# Patient Record
Sex: MALE | Race: WHITE | NOT HISPANIC OR LATINO | Employment: OTHER | URBAN - METROPOLITAN AREA
[De-identification: names, ages, dates, MRNs, and addresses within clinical notes are randomized per-mention and may not be internally consistent; named-entity substitution may affect disease eponyms.]

---

## 2020-06-23 ENCOUNTER — APPOINTMENT (OUTPATIENT)
Dept: RADIOLOGY | Facility: MEDICAL CENTER | Age: 60
End: 2020-06-23
Payer: COMMERCIAL

## 2020-06-23 VITALS — SYSTOLIC BLOOD PRESSURE: 146 MMHG | DIASTOLIC BLOOD PRESSURE: 99 MMHG | HEART RATE: 112 BPM

## 2020-06-23 DIAGNOSIS — S92.352A CLOSED DISPLACED FRACTURE OF FIFTH METATARSAL BONE OF LEFT FOOT, INITIAL ENCOUNTER: ICD-10-CM

## 2020-06-23 DIAGNOSIS — S92.352A CLOSED DISPLACED FRACTURE OF FIFTH METATARSAL BONE OF LEFT FOOT, INITIAL ENCOUNTER: Primary | ICD-10-CM

## 2020-06-23 PROCEDURE — 73630 X-RAY EXAM OF FOOT: CPT

## 2020-06-23 PROCEDURE — 99203 OFFICE O/P NEW LOW 30 MIN: CPT | Performed by: EMERGENCY MEDICINE

## 2020-07-07 ENCOUNTER — OFFICE VISIT (OUTPATIENT)
Dept: OBGYN CLINIC | Facility: MEDICAL CENTER | Age: 60
End: 2020-07-07
Payer: COMMERCIAL

## 2020-07-07 ENCOUNTER — APPOINTMENT (OUTPATIENT)
Dept: RADIOLOGY | Facility: MEDICAL CENTER | Age: 60
End: 2020-07-07
Payer: COMMERCIAL

## 2020-07-07 VITALS — WEIGHT: 181.6 LBS | BODY MASS INDEX: 26 KG/M2 | HEIGHT: 70 IN

## 2020-07-07 DIAGNOSIS — S92.352D CLOSED DISPLACED FRACTURE OF FIFTH METATARSAL BONE OF LEFT FOOT WITH ROUTINE HEALING, SUBSEQUENT ENCOUNTER: ICD-10-CM

## 2020-07-07 DIAGNOSIS — S92.352D CLOSED DISPLACED FRACTURE OF FIFTH METATARSAL BONE OF LEFT FOOT WITH ROUTINE HEALING, SUBSEQUENT ENCOUNTER: Primary | ICD-10-CM

## 2020-07-07 PROCEDURE — 73630 X-RAY EXAM OF FOOT: CPT

## 2020-07-07 PROCEDURE — 99213 OFFICE O/P EST LOW 20 MIN: CPT | Performed by: EMERGENCY MEDICINE

## 2020-07-07 NOTE — PROGRESS NOTES
Assessment/Plan:    Diagnoses and all orders for this visit:    Closed displaced fracture of fifth metatarsal bone of left foot with routine healing, subsequent encounter  -     XR foot 3+ vw left; Future    Patient improving stable Xrays obtained today, presents today in shoe with mild limp, hoping to return to work in 2 weeks possibly ( on ladders)  Discussed weaning from boot as tolerated  Return in about 4 weeks (around 8/4/2020) for Repeat Xray foot  Chief Complaint:     Chief Complaint   Patient presents with    Left Foot - Follow-up       Subjective:   Patient ID: Eyad Bassett is a 61 y o  male  Patient returns three weeks out from injury of the left foot fracture repeat x-rays obtained today he has been in a Cam boot  Review of Systems   Constitutional: Negative for chills and fever  HENT: Negative for sore throat and trouble swallowing  Eyes: Negative for pain and discharge  Respiratory: Negative for choking and shortness of breath  Cardiovascular: Negative for chest pain and palpitations  Gastrointestinal: Negative for abdominal pain and vomiting  Endocrine: Negative for cold intolerance and heat intolerance  Musculoskeletal: Positive for arthralgias, gait problem and joint swelling  Neurological: Negative for dizziness and weakness  Psychiatric/Behavioral: Negative for self-injury and suicidal ideas  The following portions of the patient's chart were reviewed and updated as appropriate: Allergy:  No Known Allergies      Past Medical History:   Diagnosis Date    [facility]        History reviewed  No pertinent surgical history      Social History     Socioeconomic History    Marital status: /Civil Union     Spouse name: Not on file    Number of children: Not on file    Years of education: Not on file    Highest education level: Not on file   Occupational History    Not on file   Social Needs    Financial resource strain: Not on file    Food insecurity:     Worry: Not on file     Inability: Not on file    Transportation needs:     Medical: Not on file     Non-medical: Not on file   Tobacco Use    Smoking status: Current Some Day Smoker     Types: Cigars    Smokeless tobacco: Never Used    Tobacco comment: Occasionally   Substance and Sexual Activity    Alcohol use: Yes    Drug use: Not Currently    Sexual activity: Not on file   Lifestyle    Physical activity:     Days per week: Not on file     Minutes per session: Not on file    Stress: Not on file   Relationships    Social connections:     Talks on phone: Not on file     Gets together: Not on file     Attends [employer] service: Not on file     Active member of club or organization: Not on file     Attends meetings of clubs or organizations: Not on file     Relationship status: Not on file    Intimate partner violence:     Fear of current or ex partner: Not on file     Emotionally abused: Not on file     Physically abused: Not on file     Forced sexual activity: Not on file   Other Topics Concern    Not on file   Social History Narrative    Not on file       Family History   Problem Relation Age of Onset    Diabetes Mother     Dementia Father     Prostate cancer Father     Hypertension Father     Hyperlipidemia Father        Medications:  No current outpatient medications on file  There is no problem list on file for this patient  Objective:  Ht 5' 9 5" (1 765 m)   Wt 82 4 kg (181 lb 9 6 oz)   BMI 26 43 kg/m²     Ortho Exam    Physical Exam   Constitutional: He is oriented to person, place, and time  He appears well-developed and well-nourished  HENT:   Head: Normocephalic and atraumatic  Eyes: Conjunctivae are normal    Neck: Neck supple  Pulmonary/Chest: Effort normal    Neurological: He is alert and oriented to person, place, and time  Skin: Skin is warm and dry  Psychiatric: He has a normal mood and affect   His behavior is normal  Vitals reviewed  Neurologic Exam     Mental Status   Oriented to person, place, and time  Procedures    I have personally reviewed pertinent films in PACS

## 2022-06-04 LAB
LEFT EYE DIABETIC RETINOPATHY: NORMAL
RIGHT EYE DIABETIC RETINOPATHY: NORMAL
SEVERITY (EYE EXAM): NORMAL

## 2022-10-05 ENCOUNTER — OFFICE VISIT (OUTPATIENT)
Dept: FAMILY MEDICINE CLINIC | Facility: CLINIC | Age: 62
End: 2022-10-05
Payer: COMMERCIAL

## 2022-10-05 VITALS
HEART RATE: 99 BPM | TEMPERATURE: 97.1 F | DIASTOLIC BLOOD PRESSURE: 82 MMHG | SYSTOLIC BLOOD PRESSURE: 132 MMHG | OXYGEN SATURATION: 98 % | BODY MASS INDEX: 25.28 KG/M2 | HEIGHT: 70 IN | WEIGHT: 176.6 LBS

## 2022-10-05 DIAGNOSIS — Z76.89 ESTABLISHING CARE WITH NEW DOCTOR, ENCOUNTER FOR: ICD-10-CM

## 2022-10-05 DIAGNOSIS — Z13.21 ENCOUNTER FOR VITAMIN DEFICIENCY SCREENING: ICD-10-CM

## 2022-10-05 DIAGNOSIS — Z12.5 SCREENING FOR PROSTATE CANCER: ICD-10-CM

## 2022-10-05 DIAGNOSIS — E11.9 TYPE 2 DIABETES MELLITUS WITHOUT COMPLICATION, WITHOUT LONG-TERM CURRENT USE OF INSULIN (HCC): ICD-10-CM

## 2022-10-05 DIAGNOSIS — Z12.11 SCREENING FOR COLORECTAL CANCER: ICD-10-CM

## 2022-10-05 DIAGNOSIS — Z12.12 SCREENING FOR COLORECTAL CANCER: ICD-10-CM

## 2022-10-05 DIAGNOSIS — M54.42 ACUTE LEFT-SIDED LOW BACK PAIN WITH LEFT-SIDED SCIATICA: Primary | ICD-10-CM

## 2022-10-05 PROCEDURE — 99203 OFFICE O/P NEW LOW 30 MIN: CPT | Performed by: NURSE PRACTITIONER

## 2022-10-05 RX ORDER — METHOCARBAMOL 500 MG/1
500 TABLET, FILM COATED ORAL 3 TIMES DAILY
Qty: 60 TABLET | Refills: 0 | Status: SHIPPED | OUTPATIENT
Start: 2022-10-05

## 2022-10-05 RX ORDER — IBUPROFEN 200 MG
800 TABLET ORAL EVERY 6 HOURS PRN
COMMUNITY

## 2022-10-05 RX ORDER — NAPROXEN 500 MG/1
500 TABLET ORAL 2 TIMES DAILY WITH MEALS
Qty: 30 TABLET | Refills: 0 | Status: SHIPPED | OUTPATIENT
Start: 2022-10-05 | End: 2022-10-19 | Stop reason: ALTCHOICE

## 2022-10-05 NOTE — ASSESSMENT & PLAN NOTE
Diagnosed in 2015, states he was initiated on Metformin, but stopped medication due to side effects  Began to diet and exercise and last check of HGBA1C back in 2016 was "under 7" as per patient  Has not had follow-up since then  Will obtain labs      No results found for: HGBA1C

## 2022-10-05 NOTE — PROGRESS NOTES
Name: Ann Marie Blanco      : 1960      MRN: [id number]  Encounter Provider: YESENIA Cartagena  Encounter Date: 10/5/2022   Encounter department: [place] [id number]  Acute left-sided low back pain with left-sided sciatica  Comments:  Advised alternating ice therapy, then heat, ROM exercises as discussed  To follow-up with chiropractor as scheduled, return for worsening/concerning symptoms  Orders:  -     naproxen (Naprosyn) 500 mg tablet; Take 1 tablet (500 mg total) by mouth 2 (two) times a day with meals  -     methocarbamol (ROBAXIN) 500 mg tablet; Take 1 tablet (500 mg total) by mouth 3 (three) times a day    2  Type 2 diabetes mellitus without complication, without long-term current use of insulin ([facility] )  Assessment & Plan:  Diagnosed in , states he was initiated on Metformin, but stopped medication due to side effects  Began to diet and exercise and last check of HGBA1C back in 2016 was "under 7" as per patient  Has not had follow-up since then  Will obtain labs  No results found for: HGBA1C    Orders:  -     Lipid panel; Future  -     CBC and Platelet; Future  -     Comprehensive metabolic panel; Future  -     Microalbumin / creatinine urine ratio; Future  -     TSH, 3rd generation with Free T4 reflex; Future  -     Hemoglobin A1C; Future    3  Encounter for vitamin deficiency screening    4  Screening for prostate cancer  -     PSA, Total Screen; Future    5  Screening for colorectal cancer  -     Ambulatory referral for Colonoscopy; Future    6  Establishing care with new doctor, encounter for    BMI Counseling: Body mass index is 25 34 kg/m²   The BMI is above normal  Nutrition recommendations include decreasing portion sizes, encouraging healthy choices of fruits and vegetables, consuming healthier snacks, limiting drinks that contain sugar, moderation in carbohydrate intake, increasing intake of lean protein, reducing intake of saturated and trans fat and reducing intake of cholesterol  Exercise recommendations include exercising 3-5 times per week and strength training exercises  No pharmacotherapy was ordered  Rationale for BMI follow-up plan is due to patient being overweight or obese  Depression Screening and Follow-up Plan: Patient was screened for depression during today's encounter  They screened negative with a PHQ-2 score of 0  Subjective      Patient presents to the office for establishment of care  States he has not seen primary provider in 5 years  As per patient, diagnosed with Type 2 diabetes back in 2015/2016  Patient states he was started on Metformin, but experienced side effects, so stopped taking medication  Patient states he began to diet and exercised and was able to achieve a HGB A1C of less than 7, but that was back in 2016  Notes he has not been followed since then  Is not in the gym like he used to be  Patient does not monitor blood sugars at home, denies symptoms related to elevated blood sugars  Patient arrives with c/o lower sided left-sided back pain that began a week ago  Patient notes pain started after working  Saw chiropractor 5 days ago, felt better, but then pain worsen yesterday after working  Was unable to sleep due to pain  Notes pain as a "constant pain" 8-9/10  Pain is worse with sitting or laying  Pain starts in his left lower back, radiates to hips, then goes down his left leg  Patient denies saddle paresthesia, denies incontinence of bladder or bowel  Denies inability to walk or inability to bear weight on legs  Patient has been taking ibuprofen at home without relief  Review of Systems   Constitutional: Negative for chills, fever and unexpected weight change  HENT: Negative for sore throat and trouble swallowing  Eyes: Negative for photophobia and visual disturbance  Respiratory: Negative for cough and shortness of breath      Cardiovascular: Negative for chest pain and palpitations  Gastrointestinal: Negative for abdominal pain, nausea and vomiting  Genitourinary: Negative for decreased urine volume, dysuria, flank pain, frequency, hematuria and urgency  Musculoskeletal: Positive for back pain (left lower back)  Negative for gait problem and myalgias  Skin: Negative for color change and rash  Neurological: Positive for numbness (experiences "tingling" to back of left leg)  Negative for dizziness, weakness, light-headedness and headaches  Psychiatric/Behavioral: Negative for confusion  Current Outpatient Medications on File Prior to Visit   Medication Sig    ibuprofen (MOTRIN) 200 mg tablet Take 800 mg by mouth every 6 (six) hours as needed for mild pain       Objective     /82 (BP Location: Left arm, Patient Position: Sitting)   Pulse 99   Temp (!) 97 1 °F (36 2 °C)   Ht 5' 10" (1 778 m)   Wt 80 1 kg (176 lb 9 6 oz)   SpO2 98%   BMI 25 34 kg/m²     Physical Exam  Vitals reviewed  Constitutional:       General: He is not in acute distress  Appearance: Normal appearance  He is not ill-appearing  HENT:      Head: Normocephalic and atraumatic  Right Ear: External ear normal       Left Ear: External ear normal       Nose: Nose normal       Mouth/Throat:      Mouth: Mucous membranes are moist       Pharynx: Oropharynx is clear  Eyes:      Conjunctiva/sclera: Conjunctivae normal       Pupils: Pupils are equal, round, and reactive to light  Cardiovascular:      Rate and Rhythm: Normal rate and regular rhythm  Pulses: Normal pulses  Heart sounds: Normal heart sounds  Pulmonary:      Effort: Pulmonary effort is normal       Breath sounds: Normal breath sounds  Abdominal:      General: Bowel sounds are normal       Palpations: Abdomen is soft  Tenderness: There is no abdominal tenderness  There is no right CVA tenderness or left CVA tenderness     Musculoskeletal:      Cervical back: Normal, normal range of motion and neck supple  No bony tenderness  Thoracic back: Normal  No bony tenderness  Lumbar back: No tenderness or bony tenderness  Normal range of motion  Negative right straight leg raise test and negative left straight leg raise test         Back:       Right lower leg: No edema  Left lower leg: No edema  Skin:     General: Skin is warm and dry  Capillary Refill: Capillary refill takes less than 2 seconds  Neurological:      General: No focal deficit present  Mental Status: He is alert and oriented to person, place, and time     Psychiatric:         Mood and Affect: Mood normal          Behavior: Behavior normal        YESENIA Chandler

## 2022-10-05 NOTE — PATIENT INSTRUCTIONS
Lower Back Exercises   AMBULATORY CARE:   Lower back exercises  help heal and strengthen your back muscles to prevent another injury  Ask your healthcare provider if you need to see a physical therapist for more advanced exercises  Seek care immediately if:   You have severe pain that prevents you from moving  Contact your healthcare provider if:   Your pain becomes worse  You have new pain  You have questions or concerns about your condition or care  Do lower back exercises safely:   Do the exercises on a mat or firm surface  (not on a bed) to support your spine and prevent low back pain  Move slowly and smoothly  Avoid fast or jerky motions  Breathe normally  Do not hold your breath  Stop if you feel pain  It is normal to feel some discomfort at first  Regular exercise will help decrease your discomfort over time  Lower back exercises: Your healthcare provider may recommend that you do back exercises 10 to 30 minutes each day  He may also recommend that you do exercises 1 to 3 times each day  Ask your healthcare provider which exercises are best for you and how often to do them  Ankle pumps:  Lie on your back  Move your foot up (with your toes pointing toward your head)  Then, move your foot down (with your toes pointing away from you)  Repeat this exercise 10 times on each side  Heel slides:  Lie on your back  Slowly bend one leg and then straighten it  Next, bend the other leg and then straighten it  Repeat 10 times on each side  Pelvic tilt:  Lie on your back with your knees bent and feet flat on the floor  Place your arms in a relaxed position beside your body  Tighten the muscles of your abdomen and flatten your back against the floor  Hold for 5 seconds  Repeat 5 times  Back stretch:  Lie on your back with your hands behind your head  Bend your knees and turn the lower half of your body to one side  Hold this position for 10 seconds   Repeat 3 times on each side  Straight leg raises:  Lie on your back with one leg straight  Bend the other knee  Tighten your abdomen and then slowly lift the straight leg up about 6 to 12 inches off the floor  Hold for 1 to 5 seconds  Lower your leg slowly  Repeat 10 times on each leg  Knee-to-chest:  Lie on your back with your knees bent and feet flat on the floor  Pull one of your knees toward your chest and hold it there for 5 seconds  Return your leg to the starting position  Lift the other knee toward your chest and hold for 5 seconds  Do this 5 times on each side  Cat and camel:  Place your hands and knees on the floor  Arch your back upward toward the ceiling and lower your head  Round out your spine as much as you can  Hold for 5 seconds  Lift your head upward and push your chest downward toward the floor  Hold for 5 seconds  Do 3 sets or as directed  Wall squats:  Stand with your back against a wall  Tighten the muscles of your abdomen  Slowly lower your body until your knees are bent at a 45 degree angle  Hold this position for 5 seconds  Slowly move back up to a standing position  Repeat 10 times  Curl up:  Lie on your back with your knees bent and feet flat on the floor  Place your hands, palms down, underneath the curve in your lower back  Next, with your elbows on the floor, lift your shoulders and chest 2 to 3 inches  Keep your head in line with your shoulders  Hold this position for 5 seconds  When you can do this exercise without pain for 10 to 15 seconds, you may add a rotation  While your shoulders and chest are lifted off the ground, turn slightly to the left and hold  Repeat on the other side  Bird dog:  Place your hands and knees on the floor  Keep your wrists directly below your shoulders and your knees directly below your hips  Pull your belly button in toward your spine  Do not flatten or arch your back  Tighten your abdominal muscles   Raise one arm straight out so that it is aligned with your head  Next, raise the leg opposite your arm  Hold this position for 15 seconds  Lower your arm and leg slowly and change sides  Do 5 sets  © Copyright [employer] 2022 Information is for End User's use only and may not be sold, redistributed or otherwise used for commercial purposes  All illustrations and images included in CareNotes® are the copyrighted property of A D [employer] M [employer]  or [facility] [employer]idra Boothe   The above information is an  only  It is not intended as medical advice for individual conditions or treatments  Talk to your doctor, nurse or pharmacist before following any medical regimen to see if it is safe and effective for you

## 2022-10-07 ENCOUNTER — TELEPHONE (OUTPATIENT)
Dept: FAMILY MEDICINE CLINIC | Facility: CLINIC | Age: 62
End: 2022-10-07

## 2022-10-07 DIAGNOSIS — M54.42 ACUTE LEFT-SIDED LOW BACK PAIN WITH LEFT-SIDED SCIATICA: Primary | ICD-10-CM

## 2022-10-07 RX ORDER — METHYLPREDNISOLONE 4 MG/1
TABLET ORAL
Qty: 21 EACH | Refills: 0 | Status: SHIPPED | OUTPATIENT
Start: 2022-10-07 | End: 2022-10-19 | Stop reason: ALTCHOICE

## 2022-10-07 NOTE — TELEPHONE ENCOUNTER
The pain is worse at night  The medication is hot working  He is up walking around all night, not sleeping  He took 2 pills this morning and they have not helped     Please advise

## 2022-10-07 NOTE — TELEPHONE ENCOUNTER
Stop Naproxen and to start Medrol Dose Pack, just sent it in to pharmacy  Please inform him Medrol Dose pack will not take effect immediately, so will still need to take Robaxin    Thank you

## 2022-10-10 ENCOUNTER — APPOINTMENT (OUTPATIENT)
Dept: LAB | Facility: HOSPITAL | Age: 62
End: 2022-10-10
Payer: COMMERCIAL

## 2022-10-10 DIAGNOSIS — E11.9 TYPE 2 DIABETES MELLITUS WITHOUT COMPLICATION, WITHOUT LONG-TERM CURRENT USE OF INSULIN (HCC): ICD-10-CM

## 2022-10-10 DIAGNOSIS — Z12.5 SCREENING FOR PROSTATE CANCER: ICD-10-CM

## 2022-10-10 LAB
ALBUMIN SERPL BCP-MCNC: 3.8 G/DL (ref 3.5–5)
ALP SERPL-CCNC: 128 U/L (ref 46–116)
ALT SERPL W P-5'-P-CCNC: 35 U/L (ref 12–78)
ANION GAP SERPL CALCULATED.3IONS-SCNC: 9 MMOL/L (ref 4–13)
AST SERPL W P-5'-P-CCNC: 21 U/L (ref 5–45)
BILIRUB SERPL-MCNC: 0.65 MG/DL (ref 0.2–1)
BUN SERPL-MCNC: 23 MG/DL (ref 5–25)
CALCIUM SERPL-MCNC: 9.6 MG/DL (ref 8.3–10.1)
CHLORIDE SERPL-SCNC: 99 MMOL/L (ref 96–108)
CHOLEST SERPL-MCNC: 235 MG/DL
CO2 SERPL-SCNC: 28 MMOL/L (ref 21–32)
CREAT SERPL-MCNC: 1.28 MG/DL (ref 0.6–1.3)
CREAT UR-MCNC: 97.4 MG/DL
ERYTHROCYTE [DISTWIDTH] IN BLOOD BY AUTOMATED COUNT: 12.7 % (ref 11.6–15.1)
EST. AVERAGE GLUCOSE BLD GHB EST-MCNC: 272 MG/DL
GFR SERPL CREATININE-BSD FRML MDRD: 59 ML/MIN/1.73SQ M
GLUCOSE P FAST SERPL-MCNC: 349 MG/DL (ref 65–99)
HBA1C MFR BLD: 11.1 %
HCT VFR BLD AUTO: 44.9 % (ref 36.5–49.3)
HDLC SERPL-MCNC: 91 MG/DL
HGB BLD-MCNC: 14.9 G/DL (ref 12–17)
LDLC SERPL CALC-MCNC: 131 MG/DL (ref 0–100)
MCH RBC QN AUTO: 30.3 PG (ref 26.8–34.3)
MCHC RBC AUTO-ENTMCNC: 33.2 G/DL (ref 31.4–37.4)
MCV RBC AUTO: 91 FL (ref 82–98)
MICROALBUMIN UR-MCNC: 65.8 MG/L (ref 0–20)
MICROALBUMIN/CREAT 24H UR: 68 MG/G CREATININE (ref 0–30)
NONHDLC SERPL-MCNC: 144 MG/DL
PLATELET # BLD AUTO: 226 THOUSANDS/UL (ref 149–390)
PMV BLD AUTO: 12.1 FL (ref 8.9–12.7)
POTASSIUM SERPL-SCNC: 4.4 MMOL/L (ref 3.5–5.3)
PROT SERPL-MCNC: 7.4 G/DL (ref 6.4–8.4)
PSA SERPL-MCNC: 2.1 NG/ML (ref 0–4)
RBC # BLD AUTO: 4.91 MILLION/UL (ref 3.88–5.62)
SODIUM SERPL-SCNC: 136 MMOL/L (ref 135–147)
TRIGL SERPL-MCNC: 65 MG/DL
TSH SERPL DL<=0.05 MIU/L-ACNC: 0.67 UIU/ML (ref 0.45–4.5)
WBC # BLD AUTO: 10.95 THOUSAND/UL (ref 4.31–10.16)

## 2022-10-10 PROCEDURE — 82570 ASSAY OF URINE CREATININE: CPT

## 2022-10-10 PROCEDURE — G0103 PSA SCREENING: HCPCS

## 2022-10-10 PROCEDURE — 80053 COMPREHEN METABOLIC PANEL: CPT

## 2022-10-10 PROCEDURE — 82043 UR ALBUMIN QUANTITATIVE: CPT

## 2022-10-10 PROCEDURE — 36415 COLL VENOUS BLD VENIPUNCTURE: CPT

## 2022-10-10 PROCEDURE — 85027 COMPLETE CBC AUTOMATED: CPT

## 2022-10-10 PROCEDURE — 80061 LIPID PANEL: CPT

## 2022-10-10 PROCEDURE — 83036 HEMOGLOBIN GLYCOSYLATED A1C: CPT

## 2022-10-10 PROCEDURE — 84443 ASSAY THYROID STIM HORMONE: CPT

## 2022-10-14 ENCOUNTER — TELEPHONE (OUTPATIENT)
Dept: FAMILY MEDICINE CLINIC | Facility: CLINIC | Age: 62
End: 2022-10-14

## 2022-10-14 NOTE — TELEPHONE ENCOUNTER
Jose Purdy called and said that the pain medication not working so he is just taking advil but a lot last night he took 3 advil at 10pm then at 4am took 2 did nothing then at 6am he took another 2 he doesn't sleep good its worse at night  He goes to chiro on fridays feels good til Tuesday then he back in pain again  Would like to know what he should do he can't live with pain and continue to take all that advil  Please advise  Thank you

## 2022-10-19 ENCOUNTER — OFFICE VISIT (OUTPATIENT)
Dept: FAMILY MEDICINE CLINIC | Facility: CLINIC | Age: 62
End: 2022-10-19
Payer: COMMERCIAL

## 2022-10-19 VITALS
DIASTOLIC BLOOD PRESSURE: 98 MMHG | BODY MASS INDEX: 25.17 KG/M2 | WEIGHT: 175.8 LBS | OXYGEN SATURATION: 98 % | SYSTOLIC BLOOD PRESSURE: 146 MMHG | TEMPERATURE: 97.1 F | HEIGHT: 70 IN | HEART RATE: 99 BPM

## 2022-10-19 DIAGNOSIS — Z00.00 ANNUAL PHYSICAL EXAM: ICD-10-CM

## 2022-10-19 DIAGNOSIS — E78.2 MIXED HYPERLIPIDEMIA: ICD-10-CM

## 2022-10-19 DIAGNOSIS — E11.9 TYPE 2 DIABETES MELLITUS WITHOUT COMPLICATION, WITHOUT LONG-TERM CURRENT USE OF INSULIN (HCC): Primary | ICD-10-CM

## 2022-10-19 PROCEDURE — 99396 PREV VISIT EST AGE 40-64: CPT | Performed by: NURSE PRACTITIONER

## 2022-10-19 RX ORDER — GLIMEPIRIDE 2 MG/1
2 TABLET ORAL
Qty: 90 TABLET | Refills: 0 | Status: SHIPPED | OUTPATIENT
Start: 2022-10-19 | End: 2023-04-17

## 2022-10-19 RX ORDER — BLOOD SUGAR DIAGNOSTIC
STRIP MISCELLANEOUS
Qty: 100 EACH | Refills: 3 | Status: SHIPPED | OUTPATIENT
Start: 2022-10-19

## 2022-10-19 RX ORDER — LANCETS 33 GAUGE
EACH MISCELLANEOUS
Qty: 100 EACH | Refills: 3 | Status: SHIPPED | OUTPATIENT
Start: 2022-10-19

## 2022-10-19 RX ORDER — BLOOD-GLUCOSE METER
KIT MISCELLANEOUS
Qty: 1 KIT | Refills: 0 | Status: SHIPPED | OUTPATIENT
Start: 2022-10-19

## 2022-10-19 RX ORDER — ATORVASTATIN CALCIUM 20 MG/1
20 TABLET, FILM COATED ORAL EVERY EVENING
Qty: 90 TABLET | Refills: 1 | Status: SHIPPED | OUTPATIENT
Start: 2022-10-19 | End: 2023-04-17

## 2022-10-19 NOTE — PROGRESS NOTES
Temo Munroe [id number] [facility]    NAME: Rich Arcos  AGE: 58 y o  SEX: male  : 1960     DATE: 10/19/2022     Assessment and Plan:     Problem List Items Addressed This Visit        Endocrine    Type 2 diabetes mellitus without complication, without long-term current use of insulin ([facility] [address] ) - Primary     Recent blood work reviewed with patient  Due to GI intolerance to metformin previously, will initiate glimepiride 2 mg  Discussed with patient adherence to lifestyle modifications including diabetic diet, increasing physical activity, maintaining healthy weight  At this time patient is refusing diabetic education  Foot exam completed, patient had eye exam back in ,  paperwork completed  Will re-evaluate in 3 months with repeat blood work  Lab Results   Component Value Date    HGBA1C 11 1 (H) 10/10/2022            Relevant Medications    glimepiride (AMARYL) 2 mg tablet    atorvastatin (LIPITOR) 20 mg tablet    Blood Glucose Monitoring Suppl (OneTouch Verio Reflect) w/Device KIT    glucose blood (OneTouch Verio) test strip    OneTouch Delica Lancets 86F MISC    Other Relevant Orders    Hemoglobin A1C    Comprehensive metabolic panel    CBC and Platelet       Other    Mixed hyperlipidemia     Recent blood work reviewed  Patient initiated on statin  Discussed lifestyle modifications including low-fat, high-fiber diet, weight management, and increasing physical activity  Relevant Medications    atorvastatin (LIPITOR) 20 mg tablet      Other Visit Diagnoses     Annual physical exam              Immunizations and preventive care screenings were discussed with patient today  Appropriate education was printed on patient's after visit summary  Discussed risks and benefits of prostate cancer screening   We discussed the controversial history of PSA screening for prostate cancer in the [place] as well as the risk of over detection and over treatment of prostate cancer by way of PSA screening  The patient understands that PSA blood testing is an imperfect way to screen for prostate cancer and that elevated PSA levels in the blood may also be caused by infection, inflammation, prostatic trauma or manipulation, urological procedures, or by benign prostatic enlargement  The role of the digital rectal examination in prostate cancer screening was also discussed and I discussed with him that there is large interobserver variability in the findings of digital rectal examination  Counseling:  Alcohol/drug use: discussed moderation in alcohol intake, the recommendations for healthy alcohol use, and avoidance of illicit drug use  Dental Health: discussed importance of regular tooth brushing, flossing, and dental visits  Injury prevention: discussed safety/seat belts, safety helmets, smoke detectors, carbon dioxide detectors, and smoking near bedding or upholstery  Sexual health: discussed sexually transmitted diseases, partner selection, use of condoms, avoidance of unintended pregnancy, and contraceptive alternatives  · Exercise: the importance of regular exercise/physical activity was discussed  Recommend exercise 3-5 times per week for at least 30 minutes  Tobacco Cessation Counseling: Tobacco cessation counseling was provided  The patient is sincerely urged to quit consumption of tobacco  He is not ready to quit tobacco  Medication options and side effects of medication discussed  Patient refused medication  Return in about 3 months (around 1/19/2023) for Needs 30 minute visit  Chief Complaint:     Chief Complaint   Patient presents with   • Physical Exam     Diabetic foot exam due  History of Present Illness:     Adult Annual Physical   Patient here for a comprehensive physical exam  The patient reports no problems  Diet and Physical Activity  · Diet/Nutrition: poor diet  · Exercise: no formal exercise  Depression Screening  PHQ-2/9 Depression Screening         General Health  · Sleep: gets 7-8 hours of sleep on average  · Hearing: normal - bilateral   · Vision: most recent eye exam <1 year ago and wears glasses  · Dental: regular dental visits and brushes teeth twice daily   Health  · Symptoms include: none     Review of Systems:     Review of Systems   Constitutional: Negative for activity change, appetite change, fatigue and unexpected weight change  HENT: Negative for ear pain, sore throat and trouble swallowing  Eyes: Negative for photophobia and visual disturbance  Respiratory: Negative for cough, chest tightness and shortness of breath  Cardiovascular: Negative for chest pain and palpitations  Gastrointestinal: Negative for abdominal pain, constipation, diarrhea, nausea and vomiting  Endocrine: Negative for polydipsia, polyphagia and polyuria  Genitourinary: Negative for decreased urine volume, dysuria, flank pain, frequency, hematuria and urgency  Musculoskeletal: Negative for arthralgias and myalgias  Skin: Negative for color change and rash  Neurological: Negative for dizziness, syncope, weakness, light-headedness, numbness and headaches  Psychiatric/Behavioral: Negative for confusion and dysphoric mood  The patient is not nervous/anxious  Past Medical History:     Past Medical History:   Diagnosis Date   • Diabetes mellitus ([facility] )    • Type 2 diabetes mellitus ([facility] )       Past Surgical History:     History reviewed  No pertinent surgical history     Family History:     Family History   Problem Relation Age of Onset   • Diabetes Mother    • Alzheimer's disease Mother    • Dementia Father    • Prostate cancer Father    • Hypertension Father    • Hyperlipidemia Father       Social History:     Social History     Socioeconomic History   • Marital status: /Civil Union     Spouse name: None   • Number of children: None   • Years of education: None   • Highest education level: None   Occupational History   • None   Tobacco Use   • Smoking status: Current Some Day Smoker     Years: 10 00     Types: Cigars   • Smokeless tobacco: Never Used   • Tobacco comment: Occasionally   Vaping Use   • Vaping Use: Never used   Substance and Sexual Activity   • Alcohol use: Yes   • Drug use: Not Currently   • Sexual activity: None   Other Topics Concern   • None   Social History Narrative   • None     Social Determinants of Health     Financial Resource Strain: Not on file   Food Insecurity: Not on file   Transportation Needs: Not on file   Physical Activity: Not on file   Stress: Not on file   Social Connections: Not on file   Intimate Partner Violence: Not on file   Housing Stability: Not on file      Current Medications:     Current Outpatient Medications   Medication Sig Dispense Refill   • atorvastatin (LIPITOR) 20 mg tablet Take 1 tablet (20 mg total) by mouth every evening 90 tablet 1   • Blood Glucose Monitoring Suppl (OneTouch Verio Reflect) w/Device KIT Check blood sugars once daily  Please substitute with appropriate alternative as covered by patient's insurance  Dx: E11 65 1 kit 0   • glimepiride (AMARYL) 2 mg tablet Take 1 tablet (2 mg total) by mouth daily with breakfast 90 tablet 0   • glucose blood (OneTouch Verio) test strip Check blood sugars once daily  Please substitute with appropriate alternative as covered by patient's insurance  Dx: E11 65 100 each 3   • ibuprofen (MOTRIN) 200 mg tablet Take 800 mg by mouth every 6 (six) hours as needed for mild pain     • methocarbamol (ROBAXIN) 500 mg tablet Take 1 tablet (500 mg total) by mouth 3 (three) times a day 60 tablet 0   • OneTouch Delica Lancets 09U MISC Check blood sugars once daily  Please substitute with appropriate alternative as covered by patient's insurance  Dx: E11 65 100 each 3     No current facility-administered medications for this visit        Allergies:     No Known Allergies   Physical Exam:     /98 (BP Location: Left arm, Patient Position: Sitting, Cuff Size: Standard)   Pulse 99   Temp (!) 97 1 °F (36 2 °C) (Tympanic)   Ht 5' 10" (1 778 m)   Wt 79 7 kg (175 lb 12 8 oz)   SpO2 98%   BMI 25 22 kg/m²     Physical Exam  Vitals reviewed  Constitutional:       General: He is not in acute distress  Appearance: Normal appearance  He is well-developed  He is not ill-appearing  HENT:      Head: Normocephalic and atraumatic  Right Ear: Tympanic membrane, ear canal and external ear normal       Left Ear: Tympanic membrane, ear canal and external ear normal       Nose: Nose normal       Mouth/Throat:      Mouth: Mucous membranes are moist       Pharynx: Oropharynx is clear  Eyes:      Conjunctiva/sclera: Conjunctivae normal       Pupils: Pupils are equal, round, and reactive to light  Neck:      Vascular: No carotid bruit  Cardiovascular:      Rate and Rhythm: Normal rate and regular rhythm  Pulses: Normal pulses  no weak pulses          Dorsalis pedis pulses are 2+ on the right side and 2+ on the left side  Heart sounds: Normal heart sounds  No murmur heard  Pulmonary:      Effort: Pulmonary effort is normal  No respiratory distress  Breath sounds: Normal breath sounds  Abdominal:      General: Bowel sounds are normal       Palpations: Abdomen is soft  Tenderness: There is no abdominal tenderness  There is no right CVA tenderness or left CVA tenderness  Musculoskeletal:         General: Normal range of motion  Cervical back: Normal range of motion and neck supple  Right lower leg: No edema  Left lower leg: No edema  Feet:      Right foot:      Skin integrity: No ulcer, skin breakdown, erythema, warmth, callus or dry skin  Left foot:      Skin integrity: No ulcer, skin breakdown, erythema, warmth, callus or dry skin  Skin:     General: Skin is warm and dry        Capillary Refill: Capillary refill takes less than 2 seconds  Neurological:      General: No focal deficit present  Mental Status: He is alert and oriented to person, place, and time  Psychiatric:         Mood and Affect: Mood normal          Behavior: Behavior normal         Patient's shoes and socks removed  Right Foot/Ankle   Right Foot Inspection  Skin Exam: skin normal and skin intact  No dry skin, no warmth, no callus, no erythema, no maceration, no abnormal color, no pre-ulcer, no ulcer and no callus  Toe Exam: ROM and strength within normal limits  Sensory   Monofilament testing: intact    Vascular  Capillary refills: < 3 seconds  The right DP pulse is 2+  Right Toe  - Comprehensive Exam  Ecchymosis: none  Arch: normal  Claw Toes: absent  Swelling: none   Tenderness: none         Left Foot/Ankle  Left Foot Inspection  Skin Exam: skin normal and skin intact  No dry skin, no warmth, no erythema, no maceration, normal color, no pre-ulcer, no ulcer and no callus  Toe Exam: ROM and strength within normal limits  Sensory   Monofilament testing: intact    Vascular  Capillary refills: < 3 seconds  The left DP pulse is 2+       Left Toe  - Comprehensive Exam  Ecchymosis: none  Arch: normal  Hammertoes: absent  Claw toes: absent  Swelling: none   Tenderness: none           Assign Risk Category  No deformity present  No loss of protective sensation  No weak pulses  Risk: 0      Elo L [place], [address]

## 2022-10-19 NOTE — PATIENT INSTRUCTIONS
Wellness Visit for Adults   AMBULATORY CARE:   A wellness visit  is when you see your healthcare provider to get screened for health problems  Your healthcare provider will also give you advice on how to stay healthy  Write down your questions so you remember to ask them  Ask your healthcare provider how often you should have a wellness visit  What happens at a wellness visit:  Your healthcare provider will ask about your health, and your family history of health problems  This includes high blood pressure, heart disease, and cancer  He or she will ask if you have symptoms that concern you, if you smoke, and about your mood  You may also be asked about your intake of medicines, supplements, food, and alcohol  Any of the following may be done: Your weight  will be checked  Your height may also be checked so your body mass index (BMI) can be calculated  Your BMI shows if you are at a healthy weight  Your blood pressure  and heart rate will be checked  Your temperature may also be checked  Blood and urine tests  may be done  Blood tests may be done to check your cholesterol levels  Abnormal cholesterol levels increase your risk for heart disease and stroke  You may also need a blood or urine test to check for diabetes if you are at increased risk  Urine tests may be done to look for signs of an infection or kidney disease  A physical exam  includes checking your heartbeat and lungs with a stethoscope  Your healthcare provider may also check your skin to look for sun damage  Screening tests  may be recommended  A screening test is done to check for diseases that may not cause symptoms  The screening tests you may need depend on your age, gender, family history, and lifestyle habits  For example, colorectal screening may be recommended if you are 48years old or older  Screening tests you need if you are a woman:   A Pap smear  is used to screen for cervical cancer   Pap smears are usually done every 3 to 5 years depending on your age  You may need them more often if you have had abnormal Pap smear test results in the past  Ask your healthcare provider how often you should have a Pap smear  A mammogram  is an x-ray of your breasts to screen for breast cancer  Experts recommend mammograms every 2 years starting at age 48 years  You may need a mammogram at age 52 years or younger if you have an increased risk for breast cancer  Talk to your healthcare provider about when you should start having mammograms and how often you need them  Vaccines you may need:   Get an influenza vaccine  every year  The influenza vaccine protects you from the flu  Several types of viruses cause the flu  The viruses change over time, so new vaccines are made each year  Get a tetanus-diphtheria (Td) booster vaccine  every 10 years  This vaccine protects you against tetanus and diphtheria  Tetanus is a severe infection that may cause painful muscle spasms and lockjaw  Diphtheria is a severe bacterial infection that causes a thick covering in the back of your mouth and throat  Get a human papillomavirus (HPV) vaccine  if you are female and aged 23 to 32 or male 23 to 24 and never received it  This vaccine protects you from HPV infection  HPV is the most common infection spread by sexual contact  HPV may also cause vaginal, penile, and anal cancers  Get a pneumococcal vaccine  if you are aged 72 years or older  The pneumococcal vaccine is an injection given to protect you from pneumococcal disease  Pneumococcal disease is an infection caused by pneumococcal bacteria  The infection may cause pneumonia, meningitis, or an ear infection  Get a shingles vaccine  if you are 60 or older, even if you have had shingles before  The shingles vaccine is an injection to protect you from the varicella-zoster virus  This is the same virus that causes chickenpox   Shingles is a painful rash that develops in people who had chickenpox or have been exposed to the virus  How to eat healthy:  My Plate is a model for planning healthy meals  It shows the types and amounts of foods that should go on your plate  Fruits and vegetables make up about half of your plate, and grains and protein make up the other half  A serving of dairy is included on the side of your plate  The amount of calories and serving sizes you need depends on your age, gender, weight, and height  Examples of healthy foods are listed below:  Eat a variety of vegetables  such as dark green, red, and orange vegetables  You can also include canned vegetables low in sodium (salt) and frozen vegetables without added butter or sauces  Eat a variety of fresh fruits , canned fruit in 100% juice, frozen fruit, and dried fruit  Include whole grains  At least half of the grains you eat should be whole grains  Examples include whole-wheat bread, wheat pasta, brown rice, and whole-grain cereals such as oatmeal     Eat a variety of protein foods such as seafood (fish and shellfish), lean meat, and poultry without skin (turkey and chicken)  Examples of lean meats include pork leg, shoulder, or tenderloin, and beef round, sirloin, tenderloin, and extra lean ground beef  Other protein foods include eggs and egg substitutes, beans, peas, soy products, nuts, and seeds  Choose low-fat dairy products such as skim or 1% milk or low-fat yogurt, cheese, and cottage cheese  Limit unhealthy fats  such as butter, hard margarine, and shortening  Exercise:  Exercise at least 30 minutes per day on most days of the week  Some examples of exercise include walking, biking, dancing, and swimming  You can also fit in more physical activity by taking the stairs instead of the elevator or parking farther away from stores  Include muscle strengthening activities 2 days each week  Regular exercise provides many health benefits   It helps you manage your weight, and decreases your risk for type 2 diabetes, heart disease, stroke, and high blood pressure  Exercise can also help improve your mood  Ask your healthcare provider about the best exercise plan for you  General health and safety guidelines:   Do not smoke  Nicotine and other chemicals in cigarettes and cigars can cause lung damage  Ask your healthcare provider for information if you currently smoke and need help to quit  E-cigarettes or smokeless tobacco still contain nicotine  Talk to your healthcare provider before you use these products  Limit alcohol  A drink of alcohol is 12 ounces of beer, 5 ounces of wine, or 1½ ounces of liquor  Lose weight, if needed  Being overweight increases your risk of certain health conditions  These include heart disease, high blood pressure, type 2 diabetes, and certain types of cancer  Protect your skin  Do not sunbathe or use tanning beds  Use sunscreen with a SPF 15 or higher  Apply sunscreen at least 15 minutes before you go outside  Reapply sunscreen every 2 hours  Wear protective clothing, hats, and sunglasses when you are outside  Drive safely  Always wear your seatbelt  Make sure everyone in your car wears a seatbelt  A seatbelt can save your life if you are in an accident  Do not use your cell phone when you are driving  This could distract you and cause an accident  Pull over if you need to make a call or send a text message  Practice safe sex  Use latex condoms if are sexually active and have more than one partner  Your healthcare provider may recommend screening tests for sexually transmitted infections (STIs)  Wear helmets, lifejackets, and protective gear  Always wear a helmet when you ride a bike or motorcycle, go skiing, or play sports that could cause a head injury  Wear protective equipment when you play sports  Wear a lifejacket when you are on a boat or doing water sports      © Copyright [employer] 2022 Information is for End User's use only and may not be sold, redistributed or otherwise used for commercial purposes  All illustrations and images included in CareNotes® are the copyrighted property of A D A M , Inc  or [employer]  The above information is an  only  It is not intended as medical advice for individual conditions or treatments  Talk to your doctor, nurse or pharmacist before following any medical regimen to see if it is safe and effective for you  Type 2 Diabetes Management for Adults   AMBULATORY CARE:   Type 2 diabetes  is a disease that affects how your body uses glucose (sugar)  Either your body cannot make enough insulin, or it cannot use the insulin correctly  It is important to keep diabetes controlled to prevent damage to your heart, blood vessels, and other organs  Have someone call your local emergency number (911 in the [address] [facility]) if:   You cannot be woken  You have signs of diabetic ketoacidosis:     confusion, fatigue    vomiting    rapid heartbeat    fruity smelling breath    extreme thirst    dry mouth and skin    You have any of the following signs of a heart attack:      Squeezing, pressure, or pain in your chest    You may  also have any of the following:     Discomfort or pain in your back, neck, jaw, stomach, or arm    Shortness of breath    Nausea or vomiting    Lightheadedness or a sudden cold sweat    You have any of the following signs of a stroke:      Numbness or drooping on one side of your face     Weakness in an arm or leg    Confusion or difficulty speaking    Dizziness, a severe headache, or vision loss    Call your doctor or diabetes care team if:   You have a sore or wound that will not heal     You have a change in the amount you urinate  Your blood sugar levels are higher than your target goals  You often have lower blood sugar levels than your target goals  Your skin is red, dry, warm, or swollen  You have trouble coping with diabetes, or you feel anxious or depressed      You have questions or concerns about your condition or care  What you need to know about high blood sugar levels:  High blood sugar levels may not cause any symptoms  You may feel more thirsty or urinate more often than usual  Over time, high blood sugar levels can damage your nerves, blood vessels, tissues, and organs  The following can increase your blood sugar levels:  Large meals or large amounts of carbohydrates at one time    Less physical activity    Stress    Illness    A lower dose of medicine or insulin, or a late dose    What you need to know about low blood sugar levels: You can prevent symptoms such as shakiness, dizziness, irritability, or confusion by preventing your blood sugar levels from going too low  Treat a low blood sugar level right away  Drink 4 ounces of juice or have 1 tube of glucose gel  Check your blood sugar level again 10 to 15 minutes later  When the level goes back to normal, eat a meal or snack to prevent another decrease  Keep glucose gel, raisins, or hard candy with you at all times to treat a low blood sugar level  Your blood sugar level can get too low if you take diabetes medicine or insulin and do not eat enough food  If you use insulin, check your blood sugar level before you exercise  If your blood sugar level is below 100 mg/dL, eat 4 crackers or 2 ounces of raisins, or drink 4 ounces of juice  Check your level every 30 minutes if you exercise more than 1 hour  You may need a snack during or after exercise  What you can do to manage your blood sugar levels:   Check your blood sugar levels as directed and as needed  Several items are available to use to check your levels  You may need to check by testing a drop of blood in a glucose monitor  You may instead be given a continuous glucose monitoring (CGM) device  The device is worn at all times  The CGM checks your blood sugar level every 5 minutes   It sends results to an electronic device such as a smart phone  A CGM can be used with or without an insulin pump  Talk with your provider to find out which method is best for you  The goal for blood sugar levels before meals  is between 80 and 130 mg/dL and 2 hours after eating  is lower than 180 mg/dL  Make healthy food choices  Work with a dietitian to develop a meal plan that works for you and your schedule  A dietitian can help you learn how to eat the right amount of carbohydrates during your meals and snacks  Carbohydrates can raise your blood sugar level if you eat too many at one time  Examples of foods that contain carbohydrates are breads, cereals, rice, pasta, and sweets  Get regular physical activity  Physical activity can help you get to your target blood sugar level goal and manage your weight  Get at least 150 minutes of moderate to vigorous aerobic physical activity each week  Do not miss more than 2 days in a row  Do not sit longer than 30 minutes at a time  Your healthcare provider can help you create an activity plan  The plan can include the best activities for you and can help you build your strength and endurance  Maintain a healthy weight  Ask your healthcare provider what a healthy weight is for you  Ask him or her to help you create a safe weight loss plan if you are overweight  Take your diabetes medicine or insulin as directed  You may need diabetes medicine, insulin, or both to help control your blood sugar levels  Your healthcare provider will teach you how and when to take your diabetes medicine or insulin  You will also be taught about side effects oral diabetes medicine can cause  Insulin may be injected, or given through a pump or pen  You and your care team will discuss which method is best for you  An insulin pump  is an implanted device that gives your insulin 24 hours a day  An insulin pump prevents the need for multiple insulin injections in a day           An insulin pen  is a device prefilled with the right amount of insulin  You and your family members will be taught how to draw up and give insulin  if this is the best method for you  Your education team will also teach you how to dispose of needles and syringes  You will learn how much insulin you need  and when to give it  You will be taught when not to give insulin  You will also be taught what to do if your blood sugar level drops too low  This may happen if you take insulin and do not eat the right amount of carbohydrates  Other things you can do to manage type 2 diabetes:   Wear medical alert identification  Wear medical alert jewelry or carry a card that says you have diabetes  Ask your provider where to get these items  Do not smoke  Nicotine and other chemicals in cigarettes and cigars can cause lung and blood vessel damage  It also makes it more difficult to manage your diabetes  Ask your provider for information if you currently smoke and need help to quit  Do not use e-cigarettes or smokeless tobacco in place of cigarettes or to help you quit  They still contain nicotine  Check your feet each day for cuts, scratches, calluses, or other wounds  Look for redness and swelling, and feel for warmth  Wear shoes that fit well  Check your shoes for rocks or other objects that can hurt your feet  Do not walk barefoot or wear shoes without socks  Wear cotton socks to help keep your feet dry  Ask about vaccines you may need  You have a higher risk for serious illness if you get the flu, pneumonia, COVID-19, or hepatitis  Ask your provider if you should get vaccines to prevent these or other diseases, and when to get the vaccines  Talk to your care team if you become stressed about diabetes care  Sometimes being able to fit diabetes care into your life can cause increased stress  The stress can cause you not to take care of yourself properly   Your care team can help by offering tips about self-care  Your care team may suggest you talk to a mental health provider  The provider can listen and offer help with self-care issues  Follow up with your doctor or diabetes care team as directed: You may need to have blood tests done before your follow-up visit  The test results will show if changes need to be made in your treatment or self-care  Write down your questions so you remember to ask them during your visits  Talk to your provider if you cannot afford your medicine  © Copyright [employer] 2022 Information is for End User's use only and may not be sold, redistributed or otherwise used for commercial purposes  All illustrations and images included in CareNotes® are the copyrighted property of A D A M , Inc  or [facility] Isidra Boothe   The above information is an  only  It is not intended as medical advice for individual conditions or treatments  Talk to your doctor, nurse or pharmacist before following any medical regimen to see if it is safe and effective for you

## 2022-10-19 NOTE — ASSESSMENT & PLAN NOTE
Recent blood work reviewed with patient  Due to GI intolerance to metformin previously, will initiate glimepiride 2 mg  Discussed with patient adherence to lifestyle modifications including diabetic diet, increasing physical activity, maintaining healthy weight  At this time patient is refusing diabetic education  Foot exam completed, patient had eye exam back in June, will paperwork completed  Will re-evaluate in 3 months with repeat blood work      Lab Results   Component Value Date    HGBA1C 11 1 (H) 10/10/2022

## 2022-10-20 ENCOUNTER — TELEPHONE (OUTPATIENT)
Dept: ADMINISTRATIVE | Facility: OTHER | Age: 62
End: 2022-10-20

## 2022-10-20 NOTE — TELEPHONE ENCOUNTER
Upon review of the In Basket request and the patient's chart, initial outreach has been made via fax, please see Contacts section for details       Thank you  Linda Conroy

## 2022-10-20 NOTE — TELEPHONE ENCOUNTER
----- Message from Fazal Trejo sent at 10/19/2022  4:01 PM EDT -----  Regarding: DM Eye Exam  10/19/22 4:01 PM    Mouna, our patient Cheryl Frost has had Diabetic Eye Exam completed/performed  Please assist in updating the patient chart by making an External outreach to [facility] facility located in [facility]  The date of service is 2-3 months ago  Patient couldn't remember the date of service  Their phone number is: [phone]      Thank you,  Fazal DUMONT [address] [facility]

## 2022-10-20 NOTE — LETTER
Diabetic Eye Exam Form    Date Requested: 10/20/22  Patient: Brittney Metzger  Patient : 1960   Referring Provider: YESENIA Lorenzo    DIABETIC Eye Exam Date _______________________________    Type of Exam MUST be documented for Diabetic Eye Exams  Please CHECK ONE  Retinal Exam       Dilated Retinal Exam       OCT       Optomap-Iris Exam      Fundus Photography     Left Eye - Please check Retinopathy AND Type or No Retinopathy      Exam did show retinopathy    Exam did not show retinopathy         Mild     Proliferative           Moderate    Severe            None         Right Eye - Please check Retinopathy AND Type or No Retinopathy     Exam did show retinopathy    Exam did not show retinopathy         Mild     Proliferative        Moderate    Severe        None       Comments __________________________________________________________    Practice Providing Exam ______________________________________________    Exam Performed By (print name) _______________________________________      Provider Signature ___________________________________________________    These reports are needed for  compliance  Please fax this completed form and a copy of the Diabetic Eye Exam report to our office located at [place] [address] as soon as possible via [phone] attention Lindsay: Phone [phone]  We thank you for your assistance in treating our mutual patient

## 2022-10-25 NOTE — TELEPHONE ENCOUNTER
Upon review of the In Basket request we were able to locate, review, and update the patient chart as requested for Diabetic Eye Exam     Any additional questions or concerns should be emailed to the Practice Liaisons via the appropriate education email address, please do not reply via In Basket      Thank you  Sai Grewal

## 2022-11-09 ENCOUNTER — OFFICE VISIT (OUTPATIENT)
Dept: FAMILY MEDICINE CLINIC | Facility: CLINIC | Age: 62
End: 2022-11-09

## 2022-11-09 ENCOUNTER — HOSPITAL ENCOUNTER (OUTPATIENT)
Dept: RADIOLOGY | Facility: HOSPITAL | Age: 62
Discharge: HOME | End: 2022-11-09

## 2022-11-09 ENCOUNTER — TELEPHONE (OUTPATIENT)
Dept: FAMILY MEDICINE CLINIC | Facility: CLINIC | Age: 62
End: 2022-11-09

## 2022-11-09 VITALS
HEART RATE: 108 BPM | DIASTOLIC BLOOD PRESSURE: 92 MMHG | HEIGHT: 70 IN | SYSTOLIC BLOOD PRESSURE: 168 MMHG | WEIGHT: 179.4 LBS | OXYGEN SATURATION: 98 % | BODY MASS INDEX: 25.68 KG/M2 | TEMPERATURE: 98.3 F

## 2022-11-09 DIAGNOSIS — M54.41 ACUTE BILATERAL LOW BACK PAIN WITH BILATERAL SCIATICA: Primary | ICD-10-CM

## 2022-11-09 DIAGNOSIS — M54.41 ACUTE BILATERAL LOW BACK PAIN WITH BILATERAL SCIATICA: ICD-10-CM

## 2022-11-09 DIAGNOSIS — M54.42 ACUTE BILATERAL LOW BACK PAIN WITH BILATERAL SCIATICA: Primary | ICD-10-CM

## 2022-11-09 DIAGNOSIS — M54.42 ACUTE BILATERAL LOW BACK PAIN WITH BILATERAL SCIATICA: ICD-10-CM

## 2022-11-09 RX ORDER — DICLOFENAC SODIUM 75 MG/1
75 TABLET, DELAYED RELEASE ORAL 2 TIMES DAILY
Qty: 60 TABLET | Refills: 0 | Status: SHIPPED | OUTPATIENT
Start: 2022-11-09

## 2022-11-09 RX ORDER — TIZANIDINE HYDROCHLORIDE 4 MG/1
4 CAPSULE, GELATIN COATED ORAL 3 TIMES DAILY PRN
Qty: 60 CAPSULE | Refills: 0 | Status: SHIPPED | OUTPATIENT
Start: 2022-11-09

## 2022-11-09 RX ORDER — LIDOCAINE 50 MG/G
1 PATCH TOPICAL DAILY
Qty: 30 PATCH | Refills: 0 | Status: SHIPPED | OUTPATIENT
Start: 2022-11-09

## 2022-11-09 NOTE — PROGRESS NOTES
Name: Charlene Sanchez      : 1960      MRN: [id number]  Encounter Provider: YESENIA Lindsay  Encounter Date: 2022   Encounter department: [place] [address]  Acute bilateral low back pain with bilateral sciatica  Comments:  Advised heat therapy, lower back stretches, medications as prescribed  Referred to comprehensive spine for PT  Orders:  -     XR spine lumbar minimum 4 views non injury; Future; Expected date: 2022  -     XR sacrum and coccyx; Future; Expected date: 2022  -     Ambulatory Referral to [facility] Program; Future  -     diclofenac (VOLTAREN) 75 mg EC tablet; Take 1 tablet (75 mg total) by mouth 2 (two) times a day  -     lidocaine (LIDODERM) 5 %; Apply 1 patch topically daily Remove & Discard patch within 12 hours or as directed by MD  -     TiZANidine (ZANAFLEX) 4 MG capsule; Take 1 capsule (4 mg total) by mouth 3 (three) times a day as needed for muscle spasms           Subjective      Patient presents the office for evaluation lower back pain  Patient seen in office last month (10/5) for same  Was treated with naproxen, Robaxin, Medrol Dosepak  Patient noted improvement, but over the last week back pain has returned  Stating last night was the worse, had difficulty sleeping  Patient is a contractor and continues to work  Patient states back pain started in the left lower back and was radiating to left lateral leg  Has now moved over to right lower back and is radiating down right lower leg  Patient notes pain as a constant ache  Pain is worse at night, rates pain at this time 6/10  Denies incontinence of bowel or bladder, saddle paresthesia, numbness or tingling in legs, gait difficulty, fever, chills  Patient continues to see chiropractor and obtaining an MRI  Review of Systems   Constitutional: Negative for chills and fever     HENT: Negative for congestion, sore throat and trouble swallowing  Eyes: Negative for photophobia and visual disturbance  Respiratory: Negative for cough and shortness of breath  Cardiovascular: Negative for chest pain, palpitations and leg swelling  Gastrointestinal: Negative for abdominal pain, constipation, nausea and vomiting  Genitourinary: Negative for decreased urine volume, dysuria, flank pain, frequency, hematuria, testicular pain and urgency  Musculoskeletal: Positive for back pain  Negative for arthralgias, gait problem, joint swelling and myalgias  Skin: Negative for color change and rash  Neurological: Negative for dizziness, weakness, light-headedness, numbness and headaches  Psychiatric/Behavioral: Negative for confusion  Current Outpatient Medications on File Prior to Visit   Medication Sig   • atorvastatin (LIPITOR) 20 mg tablet Take 1 tablet (20 mg total) by mouth every evening   • Blood Glucose Monitoring Suppl (OneTouch Verio Reflect) w/Device KIT Check blood sugars once daily  Please substitute with appropriate alternative as covered by patient's insurance  Dx: E11 65   • glimepiride (AMARYL) 2 mg tablet Take 1 tablet (2 mg total) by mouth daily with breakfast   • glucose blood (OneTouch Verio) test strip Check blood sugars once daily  Please substitute with appropriate alternative as covered by patient's insurance  Dx: E11 65   • ibuprofen (MOTRIN) 200 mg tablet Take 800 mg by mouth every 6 (six) hours as needed for mild pain   • OneTouch Delica Lancets 26L MISC Check blood sugars once daily  Please substitute with appropriate alternative as covered by patient's insurance   Dx: E11 65   • [DISCONTINUED] methocarbamol (ROBAXIN) 500 mg tablet Take 1 tablet (500 mg total) by mouth 3 (three) times a day (Patient not taking: Reported on 11/9/2022)       Objective     /92   Pulse (!) 108   Temp 98 3 °F (36 8 °C)   Ht 5' 10" (1 778 m)   Wt 81 4 kg (179 lb 6 4 oz)   SpO2 98%   BMI 25 74 kg/m²     Physical Exam  Vitals reviewed  Constitutional:       General: He is not in acute distress  Appearance: Normal appearance  He is not ill-appearing  HENT:      Head: Normocephalic and atraumatic  Right Ear: External ear normal       Left Ear: External ear normal       Nose: Nose normal       Mouth/Throat:      Mouth: Mucous membranes are moist       Pharynx: Oropharynx is clear  Eyes:      Conjunctiva/sclera: Conjunctivae normal       Pupils: Pupils are equal, round, and reactive to light  Cardiovascular:      Rate and Rhythm: Regular rhythm  Tachycardia present  Pulses: Normal pulses  Heart sounds: Normal heart sounds  No murmur heard  Pulmonary:      Effort: Pulmonary effort is normal       Breath sounds: Normal breath sounds  Abdominal:      General: Bowel sounds are normal       Palpations: Abdomen is soft  Tenderness: There is no abdominal tenderness  There is no right CVA tenderness or left CVA tenderness  Musculoskeletal:         General: Normal range of motion  Cervical back: Normal, full passive range of motion without pain, normal range of motion and neck supple  No tenderness or bony tenderness  Thoracic back: Normal  No bony tenderness  Lumbar back: Tenderness (to b/l lower paraspinal muscles) present  No bony tenderness  Negative right straight leg raise test and negative left straight leg raise test       Right lower leg: No edema  Left lower leg: No edema  Skin:     General: Skin is warm and dry  Capillary Refill: Capillary refill takes less than 2 seconds  Neurological:      General: No focal deficit present  Mental Status: He is alert and oriented to person, place, and time     Psychiatric:         Mood and Affect: Mood normal          Behavior: Behavior normal        YESENIA Lindsay

## 2022-11-09 NOTE — TELEPHONE ENCOUNTER
Anisa Dhillon from [facility] pharmacy called need prior Auth for lidocaine patch call [phone]

## 2022-11-09 NOTE — PATIENT INSTRUCTIONS
Lower Back Exercises   AMBULATORY CARE:   Lower back exercises  help heal and strengthen your back muscles to prevent another injury  Ask your healthcare provider if you need to see a physical therapist for more advanced exercises  Seek care immediately if:   You have severe pain that prevents you from moving  Contact your healthcare provider if:   Your pain becomes worse  You have new pain  You have questions or concerns about your condition or care  Do lower back exercises safely:   Do the exercises on a mat or firm surface  (not on a bed) to support your spine and prevent low back pain  Move slowly and smoothly  Avoid fast or jerky motions  Breathe normally  Do not hold your breath  Stop if you feel pain  It is normal to feel some discomfort at first  Regular exercise will help decrease your discomfort over time  Lower back exercises: Your healthcare provider may recommend that you do back exercises 10 to 30 minutes each day  He may also recommend that you do exercises 1 to 3 times each day  Ask your healthcare provider which exercises are best for you and how often to do them  Ankle pumps:  Lie on your back  Move your foot up (with your toes pointing toward your head)  Then, move your foot down (with your toes pointing away from you)  Repeat this exercise 10 times on each side  Heel slides:  Lie on your back  Slowly bend one leg and then straighten it  Next, bend the other leg and then straighten it  Repeat 10 times on each side  Pelvic tilt:  Lie on your back with your knees bent and feet flat on the floor  Place your arms in a relaxed position beside your body  Tighten the muscles of your abdomen and flatten your back against the floor  Hold for 5 seconds  Repeat 5 times  Back stretch:  Lie on your back with your hands behind your head  Bend your knees and turn the lower half of your body to one side  Hold this position for 10 seconds   Repeat 3 times on each side  Straight leg raises:  Lie on your back with one leg straight  Bend the other knee  Tighten your abdomen and then slowly lift the straight leg up about 6 to 12 inches off the floor  Hold for 1 to 5 seconds  Lower your leg slowly  Repeat 10 times on each leg  Knee-to-chest:  Lie on your back with your knees bent and feet flat on the floor  Pull one of your knees toward your chest and hold it there for 5 seconds  Return your leg to the starting position  Lift the other knee toward your chest and hold for 5 seconds  Do this 5 times on each side  Cat and camel:  Place your hands and knees on the floor  Arch your back upward toward the ceiling and lower your head  Round out your spine as much as you can  Hold for 5 seconds  Lift your head upward and push your chest downward toward the floor  Hold for 5 seconds  Do 3 sets or as directed  Wall squats:  Stand with your back against a wall  Tighten the muscles of your abdomen  Slowly lower your body until your knees are bent at a 45 degree angle  Hold this position for 5 seconds  Slowly move back up to a standing position  Repeat 10 times  Curl up:  Lie on your back with your knees bent and feet flat on the floor  Place your hands, palms down, underneath the curve in your lower back  Next, with your elbows on the floor, lift your shoulders and chest 2 to 3 inches  Keep your head in line with your shoulders  Hold this position for 5 seconds  When you can do this exercise without pain for 10 to 15 seconds, you may add a rotation  While your shoulders and chest are lifted off the ground, turn slightly to the left and hold  Repeat on the other side  Bird dog:  Place your hands and knees on the floor  Keep your wrists directly below your shoulders and your knees directly below your hips  Pull your belly button in toward your spine  Do not flatten or arch your back  Tighten your abdominal muscles   Raise one arm straight out so that it is aligned with your head  Next, raise the leg opposite your arm  Hold this position for 15 seconds  Lower your arm and leg slowly and change sides  Do 5 sets  © Copyright [employer] 2022 Information is for End User's use only and may not be sold, redistributed or otherwise used for commercial purposes  All illustrations and images included in CareNotes® are the copyrighted property of A D A M , Inc  or [facility] Isidra Boothe   The above information is an  only  It is not intended as medical advice for individual conditions or treatments  Talk to your doctor, nurse or pharmacist before following any medical regimen to see if it is safe and effective for you

## 2022-11-14 ENCOUNTER — APPOINTMENT (EMERGENCY)
Dept: CT IMAGING | Facility: HOSPITAL | Age: 62
End: 2022-11-14

## 2022-11-14 ENCOUNTER — HOSPITAL ENCOUNTER (EMERGENCY)
Facility: HOSPITAL | Age: 62
Discharge: HOME | End: 2022-11-14
Attending: EMERGENCY MEDICINE

## 2022-11-14 VITALS
TEMPERATURE: 97.1 F | HEART RATE: 104 BPM | OXYGEN SATURATION: 98 % | DIASTOLIC BLOOD PRESSURE: 89 MMHG | SYSTOLIC BLOOD PRESSURE: 181 MMHG | RESPIRATION RATE: 16 BRPM

## 2022-11-14 DIAGNOSIS — M54.50 ACUTE LOW BACK PAIN: Primary | ICD-10-CM

## 2022-11-14 RX ORDER — HYDROCODONE BITARTRATE AND ACETAMINOPHEN 5; 325 MG/1; MG/1
1 TABLET ORAL EVERY 6 HOURS PRN
Qty: 12 TABLET | Refills: 0 | Status: SHIPPED | OUTPATIENT
Start: 2022-11-14

## 2022-11-14 RX ORDER — HYDROMORPHONE HCL/PF 1 MG/ML
1 SYRINGE (ML) INJECTION ONCE
Status: COMPLETED | OUTPATIENT
Start: 2022-11-14 | End: 2022-11-14

## 2022-11-14 RX ADMIN — HYDROMORPHONE HYDROCHLORIDE 1 MG: 1 INJECTION, SOLUTION INTRAMUSCULAR; INTRAVENOUS; SUBCUTANEOUS at 12:29

## 2022-11-14 NOTE — ED PROVIDER NOTES
History  Chief Complaint   Patient presents with   • Back Pain     Patient c/o lower back pain that radiates down both legs  Patient states"he has a history of sciatica"  Patient had and xray on Tuesday  No fall or injury     HPI  57 yo M presents with low back pain radiating down both legs  He has had symptoms for the past month, improved and returned last week  Saw PCP and started on muscle relaxant, lidocaine patches and voltaren which patient reports are not helping him  He feels that his work exacerbates the pain  No bowel/bladder incontinence, saddle anesthesia, weakness  Does report tingling down both legs  Prior to Admission Medications   Prescriptions Last Dose Informant Patient Reported? Taking? Blood Glucose Monitoring Suppl (OneTouch Verio Reflect) w/Device KIT   No No   Sig: Check blood sugars once daily  Please substitute with appropriate alternative as covered by patient's insurance  Dx: I51 20   OneTouch Delica Lancets 77Q MISC   No No   Sig: Check blood sugars once daily  Please substitute with appropriate alternative as covered by patient's insurance  Dx: E11 65   TiZANidine (ZANAFLEX) 4 MG capsule   No No   Sig: Take 1 capsule (4 mg total) by mouth 3 (three) times a day as needed for muscle spasms   atorvastatin (LIPITOR) 20 mg tablet   No No   Sig: Take 1 tablet (20 mg total) by mouth every evening   diclofenac (VOLTAREN) 75 mg EC tablet   No No   Sig: Take 1 tablet (75 mg total) by mouth 2 (two) times a day   glimepiride (AMARYL) 2 mg tablet   No No   Sig: Take 1 tablet (2 mg total) by mouth daily with breakfast   glucose blood (OneTouch Verio) test strip   No No   Sig: Check blood sugars once daily  Please substitute with appropriate alternative as covered by patient's insurance   Dx: E11 65   ibuprofen (MOTRIN) 200 mg tablet   Yes No   Sig: Take 800 mg by mouth every 6 (six) hours as needed for mild pain   lidocaine (LIDODERM) 5 %   No No   Sig: Apply 1 patch topically daily Remove & Discard patch within 12 hours or as directed by MD      Facility-Administered Medications: None       Past Medical History:   Diagnosis Date   • Diabetes mellitus ([facility] )    • Hyperlipidemia    • Sciatica    • Type 2 diabetes mellitus ([facility] )        History reviewed  No pertinent surgical history  Family History   Problem Relation Age of Onset   • Diabetes Mother    • Alzheimer's disease Mother    • Dementia Father    • Prostate cancer Father    • Hypertension Father    • Hyperlipidemia Father      I have reviewed and agree with the history as documented  E-Cigarette/Vaping   • E-Cigarette Use Never User      E-Cigarette/Vaping Substances   • Nicotine No    • THC No    • CBD No    • Flavoring No    • Other No    • Unknown No      Social History     Tobacco Use   • Smoking status: Current Some Day Smoker     Years: 10 00     Types: Cigars   • Smokeless tobacco: Never Used   • Tobacco comment: Occasionally   Vaping Use   • Vaping Use: Never used   Substance Use Topics   • Alcohol use: Yes   • Drug use: Not Currently       Review of Systems   Constitutional: Negative for chills and fever  HENT: Negative for dental problem and ear pain  Eyes: Negative for pain and redness  Respiratory: Negative for cough and shortness of breath  Cardiovascular: Negative for chest pain and palpitations  Gastrointestinal: Negative for abdominal pain and nausea  Endocrine: Negative for polydipsia and polyphagia  Genitourinary: Negative for dysuria and frequency  Musculoskeletal: Positive for back pain  Negative for arthralgias and joint swelling  Skin: Negative for color change and rash  Neurological: Negative for dizziness and headaches  Psychiatric/Behavioral: Negative for behavioral problems and confusion  All other systems reviewed and are negative  Physical Exam  Physical Exam  Vitals and nursing note reviewed  Constitutional:       General: He is not in acute distress  Appearance: He is well-developed  He is not diaphoretic  HENT:      Head: Atraumatic  Right Ear: External ear normal       Left Ear: External ear normal       Nose: Nose normal    Eyes:      Conjunctiva/sclera: Conjunctivae normal       Pupils: Pupils are equal, round, and reactive to light  Neck:      Vascular: No JVD  Cardiovascular:      Rate and Rhythm: Normal rate and regular rhythm  Heart sounds: Normal heart sounds  No murmur heard  Pulmonary:      Effort: Pulmonary effort is normal  No respiratory distress  Breath sounds: Normal breath sounds  No wheezing  Abdominal:      General: Bowel sounds are normal  There is no distension  Palpations: Abdomen is soft  Tenderness: There is abdominal tenderness  Musculoskeletal:         General: Normal range of motion  Cervical back: Normal range of motion and neck supple  Skin:     General: Skin is warm and dry  Capillary Refill: Capillary refill takes less than 2 seconds  Neurological:      Mental Status: He is alert and oriented to person, place, and time  Cranial Nerves: No cranial nerve deficit     Psychiatric:         Behavior: Behavior normal          Vital Signs  ED Triage Vitals [11/14/22 1118]   Temperature Pulse Respirations Blood Pressure SpO2   (!) 97 1 °F (36 2 °C) 104 16 (!) 181/89 98 %      Temp Source Heart Rate Source Patient Position - Orthostatic VS BP Location FiO2 (%)   Tympanic Monitor Sitting Left arm --      Pain Score       --           Vitals:    11/14/22 1118   BP: (!) 181/89   Pulse: 104   Patient Position - Orthostatic VS: Sitting         Visual Acuity      ED Medications  Medications   HYDROmorphone (DILAUDID) injection 1 mg (1 mg Intramuscular Given 11/14/22 1229)       Diagnostic Studies  Results Reviewed     None                 CT spine lumbar without contrast   Final Result by Diana Baldwin MD (11/14 1323)      Left foraminal protrusion at L4-5 with moderate to severe left foraminal narrowing and abutment / mild impingement of the exiting left L4 nerve root  Facet joint disease at L4-5 and L5-S1   No acute compression collapse of the vertebra      Workstation performed: [id number]                    Procedures  Procedures         ED Course                               SBIRT 22yo+    Flowsheet Row Most Recent Value   SBIRT (23 yo +)    In order to provide better care to our patients, we are screening all of our patients for alcohol and drug use  Would it be okay to ask you these screening questions? Yes Filed at: 11/14/2022 1216   Initial Alcohol Screen: US AUDIT-C     1  How often do you have a drink containing alcohol? 0 Filed at: 11/14/2022 1216   2  How many drinks containing alcohol do you have on a typical day you are drinking? 0 Filed at: 11/14/2022 1216   3a  Male UNDER 65: How often do you have five or more drinks on one occasion? 0 Filed at: 11/14/2022 1216   3b  FEMALE Any Age, or MALE 65+: How often do you have 4 or more drinks on one occassion? 0 Filed at: 11/14/2022 1216   Audit-C Score 0 Filed at: 11/14/2022 1216   DARIAN: How many times in the past year have you    Used an illegal drug or used a prescription medication for non-medical reasons? Never Filed at: 11/14/2022 1216                    MDM  57 yo M presents with low back pain  No red flags in history or exam doubt cauda equina  CT shows foraminal protrusion  Discussed referral to comprehensive spine  Disposition  Final diagnoses:   Acute low back pain     Time reflects when diagnosis was documented in both MDM as applicable and the Disposition within this note     Time User Action Codes Description Comment    11/14/2022 12:51 PM Chuyita Ambrose Add [M54 50] Acute low back pain       ED Disposition     ED Disposition   Discharge    Condition   Stable    Date/Time   Mon Nov 14, 2022  1:29 PM    Comment   Aris Dempsey discharge to home/self care                 Follow-up Information     Follow up With Specialties Details Why Contact Info Additional Information    [facility] Physical Therapy Call   [phone]          Discharge Medication List as of 11/14/2022  1:31 PM      START taking these medications    Details   HYDROcodone-acetaminophen (NORCO) 5-325 mg per tablet Take 1 tablet by mouth every 6 (six) hours as needed for pain Max Daily Amount: 4 tablets, Starting Mon 11/14/2022, Normal         CONTINUE these medications which have NOT CHANGED    Details   atorvastatin (LIPITOR) 20 mg tablet Take 1 tablet (20 mg total) by mouth every evening, Starting Wed 10/19/2022, Until Mon 4/17/2023, Normal      Blood Glucose Monitoring Suppl (OneTouch Verio Reflect) w/Device KIT Check blood sugars once daily  Please substitute with appropriate alternative as covered by patient's insurance  Dx: E11 65, Normal      diclofenac (VOLTAREN) 75 mg EC tablet Take 1 tablet (75 mg total) by mouth 2 (two) times a day, Starting Wed 11/9/2022, Normal      glimepiride (AMARYL) 2 mg tablet Take 1 tablet (2 mg total) by mouth daily with breakfast, Starting Wed 10/19/2022, Until Mon 4/17/2023, Normal      glucose blood (OneTouch Verio) test strip Check blood sugars once daily  Please substitute with appropriate alternative as covered by patient's insurance  Dx: E11 65, Normal      ibuprofen (MOTRIN) 200 mg tablet Take 800 mg by mouth every 6 (six) hours as needed for mild pain, Historical Med      lidocaine (LIDODERM) 5 % Apply 1 patch topically daily Remove & Discard patch within 12 hours or as directed by MD, Starting Wed 11/9/2022, Normal      OneTouch Delica Lancets 47A MISC Check blood sugars once daily  Please substitute with appropriate alternative as covered by patient's insurance   Dx: E11 65, Normal      TiZANidine (ZANAFLEX) 4 MG capsule Take 1 capsule (4 mg total) by mouth 3 (three) times a day as needed for muscle spasms, Starting Wed 11/9/2022, Normal                 PDMP Review     None          ED Provider  Electronically Signed by           Nam Webster MD  11/14/22 7810

## 2022-11-15 ENCOUNTER — TELEPHONE (OUTPATIENT)
Dept: PHYSICAL THERAPY | Facility: OTHER | Age: 62
End: 2022-11-15

## 2022-11-15 NOTE — TELEPHONE ENCOUNTER
Nurse reached out to discuss recent referral entered for  Comprehensive Sp[facility] program     Patient referred to the program on 11/9/22 by his PCP  Referral entered into EMR for PT Spine and scheduled evaluation  Patient seen in ED yesterday where a second referral to  CSP was entered  This nurse called pt per protocol and discussed the above and moving forward with his plan of care  Patient confirmed he is scheduled to see SL PM 11/21/22 and PT on 12/2/22  Patient declined to participate in the program at this time and does not require  CSP services  Nurse wished him well and referral closed   Patient very appreciative of f/u call to address his care and new referral

## 2022-11-16 NOTE — H&P (VIEW-ONLY)
Assessment:  1  Lumbar radiculopathy    2  Lumbar facet arthropathy    3  Weakness of left foot        Plan:  Orders Placed This Encounter   Procedures   • FL spine and pain procedure     Standing Status:   Future     Standing Expiration Date:   11/21/2026     Order Specific Question:   Reason for Exam:     Answer:   left L4 and L5 TFESI depo     Order Specific Question:   Anticoagulant hold needed? Answer:   no       No orders of the defined types were placed in this encounter  My impressions and treatment recommendations were discussed in detail with the patient, who verbalized understanding and had no further questions  60-year-old male who presents to our office chief complaint of bilateral anterior numbness and burning pain as well as predominantly left lower extremity radicular symptoms  Pain travels past the knee into the calf into the dorsum of the ankle  Has notable left foot dorsiflexion left toe dorsiflexion weakness  CT of the spine showing left L4 nerve compression  Clinically symptomatic from lumbar radiculopathy  Discussed left L4 and L5 transforaminal epidural steroid injection under fluoroscopic guidance  He will also start physical therapy  Will re-evaluate patient  If strength is not improved, would need to get an MRI and possibly send him to see Orthopedic surgery  Regards pharmacological treatment options, recommended starting gabapentin which he declines as he does not want to be on pills  He did request refill of hydrocodone however which I declined  If he does wish to continue with medications, would recommend gabapentin instead  [address] [facility] Prescription Drug Monitoring Program report was reviewed and was appropriate     Complete risks and benefits including bleeding, infection, tissue reaction, nerve injury and allergic reaction were discussed  The approach was demonstrated using models and literature was provided   Verbal and written consent was obtained  Discharge instructions were provided  I personally saw and examined the patient and I agree with the above discussed plan of care  History of Present Illness:    Luisa Suh is a 58 y o  male who presents to [facility] for initial evaluation of the above stated pain complaints  The patient has a past medical and chronic pain history as outlined in the assessment section  He was referred by Referral Self  No address on file   Chief complaint of back and bilateral leg pain  This is a rather acute issue over last month and a half  Recently seen in the ED on 11/14/2022 for worsening of this issue  Initially saw his PCP  Was given Robaxin, Medrol Dosepak, naproxen with limited improvement  In ED was given hydrocodone which helped  Undetermined cause  Moderate intensity  8/10  Nearly constant  Worse in the evening and night  Pain is burning, numbness, pins and needles, dull/aching  Reports weakness in lower extremities  No assistive device  Reports numbness in anterior thighs , but on the left side he has numbness And tightness down the whole leg with hypersensitivity and numbness in the top of the foot  Left calf feels numb  Increased with lying down  No change with bending, sitting, exercising, relaxation, coughing, sneezing  Decreased with standing and walking  CT scan done in the ED shows multilevel disease  Left foraminal disc protrusion at L4-5 with notable left foraminal narrowing  Has history of type 2 diabetes, uncontrolled with A1c at [age]    Reports moderate relief with heat/ice therapy or chiropractic manipulation  No tobacco or marijuana use  Drinks alcohol  Not on blood thinners  Not allergic to latex or contrast dye  In the past ibuprofen has provided relief  Review of Systems:    Review of Systems   Constitutional: Negative for fever and unexpected weight change  HENT: Negative for trouble swallowing      Eyes: Negative for visual disturbance  Respiratory: Negative for shortness of breath and wheezing  Cardiovascular: Negative for chest pain and palpitations  Gastrointestinal: Negative for constipation, diarrhea, nausea and vomiting  Endocrine: Negative for cold intolerance, heat intolerance and polydipsia  Genitourinary: Negative for difficulty urinating and frequency  Musculoskeletal: Negative for arthralgias, gait problem, joint swelling and myalgias  Pain in lower back radiating into both legs with numbness in the left calf with pins and needle with tender to the touch numbness in toes    Skin: Negative for rash  Neurological: Negative for dizziness, seizures, syncope, weakness and headaches  Hematological: Does not bruise/bleed easily  Psychiatric/Behavioral: Negative for dysphoric mood  All other systems reviewed and are negative  Past Medical History:   Diagnosis Date   • Diabetes mellitus ([facility] )    • Hyperlipidemia    • Sciatica    • Type 2 diabetes mellitus ([facility] )        History reviewed  No pertinent surgical history  Family History   Problem Relation Age of Onset   • Diabetes Mother    • Alzheimer's disease Mother    • Dementia Father    • Prostate cancer Father    • Hypertension Father    • Hyperlipidemia Father        Social History     Occupational History   • Not on file   Tobacco Use   • Smoking status: Some Days     Types: Cigars   • Smokeless tobacco: Never   • Tobacco comments:     Occasionally   Vaping Use   • Vaping Use: Never used   Substance and Sexual Activity   • Alcohol use: Yes   • Drug use: Not Currently   • Sexual activity: Not on file         Current Outpatient Medications:   •  atorvastatin (LIPITOR) 20 mg tablet, Take 1 tablet (20 mg total) by mouth every evening, Disp: 90 tablet, Rfl: 1  •  Blood Glucose Monitoring Suppl (OneTouch Verio Reflect) w/Device KIT, Check blood sugars once daily   Please substitute with appropriate alternative as covered by patient's insurance  Dx: E11 65, Disp: 1 kit, Rfl: 0  •  diclofenac (VOLTAREN) 75 mg EC tablet, Take 1 tablet (75 mg total) by mouth 2 (two) times a day, Disp: 60 tablet, Rfl: 0  •  glimepiride (AMARYL) 2 mg tablet, Take 1 tablet (2 mg total) by mouth daily with breakfast, Disp: 90 tablet, Rfl: 0  •  glucose blood (OneTouch Verio) test strip, Check blood sugars once daily  Please substitute with appropriate alternative as covered by patient's insurance  Dx: E11 65, Disp: 100 each, Rfl: 3  •  HYDROcodone-acetaminophen (NORCO) 5-325 mg per tablet, Take 1 tablet by mouth every 6 (six) hours as needed for pain Max Daily Amount: 4 tablets, Disp: 12 tablet, Rfl: 0  •  ibuprofen (MOTRIN) 200 mg tablet, Take 800 mg by mouth every 6 (six) hours as needed for mild pain, Disp: , Rfl:   •  lidocaine (LIDODERM) 5 %, Apply 1 patch topically daily Remove & Discard patch within 12 hours or as directed by MD, Disp: 30 patch, Rfl: 0  •  OneTouch Delica Lancets 85N MISC, Check blood sugars once daily  Please substitute with appropriate alternative as covered by patient's insurance  Dx: E11 65, Disp: 100 each, Rfl: 3  •  TiZANidine (ZANAFLEX) 4 MG capsule, Take 1 capsule (4 mg total) by mouth 3 (three) times a day as needed for muscle spasms, Disp: 60 capsule, Rfl: 0    No Known Allergies    Physical Exam:    BP (!) 172/102 (BP Location: Left arm, Patient Position: Sitting, Cuff Size: Standard)   Pulse 93   Wt 80 7 kg (178 lb)   BMI 25 54 kg/m²     Constitutional: normal, well developed, well nourished, alert, in no distress and non-toxic and no overt pain behavior    Eyes: anicteric  HEENT: grossly intact  Neck: supple, symmetric, trachea midline and no masses   Pulmonary:even and unlabored  Cardiovascular:No edema or pitting edema present  Skin:Normal without rashes or lesions and well hydrated  Psychiatric:Mood and affect appropriate  Neurologic:Cranial Nerves II-XII grossly intact  Musculoskeletal:normal     Lumbar Spine Exam    Appearance:  Normal lordosis  Palpation/Tenderness:  no tenderness or spasm  Sensory:  no sensory deficits noted  Motor Strength:  Left hip flexion:  5/5  Left hip extension:  5/5  Right hip flexion:  5/5  Right hip extension:  5/5  Left knee flexion:  5/5  Left knee extension:  5/5  Right knee flexion:  5/5  Right knee extension:  5/5  Left foot dorsiflexion:  4/5  Left foot plantar flexion:  5/5  Right foot dorsiflexion:  5/5  Right foot plantar flexion:  5/5   Reflexes:  Left Patellar:  2+   Right Patellar:  2+   Left Achilles:  2+   Right Achilles:  2+   Special Tests:  Left Straight Leg Test:  negative  Right Straight Leg Test:  negative      Imaging  CT LUMBAR SPINE  11/14/22     INDICATION:   low back pain      COMPARISON: None      TECHNIQUE:  Contiguous axial images through the lumbar spine were obtained  Sagittal and coronal reconstructions were performed        IV Contrast:  None     Radiation dose length product (DLP) for this visit:  562 mGy-cm   This examination, like all CT scans performed in the [place], was performed utilizing techniques to minimize radiation dose exposure, including the use of iterative   reconstruction and automated exposure control        IMAGE QUALITY:  Diagnostic      FINDINGS:     ALIGNMENT:  There are 5 lumbar type vertebral bodies  Normal alignment of the lumbar spine  No spondylolysis or spondylolisthesis      VERTEBRAL BODIES:  No fracture  No lytic or blastic lesion      DEGENERATIVE CHANGES:     Lower Thoracic spine:  Normal lower thoracic disc spaces      L1-2:  Normal disc height  No herniation  Normal facet joints    No canal or foraminal stenosis      L2-3:  Appears unremarkable with no significant central canal narrowing of foraminal narrowing     L3-4:  Mild facet joint disease seen with disc bulge with no significant central canal narrowing of foraminal narrowing     L4-5:  Demonstrates left foraminal protrusion with moderate to severe left foraminal narrowing  This abuts the exiting left L4 nerve root  There is no significant central canal narrowing  There is facet joint disease     L5-S1:  Disc bulge seen with facet joint disease with no significant central canal narrowing or foraminal narrowing     PARASPINAL SOFT TISSUES:   Normal   Nonobstructing calculus seen right kidney with single 2 mm  IMPRESSION:     Left foraminal protrusion at L4-5 with moderate to severe left foraminal narrowing and abutment / mild impingement of the exiting left L4 nerve root    Facet joint disease at L4-5 and L5-S1  No acute compression collapse of the vertebra     FL spine and pain procedure    (Results Pending)       Orders Placed This Encounter   Procedures   • FL spine and pain procedure

## 2022-11-16 NOTE — PROGRESS NOTES
Assessment:  1  Lumbar radiculopathy    2  Lumbar facet arthropathy    3  Weakness of left foot        Plan:  Orders Placed This Encounter   Procedures   • FL spine and pain procedure     Standing Status:   Future     Standing Expiration Date:   11/21/2026     Order Specific Question:   Reason for Exam:     Answer:   left L4 and L5 TFESI depo     Order Specific Question:   Anticoagulant hold needed? Answer:   no       No orders of the defined types were placed in this encounter  My impressions and treatment recommendations were discussed in detail with the patient, who verbalized understanding and had no further questions  51-year-old male who presents to our office chief complaint of bilateral anterior numbness and burning pain as well as predominantly left lower extremity radicular symptoms  Pain travels past the knee into the calf into the dorsum of the ankle  Has notable left foot dorsiflexion left toe dorsiflexion weakness  CT of the spine showing left L4 nerve compression  Clinically symptomatic from lumbar radiculopathy  Discussed left L4 and L5 transforaminal epidural steroid injection under fluoroscopic guidance  He will also start physical therapy  Will re-evaluate patient  If strength is not improved, would need to get an MRI and possibly send him to see Orthopedic surgery  Regards pharmacological treatment options, recommended starting gabapentin which he declines as he does not want to be on pills  He did request refill of hydrocodone however which I declined  If he does wish to continue with medications, would recommend gabapentin instead  [place] Prescription Drug Monitoring Program report was reviewed and was appropriate     Complete risks and benefits including bleeding, infection, tissue reaction, nerve injury and allergic reaction were discussed  The approach was demonstrated using models and literature was provided   Verbal and written consent was obtained  Discharge instructions were provided  I personally saw and examined the patient and I agree with the above discussed plan of care  History of Present Illness:    Shant Wayne is a 58 y o  male who presents to [facility] for initial evaluation of the above stated pain complaints  The patient has a past medical and chronic pain history as outlined in the assessment section  He was referred by Referral Self  No address on file   Chief complaint of back and bilateral leg pain  This is a rather acute issue over last month and a half  Recently seen in the ED on 11/14/2022 for worsening of this issue  Initially saw his PCP  Was given Robaxin, Medrol Dosepak, naproxen with limited improvement  In ED was given hydrocodone which helped  Undetermined cause  Moderate intensity  8/10  Nearly constant  Worse in the evening and night  Pain is burning, numbness, pins and needles, dull/aching  Reports weakness in lower extremities  No assistive device  Reports numbness in anterior thighs , but on the left side he has numbness And tightness down the whole leg with hypersensitivity and numbness in the top of the foot  Left calf feels numb  Increased with lying down  No change with bending, sitting, exercising, relaxation, coughing, sneezing  Decreased with standing and walking  CT scan done in the ED shows multilevel disease  Left foraminal disc protrusion at L4-5 with notable left foraminal narrowing  Has history of type 2 diabetes, uncontrolled with A1c at [age]    Reports moderate relief with heat/ice therapy or chiropractic manipulation  No tobacco or marijuana use  Drinks alcohol  Not on blood thinners  Not allergic to latex or contrast dye  In the past ibuprofen has provided relief  Review of Systems:    Review of Systems   Constitutional: Negative for fever and unexpected weight change  HENT: Negative for trouble swallowing      Eyes: Negative for visual disturbance  Respiratory: Negative for shortness of breath and wheezing  Cardiovascular: Negative for chest pain and palpitations  Gastrointestinal: Negative for constipation, diarrhea, nausea and vomiting  Endocrine: Negative for cold intolerance, heat intolerance and polydipsia  Genitourinary: Negative for difficulty urinating and frequency  Musculoskeletal: Negative for arthralgias, gait problem, joint swelling and myalgias  Pain in lower back radiating into both legs with numbness in the left calf with pins and needle with tender to the touch numbness in toes    Skin: Negative for rash  Neurological: Negative for dizziness, seizures, syncope, weakness and headaches  Hematological: Does not bruise/bleed easily  Psychiatric/Behavioral: Negative for dysphoric mood  All other systems reviewed and are negative  Past Medical History:   Diagnosis Date   • Diabetes mellitus ([facility] )    • Hyperlipidemia    • Sciatica    • Type 2 diabetes mellitus ([facility] )        History reviewed  No pertinent surgical history  Family History   Problem Relation Age of Onset   • Diabetes Mother    • Alzheimer's disease Mother    • Dementia Father    • Prostate cancer Father    • Hypertension Father    • Hyperlipidemia Father        Social History     Occupational History   • Not on file   Tobacco Use   • Smoking status: Some Days     Types: Cigars   • Smokeless tobacco: Never   • Tobacco comments:     Occasionally   Vaping Use   • Vaping Use: Never used   Substance and Sexual Activity   • Alcohol use: Yes   • Drug use: Not Currently   • Sexual activity: Not on file         Current Outpatient Medications:   •  atorvastatin (LIPITOR) 20 mg tablet, Take 1 tablet (20 mg total) by mouth every evening, Disp: 90 tablet, Rfl: 1  •  Blood Glucose Monitoring Suppl (OneTouch Verio Reflect) w/Device KIT, Check blood sugars once daily   Please substitute with appropriate alternative as covered by patient's insurance  Dx: E11 65, Disp: 1 kit, Rfl: 0  •  diclofenac (VOLTAREN) 75 mg EC tablet, Take 1 tablet (75 mg total) by mouth 2 (two) times a day, Disp: 60 tablet, Rfl: 0  •  glimepiride (AMARYL) 2 mg tablet, Take 1 tablet (2 mg total) by mouth daily with breakfast, Disp: 90 tablet, Rfl: 0  •  glucose blood (OneTouch Verio) test strip, Check blood sugars once daily  Please substitute with appropriate alternative as covered by patient's insurance  Dx: E11 65, Disp: 100 each, Rfl: 3  •  HYDROcodone-acetaminophen (NORCO) 5-325 mg per tablet, Take 1 tablet by mouth every 6 (six) hours as needed for pain Max Daily Amount: 4 tablets, Disp: 12 tablet, Rfl: 0  •  ibuprofen (MOTRIN) 200 mg tablet, Take 800 mg by mouth every 6 (six) hours as needed for mild pain, Disp: , Rfl:   •  lidocaine (LIDODERM) 5 %, Apply 1 patch topically daily Remove & Discard patch within 12 hours or as directed by MD, Disp: 30 patch, Rfl: 0  •  OneTouch Delica Lancets 49R MISC, Check blood sugars once daily  Please substitute with appropriate alternative as covered by patient's insurance  Dx: E11 65, Disp: 100 each, Rfl: 3  •  TiZANidine (ZANAFLEX) 4 MG capsule, Take 1 capsule (4 mg total) by mouth 3 (three) times a day as needed for muscle spasms, Disp: 60 capsule, Rfl: 0    No Known Allergies    Physical Exam:    BP (!) 172/102 (BP Location: Left arm, Patient Position: Sitting, Cuff Size: Standard)   Pulse 93   Wt 80 7 kg (178 lb)   BMI 25 54 kg/m²     Constitutional: normal, well developed, well nourished, alert, in no distress and non-toxic and no overt pain behavior    Eyes: anicteric  HEENT: grossly intact  Neck: supple, symmetric, trachea midline and no masses   Pulmonary:even and unlabored  Cardiovascular:No edema or pitting edema present  Skin:Normal without rashes or lesions and well hydrated  Psychiatric:Mood and affect appropriate  Neurologic:Cranial Nerves II-XII grossly intact  Musculoskeletal:normal     Lumbar Spine Exam    Appearance:  Normal lordosis  Palpation/Tenderness:  no tenderness or spasm  Sensory:  no sensory deficits noted  Motor Strength:  Left hip flexion:  5/5  Left hip extension:  5/5  Right hip flexion:  5/5  Right hip extension:  5/5  Left knee flexion:  5/5  Left knee extension:  5/5  Right knee flexion:  5/5  Right knee extension:  5/5  Left foot dorsiflexion:  4/5  Left foot plantar flexion:  5/5  Right foot dorsiflexion:  5/5  Right foot plantar flexion:  5/5   Reflexes:  Left Patellar:  2+   Right Patellar:  2+   Left Achilles:  2+   Right Achilles:  2+   Special Tests:  Left Straight Leg Test:  negative  Right Straight Leg Test:  negative      Imaging  CT LUMBAR SPINE  11/14/22     INDICATION:   low back pain      COMPARISON: None      TECHNIQUE:  Contiguous axial images through the lumbar spine were obtained  Sagittal and coronal reconstructions were performed        IV Contrast:  None     Radiation dose length product (DLP) for this visit:  562 mGy-cm   This examination, like all CT scans performed in the [facility], was performed utilizing techniques to minimize radiation dose exposure, including the use of iterative   reconstruction and automated exposure control        IMAGE QUALITY:  Diagnostic      FINDINGS:     ALIGNMENT:  There are 5 lumbar type vertebral bodies  Normal alignment of the lumbar spine  No spondylolysis or spondylolisthesis      VERTEBRAL BODIES:  No fracture  No lytic or blastic lesion      DEGENERATIVE CHANGES:     Lower Thoracic spine:  Normal lower thoracic disc spaces      L1-2:  Normal disc height  No herniation  Normal facet joints    No canal or foraminal stenosis      L2-3:  Appears unremarkable with no significant central canal narrowing of foraminal narrowing     L3-4:  Mild facet joint disease seen with disc bulge with no significant central canal narrowing of foraminal narrowing     L4-5:  Demonstrates left foraminal protrusion with moderate to severe left foraminal narrowing  This abuts the exiting left L4 nerve root  There is no significant central canal narrowing  There is facet joint disease     L5-S1:  Disc bulge seen with facet joint disease with no significant central canal narrowing or foraminal narrowing     PARASPINAL SOFT TISSUES:   Normal   Nonobstructing calculus seen right kidney with single 2 mm  IMPRESSION:     Left foraminal protrusion at L4-5 with moderate to severe left foraminal narrowing and abutment / mild impingement of the exiting left L4 nerve root    Facet joint disease at L4-5 and L5-S1  No acute compression collapse of the vertebra     FL spine and pain procedure    (Results Pending)       Orders Placed This Encounter   Procedures   • FL spine and pain procedure

## 2022-11-21 ENCOUNTER — CONSULT (OUTPATIENT)
Dept: PAIN MEDICINE | Facility: CLINIC | Age: 62
End: 2022-11-21

## 2022-11-21 VITALS
HEART RATE: 91 BPM | DIASTOLIC BLOOD PRESSURE: 106 MMHG | BODY MASS INDEX: 25.54 KG/M2 | WEIGHT: 178 LBS | SYSTOLIC BLOOD PRESSURE: 179 MMHG

## 2022-11-21 DIAGNOSIS — M47.816 LUMBAR FACET ARTHROPATHY: ICD-10-CM

## 2022-11-21 DIAGNOSIS — R29.898 WEAKNESS OF LEFT FOOT: ICD-10-CM

## 2022-11-21 DIAGNOSIS — M54.16 LUMBAR RADICULOPATHY: Primary | ICD-10-CM

## 2022-11-21 RX ORDER — GABAPENTIN 300 MG/1
300 CAPSULE ORAL 3 TIMES DAILY
Qty: 90 CAPSULE | Refills: 0 | Status: CANCELLED | OUTPATIENT
Start: 2022-11-21

## 2022-11-21 NOTE — PATIENT INSTRUCTIONS
Epidural Steroid Injection   AMBULATORY CARE:   What you need to know about an epidural steroid injection (MYRANDA):  An MYRANDA is a procedure to inject steroid medicine into the epidural space  The epidural space is between your spinal cord and vertebrae  Steroids reduce inflammation and fluid buildup in your spine that may be causing pain  You may be given pain medicine along with the steroids  How to prepare for an MYRANDA:  Your healthcare provider will talk to you about how to prepare for your procedure  He or she will tell you what medicines to take or not take on the day of your procedure  You may need to stop taking blood thinners or other medicines several days before your procedure  You may need to adjust any diabetes medicine you take on the day of your procedure  Steroid medicine can increase your blood sugar level  Arrange for someone to drive you home when you are discharged  What will happen during an MYRANDA:   You will be given medicine to numb the procedure area  You will be awake for the procedure, but you will not feel pain  You may also be given medicine to help you relax  Contrast liquid will be used to help your healthcare provider see the area better  Tell the healthcare provider if you have ever had an allergic reaction to contrast liquid  Your healthcare provider may place the needle into your neck area, middle of your back, or tailbone area  He may inject the medicine next to the nerves that are causing your pain  He may instead inject the medicine into a larger area of the epidural space  This helps the medicine spread to more nerves  Your healthcare provider will use a fluoroscope to help guide the needle to the right place  A fluoroscope is a type of x-ray  After the procedure, a bandage will be placed over the injection site to prevent infection  What will happen after an MYRANDA:  You will have a bandage over the injection site to prevent infection   Your healthcare provider will tell you when you can bathe and any activity guidelines  You will be able to go home  Risks of an MYRANDA:  You may have temporary or permanent nerve damage or paralysis  You may have bleeding or develop a serious infection, such as meningitis (swelling of the brain coverings)  An abscess may also develop  An abscess is a pus-filled area under the skin  You may need surgery to fix the abscess  You may have a seizure, anxiety, or trouble sleeping  If you are a man, you may have temporary erectile dysfunction (not able to have an erection)  Call your local emergency number (911 in the [address] [place] [address],[address]) if:   You have a seizure  You have trouble moving your legs  Seek care immediately if:   Blood soaks through your bandage  You have a fever or chills, severe back pain, and the procedure area is sensitive to the touch  You cannot control when you urinate or have a bowel movement  Call your doctor if:   You have weakness or numbness in your legs  Your wound is red, swollen, or draining pus  You have nausea or are vomiting  Your face or neck is red and you feel warm  You have more pain than you had before the procedure  You have swelling in your hands or feet  You have questions or concerns about your condition or care  Care for your wound as directed: You may remove the bandage before you go to bed the day of your procedure  You may take a shower, but do not take a bath for at least 24 hours  Self-care:   Do not drive,  use machines, or do strenuous activity for 24 hours after your procedure or as directed  Continue other treatments  as directed  Steroid injections alone will not control your pain  The injections are meant to be used with other treatments, such as physical therapy  Follow up with your doctor as directed:  Write down your questions so you remember to ask them during your visits     © Copyright [employer] 2022 Information is for End User's use only and may not be sold, redistributed or otherwise used for commercial purposes  All illustrations and images included in CareNotes® are the copyrighted property of A D A M [employer] Inc  or Sofie Bryson  The above information is an  only  It is not intended as medical advice for individual conditions or treatments  Talk to your doctor, nurse or pharmacist before following any medical regimen to see if it is safe and effective for you

## 2022-11-22 ENCOUNTER — TELEPHONE (OUTPATIENT)
Dept: PAIN MEDICINE | Facility: CLINIC | Age: 62
End: 2022-11-22

## 2022-11-22 DIAGNOSIS — M54.16 LUMBAR RADICULOPATHY: Primary | ICD-10-CM

## 2022-11-22 RX ORDER — GABAPENTIN 300 MG/1
300 CAPSULE ORAL 3 TIMES DAILY
Qty: 90 CAPSULE | Refills: 0 | Status: SHIPPED | OUTPATIENT
Start: 2022-11-22 | End: 2022-12-22 | Stop reason: SDUPTHER

## 2022-11-22 NOTE — TELEPHONE ENCOUNTER
Caller: Pt    Doctor: Dr Roxy Menjivar    Reason for call: Pain medication that was dicussed until the procedure     Call back#: [phone]

## 2022-11-22 NOTE — TELEPHONE ENCOUNTER
Caller: patient     Doctor: Brian Courtney     Reason for call: pt checking the status on previous message about medication      Call back#: [phone]

## 2022-11-29 ENCOUNTER — TELEPHONE (OUTPATIENT)
Dept: PAIN MEDICINE | Facility: CLINIC | Age: 62
End: 2022-11-29

## 2022-11-29 NOTE — TELEPHONE ENCOUNTER
Caller: cande    Doctor: Margie Akins    Reason for call: just received text we can offer sooner appt he was on a waiting list      Call back#: [phone]

## 2022-12-06 ENCOUNTER — HOSPITAL ENCOUNTER (OUTPATIENT)
Dept: RADIOLOGY | Facility: CLINIC | Age: 62
Discharge: HOME | End: 2022-12-06

## 2022-12-06 VITALS
TEMPERATURE: 97.1 F | HEART RATE: 93 BPM | DIASTOLIC BLOOD PRESSURE: 104 MMHG | RESPIRATION RATE: 20 BRPM | OXYGEN SATURATION: 97 % | SYSTOLIC BLOOD PRESSURE: 163 MMHG

## 2022-12-06 DIAGNOSIS — M54.16 LUMBAR RADICULOPATHY: ICD-10-CM

## 2022-12-06 RX ORDER — METHYLPREDNISOLONE ACETATE 80 MG/ML
80 INJECTION, SUSPENSION INTRA-ARTICULAR; INTRALESIONAL; INTRAMUSCULAR; PARENTERAL; SOFT TISSUE ONCE
Status: COMPLETED | OUTPATIENT
Start: 2022-12-06 | End: 2022-12-06

## 2022-12-06 RX ORDER — BUPIVACAINE HCL/PF 2.5 MG/ML
2 VIAL (ML) INJECTION ONCE
Status: COMPLETED | OUTPATIENT
Start: 2022-12-06 | End: 2022-12-06

## 2022-12-06 RX ADMIN — Medication 2 ML: at 15:38

## 2022-12-06 RX ADMIN — METHYLPREDNISOLONE ACETATE 80 MG: 80 INJECTION, SUSPENSION INTRA-ARTICULAR; INTRALESIONAL; INTRAMUSCULAR; PARENTERAL; SOFT TISSUE at 15:38

## 2022-12-06 RX ADMIN — IOHEXOL 1 ML: 300 INJECTION, SOLUTION INTRAVENOUS at 15:38

## 2022-12-06 NOTE — INTERVAL H&P NOTE
Update: (This section must be completed if the H&P was completed greater than 24 hrs to procedure or admission)    H&P reviewed  After examining the patient, I find no changed to the H&P since it had been written  Patient re-evaluated   Accept as history and physical     Luca Lucio MD/December 6, 2022/3:27 PM

## 2022-12-06 NOTE — DISCHARGE INSTR - LAB
Epidural Steroid Injection   WHAT YOU NEED TO KNOW:   An epidural steroid injection (MYRANDA) is a procedure to inject steroid medicine into the epidural space  The epidural space is between your spinal cord and vertebrae  Steroids reduce inflammation and fluid buildup in your spine that may be causing pain  You may be given pain medicine along with the steroids  ACTIVITY  Do not drive or operate machinery today  No strenuous activity today - bending, lifting, etc   You may resume normal activites starting tomorrow - start slowly and as tolerated  You may shower today, but no tub baths or hot tubs  You may have numbness for several hours from the local anesthetic  Please use caution and common sense, especially with weight-bearing activities  CARE OF THE INJECTION SITE  If you have soreness or pain, apply ice to the area today (20 minutes on/20 minutes off)  Starting tomorrow, you may use warm, moist heat or ice if needed  You may have an increase or change in your discomfort for 36-48 hours after your treatment  Apply ice and continue with any pain medication you have been prescribed  Notify the [facility] if you have any of the following: redness, drainage, swelling, headache, stiff neck or fever above 100°F     SPECIAL INSTRUCTIONS  Our office will contact you in approximately 7 days for a progress report  MEDICATIONS  Continue to take all routine medications  Our office may have instructed you to hold some medications  As no general anesthesia was used in today's procedure, you should not experience any side effects related to anesthesia  If you are diabetic, the steroids used in today's injection may temporarily increase your blood sugar levels after the first few days after your injection  Please keep a close eye on your sugars and alert the doctor who manages your diabetes if your sugars are significantly high from your baseline or you are symptomatic       If you have a problem specifically related to your procedure, please call our office at [phone]  Problems not related to your procedure should be directed to your primary care physician

## 2022-12-14 ENCOUNTER — TELEPHONE (OUTPATIENT)
Dept: FAMILY MEDICINE CLINIC | Facility: CLINIC | Age: 62
End: 2022-12-14

## 2022-12-14 NOTE — TELEPHONE ENCOUNTER
Patient called to let you know his blood sugars have been in the 170's  He wanted to know if you want to adjust his medications?

## 2022-12-14 NOTE — TELEPHONE ENCOUNTER
At this time, no   I just saw he received steroid injections into his back last week, so that will effect his blood sugars yes...

## 2022-12-22 ENCOUNTER — TELEPHONE (OUTPATIENT)
Dept: PAIN MEDICINE | Facility: CLINIC | Age: 62
End: 2022-12-22

## 2022-12-22 DIAGNOSIS — M54.16 LUMBAR RADICULOPATHY: ICD-10-CM

## 2022-12-22 RX ORDER — GABAPENTIN 300 MG/1
300 CAPSULE ORAL 3 TIMES DAILY
Qty: 90 CAPSULE | Refills: 1 | Status: SHIPPED | OUTPATIENT
Start: 2022-12-22 | End: 2023-01-20 | Stop reason: SDUPTHER

## 2022-12-22 NOTE — TELEPHONE ENCOUNTER
Review below    S/W pt who states he is still feeling relief from the TFESI from 12/6 and doesn't think it needs repeating just yet  States having more issues with the neuropathy/numbness  States he is getting with PCP regarding continuing High BS and BP    Taking Gabapentin 300mg TID and is not having any side effects  Thinks it may be helping  Would like to stay on 300mg TID for now  Pt will CB in one week with update and discuss possible changes to meds or repeat inj at that time    Can Gabapentin refill be sent to [facility] on file?

## 2022-12-22 NOTE — TELEPHONE ENCOUNTER
Caller: Whitney Vargas (pt's wife)    Doctor: Sean Mckee for call: request for repeat injection for right side lumbar radiculopathy    Call back#: [phone] (pt's number)

## 2022-12-29 ENCOUNTER — OFFICE VISIT (OUTPATIENT)
Dept: FAMILY MEDICINE CLINIC | Facility: CLINIC | Age: 62
End: 2022-12-29

## 2022-12-29 VITALS
DIASTOLIC BLOOD PRESSURE: 92 MMHG | TEMPERATURE: 96.7 F | WEIGHT: 173 LBS | OXYGEN SATURATION: 98 % | SYSTOLIC BLOOD PRESSURE: 162 MMHG | HEIGHT: 69 IN | HEART RATE: 103 BPM | BODY MASS INDEX: 25.62 KG/M2

## 2022-12-29 DIAGNOSIS — I10 PRIMARY HYPERTENSION: Primary | ICD-10-CM

## 2022-12-29 DIAGNOSIS — E11.9 TYPE 2 DIABETES MELLITUS WITHOUT COMPLICATION, WITHOUT LONG-TERM CURRENT USE OF INSULIN (HCC): ICD-10-CM

## 2022-12-29 RX ORDER — LISINOPRIL 10 MG/1
10 TABLET ORAL DAILY
Qty: 30 TABLET | Refills: 0 | Status: SHIPPED | OUTPATIENT
Start: 2022-12-29

## 2022-12-29 NOTE — PATIENT INSTRUCTIONS
Hypertension   AMBULATORY CARE:   Hypertension  is high blood pressure  Your blood pressure is the force of your blood moving against the walls of your arteries  Hypertension causes your blood pressure to get so high that your heart has to work much harder than normal  This can damage your heart  The cause of hypertension may not be known  This is called essential or primary hypertension  Hypertension caused by another medical condition, such as kidney disease, is called secondary hypertension  Common symptoms include the following:   Headache    Blurred vision    Chest pain    Dizziness or weakness    Trouble breathing    Nosebleeds    Call your local emergency number (911 in the [address],[address]) or have someone call if:   You have chest pain  You have any of the following signs of a heart attack:      Squeezing, pressure, or pain in your chest    You may  also have any of the following:     Discomfort or pain in your back, neck, jaw, stomach, or arm    Shortness of breath    Nausea or vomiting    Lightheadedness or a sudden cold sweat    You become confused or have trouble speaking  You suddenly feel lightheaded or have trouble breathing  Seek care immediately if:   You have a severe headache or vision loss  You have weakness in an arm or leg  Call your doctor or cardiologist if:   You feel faint, dizzy, confused, or drowsy  You have been taking your blood pressure medicine but your pressure is higher than your provider says it should be  You have questions or concerns about your condition or care  What you need to know about the stages of hypertension:       Normal blood pressure is 119/79 or lower   Your healthcare provider may only check your blood pressure each year if it stays at a normal level  Elevated blood pressure is 120/79 to 129/79   This is sometimes called prehypertension  Your healthcare provider may suggest lifestyle changes to help lower your blood pressure to a normal level   He or she may then check it again in 3 to 6 months  Stage 1 hypertension is 130/80  to 139/89   Your provider may recommend lifestyle changes, medication, and checks every 3 to 6 months until your blood pressure is controlled  Stage 2 hypertension is 140/90 or higher   Your provider will recommend lifestyle changes and have you take 2 kinds of hypertension medicines  You will also need to have your blood pressure checked monthly until it is controlled  Treatment for hypertension  may include medicine to lower your blood pressure and lower your cholesterol level  A low cholesterol level helps prevent heart disease and makes it easier to control your blood pressure  Take your medicine exactly as directed  You may also need to make lifestyle changes  Manage hypertension:   Check your blood pressure at home  Avoid smoking, caffeine, and exercise at least 30 minutes before checking your blood pressure  Sit and rest for 5 minutes before you take your blood pressure  Extend your arm and support it on a flat surface  Your arm should be at the same level as your heart  Follow the directions that came with your blood pressure monitor  Check your blood pressure 2 times, 1 minute apart, before you take your medicine in the morning  Also check your blood pressure before your evening meal  Keep a record of your readings and bring it to your follow-up visits  Ask your healthcare provider what your blood pressure should be  Manage any other health conditions you have  Health conditions such as diabetes can increase your risk for hypertension  Follow your healthcare provider's instructions and take all your medicines as directed  Ask about all medicines  Certain medicines can increase your blood pressure  Examples include oral birth control pills, decongestants, herbal supplements, and NSAIDs, such as ibuprofen  Your healthcare provider can tell you which medicines are safe for you to take   This includes prescription and over-the-counter medicines  Lifestyle changes you can make to manage hypertension:  Your healthcare provider may recommend you work with a team to manage hypertension  The team may include medical experts such as a dietitian, an exercise or physical therapist, and a behavior therapist  Your family members may be included in helping you create lifestyle changes  Limit sodium (salt) as directed  Too much sodium can affect your fluid balance  Check labels to find low-sodium or no-salt-added foods  Some low-sodium foods use potassium salts for flavor  Too much potassium can also cause health problems  Your healthcare provider will tell you how much sodium and potassium are safe for you to have in a day  He or she may recommend that you limit sodium to 2,300 mg a day  Follow the meal plan recommended by your healthcare provider  A dietitian or your provider can give you more information on low-sodium plans or the DASH (Dietary Approaches to Stop Hypertension) eating plan  The DASH plan is low in sodium, processed sugar, unhealthy fats, and total fat  It is high in potassium, calcium, and fiber  These can be found in vegetables, fruit, and whole-grain foods  Be physically active throughout the day  Physical activity, such as exercise, can help control your blood pressure and your weight  Be physically active for at least 30 minutes per day, on most days of the week  Include aerobic activity, such as walking or riding a bicycle  Also include strength training at least 2 times each week  Your healthcare providers can help you create a physical activity plan  Decrease stress  This may help lower your blood pressure  Learn ways to relax, such as deep breathing or listening to music  Limit alcohol as directed  Alcohol can increase your blood pressure  A drink of alcohol is 12 ounces of beer, 5 ounces of wine, or 1½ ounces of liquor  Do not smoke    Nicotine and other chemicals in cigarettes and cigars can increase your blood pressure and also cause lung damage  Ask your healthcare provider for information if you currently smoke and need help to quit  E-cigarettes or smokeless tobacco still contain nicotine  Talk to your healthcare provider before you use these products  Follow up with your doctor or cardiologist as directed: You will need to return to have your blood pressure checked and to have other lab tests done  Write down your questions so you remember to ask them during your visits  © Copyright [employer] 2022 Information is for End User's use only and may not be sold, redistributed or otherwise used for commercial purposes  All illustrations and images included in CareNotes® are the copyrighted property of [employer] M [employer] Inc  or [facility] Isidra Boothe   The above information is an  only  It is not intended as medical advice for individual conditions or treatments  Talk to your doctor, nurse or pharmacist before following any medical regimen to see if it is safe and effective for you

## 2022-12-29 NOTE — ASSESSMENT & PLAN NOTE
Blood pressure continues to be elevated  Will initiate lisinopril 10 mg daily  Educated patient on importance of adhering to heart healthy diet, weight management, and increasing physical activity  Educated patient on how and when to take blood pressure, to record findings and bring to next visit  Educated patient on signs and symptoms of stroke and when to seek immediate care  Will reevaluate in 4 weeks, or sooner if needed

## 2022-12-29 NOTE — PROGRESS NOTES
Name: Melissa Ramirez      : 1960      MRN: [id number]  Encounter Provider: YESENIA Franco  Encounter Date: 2022   Encounter department: [facility] [address] [place] [address]  Primary hypertension  Assessment & Plan:  Blood pressure continues to be elevated  Will initiate lisinopril 10 mg daily  Educated patient on importance of adhering to heart healthy diet, weight management, and increasing physical activity  Educated patient on how and when to take blood pressure, to record findings and bring to next visit  Educated patient on signs and symptoms of stroke and when to seek immediate care  Will reevaluate in 4 weeks, or sooner if needed  Orders:  -     lisinopril (ZESTRIL) 10 mg tablet; Take 1 tablet (10 mg total) by mouth daily    2  Type 2 diabetes mellitus without complication, without long-term current use of insulin ([facility] )  Assessment & Plan:  Patient taking glimepiride 2 mg daily without difficulty  Denies adverse effects  Educated patient on blood sugar parameters:   Premeal , even better <110   2hr after a meal <170, even better <140   A1C <7%, even better <6 5%  To continue on current medication and will recheck A1c at 3-month suzette, which will be next month  Lab Results   Component Value Date    HGBA1C 11 1 (H) 10/10/2022              Subjective      Hypertension  This is a new problem  The current episode started 1 to 4 weeks ago  The problem is unchanged  The problem is uncontrolled  Pertinent negatives include no anxiety, blurred vision, chest pain, headaches, malaise/fatigue, neck pain, orthopnea, palpitations, peripheral edema, PND, shortness of breath or sweats  There are no associated agents to hypertension  Risk factors for coronary artery disease include male gender, diabetes mellitus and dyslipidemia  Past treatments include nothing  There are no compliance problems    There is no history of angina, kidney disease, CAD/MI, CVA, heart failure, left ventricular hypertrophy, PVD or retinopathy  There is no history of chronic renal disease, coarctation of the aorta, hyperaldosteronism, hypercortisolism, hyperparathyroidism, a hypertension causing med, pheochromocytoma, renovascular disease, sleep apnea or a thyroid problem  Review of Systems   Constitutional: Negative for activity change, appetite change, fatigue and malaise/fatigue  HENT: Negative for congestion, sore throat and trouble swallowing  Eyes: Negative for blurred vision, photophobia and visual disturbance  Respiratory: Negative for chest tightness and shortness of breath  Cardiovascular: Negative for chest pain, palpitations, orthopnea and PND  Gastrointestinal: Negative for abdominal pain, constipation, nausea and vomiting  Genitourinary: Negative for decreased urine volume  Musculoskeletal: Negative for arthralgias, myalgias and neck pain  Skin: Negative for color change and rash  Neurological: Negative for dizziness, seizures, syncope, facial asymmetry, weakness, light-headedness, numbness and headaches  Psychiatric/Behavioral: Negative for confusion  Current Outpatient Medications on File Prior to Visit   Medication Sig   • atorvastatin (LIPITOR) 20 mg tablet Take 1 tablet (20 mg total) by mouth every evening   • Blood Glucose Monitoring Suppl (OneTouch Verio Reflect) w/Device KIT Check blood sugars once daily  Please substitute with appropriate alternative as covered by patient's insurance  Dx: E11 65   • gabapentin (NEURONTIN) 300 mg capsule Take 1 capsule (300 mg total) by mouth 3 (three) times a day   • glimepiride (AMARYL) 2 mg tablet Take 1 tablet (2 mg total) by mouth daily with breakfast   • glucose blood (OneTouch Verio) test strip Check blood sugars once daily  Please substitute with appropriate alternative as covered by patient's insurance   Dx: W33 78   • OneTouch Delica Lancets 66X MISC Check blood sugars once daily  Please substitute with appropriate alternative as covered by patient's insurance  Dx: E11 65   • [DISCONTINUED] diclofenac (VOLTAREN) 75 mg EC tablet Take 1 tablet (75 mg total) by mouth 2 (two) times a day   • [DISCONTINUED] HYDROcodone-acetaminophen (NORCO) 5-325 mg per tablet Take 1 tablet by mouth every 6 (six) hours as needed for pain Max Daily Amount: 4 tablets   • [DISCONTINUED] ibuprofen (MOTRIN) 200 mg tablet Take 800 mg by mouth every 6 (six) hours as needed for mild pain   • [DISCONTINUED] lidocaine (LIDODERM) 5 % Apply 1 patch topically daily Remove & Discard patch within 12 hours or as directed by MD   • [DISCONTINUED] TiZANidine (ZANAFLEX) 4 MG capsule Take 1 capsule (4 mg total) by mouth 3 (three) times a day as needed for muscle spasms       Objective     /92 (BP Location: Left arm, Patient Position: Sitting, Cuff Size: Standard)   Pulse 103   Temp (!) 96 7 °F (35 9 °C) (Tympanic)   Ht 5' 9" (1 753 m)   Wt 78 5 kg (173 lb)   SpO2 98%   BMI 25 55 kg/m²     Physical Exam  Vitals reviewed  Constitutional:       General: He is not in acute distress  Appearance: Normal appearance  He is not ill-appearing  HENT:      Head: Normocephalic and atraumatic  Right Ear: Tympanic membrane, ear canal and external ear normal       Left Ear: Tympanic membrane, ear canal and external ear normal       Nose: Nose normal       Mouth/Throat:      Mouth: Mucous membranes are moist       Pharynx: Oropharynx is clear  Eyes:      General: No visual field deficit  Conjunctiva/sclera: Conjunctivae normal       Pupils: Pupils are equal, round, and reactive to light  Neck:      Vascular: No carotid bruit  Cardiovascular:      Rate and Rhythm: Normal rate and regular rhythm  Pulses: Normal pulses  Heart sounds: Normal heart sounds  No murmur heard  Pulmonary:      Effort: Pulmonary effort is normal       Breath sounds: Normal breath sounds     Abdominal:      General: Bowel sounds are normal       Palpations: Abdomen is soft  Tenderness: There is no abdominal tenderness  There is no right CVA tenderness or left CVA tenderness  Musculoskeletal:         General: Normal range of motion  Cervical back: Normal range of motion and neck supple  Right lower leg: No edema  Left lower leg: No edema  Skin:     General: Skin is warm and dry  Capillary Refill: Capillary refill takes less than 2 seconds  Neurological:      General: No focal deficit present  Mental Status: He is alert and oriented to person, place, and time  GCS: GCS eye subscore is 4  GCS verbal subscore is 5  GCS motor subscore is 6  Cranial Nerves: Cranial nerves 2-12 are intact  No facial asymmetry  Sensory: Sensation is intact  Motor: Motor function is intact  Gait: Gait is intact     Psychiatric:         Mood and Affect: Mood normal          Behavior: Behavior normal        YESENIA Hernández

## 2022-12-29 NOTE — ASSESSMENT & PLAN NOTE
Patient taking glimepiride 2 mg daily without difficulty  Denies adverse effects  Educated patient on blood sugar parameters:   Premeal , even better <110   2hr after a meal <170, even better <140   A1C <7%, even better <6 5%  To continue on current medication and will recheck A1c at 3-month suzette, which will be next month        Lab Results   Component Value Date    HGBA1C 11 1 (H) 10/10/2022

## 2023-01-04 ENCOUNTER — TELEPHONE (OUTPATIENT)
Dept: OBGYN CLINIC | Facility: HOSPITAL | Age: 63
End: 2023-01-04

## 2023-01-04 NOTE — TELEPHONE ENCOUNTER
Caller: Venancio Fuentes    Doctor/Office:      Call regarding :  appt     Call was transferred to: podiatry

## 2023-01-06 ENCOUNTER — TELEPHONE (OUTPATIENT)
Dept: PAIN MEDICINE | Facility: CLINIC | Age: 63
End: 2023-01-06

## 2023-01-06 NOTE — TELEPHONE ENCOUNTER
If more so weakness than pain, then injection will not help   May need to order MRI and send to Dr Armando Garcia

## 2023-01-06 NOTE — TELEPHONE ENCOUNTER
S/w pt, informed same  Pt says he don't really want to have surgery yet  MRI he is okay  Pt says he has the pain on his right lower back 3-4/10 depending on how he sits  He takes gabapentin  Advised to take tylenol, advil, use ice/heat if pain gets worst while awaiting for response from [facility]  He has been trying to go to PT but it get cancelled due to the therapist being sick, he understands that therapy will help his weakness  Pt says the foot drop is improving and he will see the podiatrist next Thursday

## 2023-01-06 NOTE — TELEPHONE ENCOUNTER
Caller: pt  Doctor: Priyanka Akbar    Reason for call:pt stating his pain level is a 3-4/10 it is irregular  Lower back and strength issues in both legs  Right side of front thigh muscle is sore and tender  If relevant for another injection please let pt know  Pt is going to a podiatrist because of left leg drop foot  (per the physical therapist ) Main concern is his lower back discomfort       Call back#: [phone]

## 2023-01-09 NOTE — TELEPHONE ENCOUNTER
Since he is starting PT, I would do that first and follow up after 6w for reassessment with MG  At that time can decide if MRI is necesssary

## 2023-01-09 NOTE — TELEPHONE ENCOUNTER
Left detailed message per odette for pt to cb to schedule f/u ov in 4 weeks with MG    Advised can discuss MRI at that time and response to PT

## 2023-01-12 ENCOUNTER — OFFICE VISIT (OUTPATIENT)
Dept: PODIATRY | Facility: CLINIC | Age: 63
End: 2023-01-12

## 2023-01-12 ENCOUNTER — TELEPHONE (OUTPATIENT)
Dept: NEUROLOGY | Facility: CLINIC | Age: 63
End: 2023-01-12

## 2023-01-12 VITALS
OXYGEN SATURATION: 99 % | BODY MASS INDEX: 26.13 KG/M2 | HEIGHT: 69 IN | DIASTOLIC BLOOD PRESSURE: 106 MMHG | SYSTOLIC BLOOD PRESSURE: 176 MMHG | HEART RATE: 119 BPM | WEIGHT: 176.4 LBS

## 2023-01-12 DIAGNOSIS — M21.372 LEFT FOOT DROP: Primary | ICD-10-CM

## 2023-01-12 DIAGNOSIS — G62.9 PERIPHERAL POLYNEUROPATHY: ICD-10-CM

## 2023-01-12 DIAGNOSIS — M54.41 CHRONIC MIDLINE LOW BACK PAIN WITH BILATERAL SCIATICA: ICD-10-CM

## 2023-01-12 DIAGNOSIS — G89.29 CHRONIC MIDLINE LOW BACK PAIN WITH BILATERAL SCIATICA: ICD-10-CM

## 2023-01-12 DIAGNOSIS — M54.42 CHRONIC MIDLINE LOW BACK PAIN WITH BILATERAL SCIATICA: ICD-10-CM

## 2023-01-12 NOTE — PROGRESS NOTES
Assessment/Plan:    The patient's clinical examination is consistent with a left lower extremity dropfoot deformity  The patient would like to start therapy in hopes of proving upon strength and range of motion of the left lower extremity and wants to avoid bracing if possible  I think this is a good option and I referred him to physical therapy for his dropfoot as well as his chronic low back pain  He is also following up with Dr Cele Morton for his spine issues  I have also ordered an EMG/NCV study of his bilateral lower extremities to further assess his left dropfoot deformity and to evaluate for peripheral neuropathy stemming from his diabetes  Recommend follow-up in 6 to 8 weeks to ascertain efficacy of physical therapy  Hopefully his EMG NCV study has been completed and we can review that as well  If the patient fails to improve or becomes more for fall or tripping risk, we may need to consider an AFO for management of his dropfoot deformity  Diagnoses and all orders for this visit:    Left foot drop  -     EMG 2 limb lower extremity; Future  -     Ambulatory referral to Physical Therapy; Future    Peripheral polyneuropathy  -     EMG 2 limb lower extremity; Future    Chronic midline low back pain with bilateral sciatica  -     Ambulatory referral to Physical Therapy; Future          Subjective:      Patient ID: Velvet Ford is a 58 y o  male  The patient presents today for his initial consultation with [facility]'s podiatry group with a chief complaint of a left dropfoot deformity  He is uncertain of its etiology but attributes it to his chronic low back pain and radiculopathy  He does note a longstanding history of diabetes which had up until recently been poorly controlled  He feels at least some of his symptoms can be stemming from diabetic peripheral neuropathy as well  He does note some tingling sensations to his bilateral lower extremities, left worse than the right    He states that he has been trying to do some home therapy to improve range of motion of his left lower extremity and feels that has restored some strength and motion to his left foot and ankle  The following portions of the patient's history were reviewed and updated as appropriate: allergies, current medications, past family history, past medical history, past social history, past surgical history and problem list       PAST MEDICAL HISTORY:  Past Medical History:   Diagnosis Date   • Diabetes mellitus ([facility] )    • Hyperlipidemia    • Sciatica    • Type 2 diabetes mellitus ([facility] )        PAST SURGICAL HISTORY:  History reviewed  No pertinent surgical history  ALLERGIES:  Patient has no known allergies  MEDICATIONS:  Current Outpatient Medications   Medication Sig Dispense Refill   • atorvastatin (LIPITOR) 20 mg tablet Take 1 tablet (20 mg total) by mouth every evening 90 tablet 1   • Blood Glucose Monitoring Suppl (OneTouch Verio Reflect) w/Device KIT Check blood sugars once daily  Please substitute with appropriate alternative as covered by patient's insurance  Dx: E11 65 1 kit 0   • gabapentin (NEURONTIN) 300 mg capsule Take 1 capsule (300 mg total) by mouth 3 (three) times a day 90 capsule 1   • glimepiride (AMARYL) 2 mg tablet Take 1 tablet (2 mg total) by mouth daily with breakfast 90 tablet 0   • glucose blood (OneTouch Verio) test strip Check blood sugars once daily  Please substitute with appropriate alternative as covered by patient's insurance  Dx: E11 65 100 each 3   • lisinopril (ZESTRIL) 10 mg tablet Take 1 tablet (10 mg total) by mouth daily (Patient not taking: Reported on 1/12/2023) 30 tablet 0   • OneTouch Delica Lancets 88J MISC Check blood sugars once daily  Please substitute with appropriate alternative as covered by patient's insurance  Dx: E11 65 (Patient not taking: Reported on 1/12/2023) 100 each 3     No current facility-administered medications for this visit         SOCIAL HISTORY:  Social History Socioeconomic History   • Marital status: /Civil Union     Spouse name: None   • Number of children: None   • Years of education: None   • Highest education level: None   Occupational History   • None   Tobacco Use   • Smoking status: Some Days     Types: Cigars   • Smokeless tobacco: Never   • Tobacco comments:     Occasionally   Vaping Use   • Vaping Use: Never used   Substance and Sexual Activity   • Alcohol use: Yes   • Drug use: Not Currently   • Sexual activity: None   Other Topics Concern   • None   Social History Narrative   • None     Social Determinants of Health     Financial Resource Strain: Not on file   Food Insecurity: Not on file   Transportation Needs: Not on file   Physical Activity: Not on file   Stress: Not on file   Social Connections: Not on file   Intimate Partner Violence: Not on file   Housing Stability: Not on file        Review of Systems   Constitutional: Negative  HENT: Negative  Eyes: Negative  Respiratory: Negative  Cardiovascular: Negative  Endocrine: Negative  Musculoskeletal: Positive for back pain and gait problem  Skin: Negative  Hematological: Negative  Psychiatric/Behavioral: Negative  Objective:      BP (!) 176/106 (BP Location: Left arm, Patient Position: Sitting, Cuff Size: Standard)   Pulse (!) 119   Ht 5' 9" (1 753 m)   Wt 80 kg (176 lb 6 4 oz)   SpO2 99%   BMI 26 05 kg/m²          Physical Exam  Vitals reviewed  Constitutional:       Appearance: Normal appearance  HENT:      Head: Normocephalic and atraumatic  Nose: Nose normal    Eyes:      Conjunctiva/sclera: Conjunctivae normal       Pupils: Pupils are equal, round, and reactive to light  Cardiovascular:      Pulses:           Dorsalis pedis pulses are 2+ on the right side and 2+ on the left side  Posterior tibial pulses are 1+ on the right side and 1+ on the left side     Pulmonary:      Effort: Pulmonary effort is normal    Musculoskeletal:      Left foot: Foot drop present  Feet:      Left foot:      Skin integrity: Skin integrity normal       Comments: There is a complete lack of active dorsiflexion to the patient's left ankle; plantarflexion  Range of motion is within normal limits muscle power with plantar flexion is 5 out of 5, there is 0 out of 5 with dorsiflexion; range of motion and muscle power to the right lower extremity ankle joint is within normal limits; there is no erythema no ecchymosis no active edema nor calor to the left lower extremity; there are no open lesions nor atrophic changes; the patient does note paresthesias to the lateral aspect of the left lower leg; mild equinus is also noted to the left ankle with passive dorsiflexion of the joint  Skin:     General: Skin is warm  Capillary Refill: Capillary refill takes less than 2 seconds  Neurological:      General: No focal deficit present  Mental Status: He is alert and oriented to person, place, and time  Psychiatric:         Mood and Affect: Mood normal          Behavior: Behavior normal          Thought Content:  Thought content normal

## 2023-01-13 ENCOUNTER — PROCEDURE VISIT (OUTPATIENT)
Dept: NEUROLOGY | Facility: CLINIC | Age: 63
End: 2023-01-13

## 2023-01-13 DIAGNOSIS — G62.9 PERIPHERAL POLYNEUROPATHY: ICD-10-CM

## 2023-01-13 DIAGNOSIS — M21.372 LEFT FOOT DROP: ICD-10-CM

## 2023-01-13 NOTE — PROGRESS NOTES
Procedures      EMG BILATERAL LOWER EXTREMITY    Patient reports weakness with dorsiflexion of the left foot began a few months ago  He also reports symptoms of numbness and tingling in the lateral foreleg and top of his foot  He reports that with therapy he is seeing some improvement in strength  Brief exam demonstrates significant weakness with left dorsiflexion eversion and toe extension  There is an absent right knee jerk with trace left knee jerk and absent ankle jerks bilaterally    Motor and sensory conduction studies were performed on the bilateral peroneal, tibial and sural nerves  The distal motor latencies were normal  The left peroneal motor action potential amplitude is significantly reduced while the other motor action potential amplitudes were normal  Conduction velocities of the peroneal nerve below the fibular head was slow on the right at 37 m/s with normal conduction across the fibular head and the conduction on the left was slow at 29 m/s with slowing across the fibular head at 29 m/s  The tibial conduction velocities were slow bilaterally at 36 m/s on the right and 29 m/s on the left       Bilateral peroneal and tibial F waves were prolonged  Bilateral sural distal sensory latencies were normal with mildly reduced action potential amplitude on the left with normal potential on the right    H  reflexes were symmetrically prolonged  Concentric needle EMG was performed in various distal and proximal muscles of the lower extremities bilaterally including EDB, tibialis anterior, gastrocnemius medius, vastus lateralis, biceps femoralis short head and the low lumbar paraspinal regions  There was positive waves and fibrillation potentials noted in the left tibialis anterior, peroneus longus and EDB regions  No other spontaneous activities were seen    Rapid firing large amplitude potentials with reduced interference patterns were noted tibialis anterior and peroneus longus with a discrete interference pattern noted to EDB  The other compound motor unit potentials were of normal configuration and interference patterns were full or full for effort  IMPRESSION: This is an abnormal EMG of the bilateral lower extremities due to changes consistent with a length dependent mixed motor sensory polyneuropathy with axonal and mild change  In addition a more localized neuropathic process involving the left peroneal nerve at the fibular head with acute and chronic denervation change is also noted  EDGAR Bosch

## 2023-01-16 ENCOUNTER — EVALUATION (OUTPATIENT)
Dept: PHYSICAL THERAPY | Facility: CLINIC | Age: 63
End: 2023-01-16

## 2023-01-16 DIAGNOSIS — M54.41 ACUTE BILATERAL LOW BACK PAIN WITH BILATERAL SCIATICA: ICD-10-CM

## 2023-01-16 DIAGNOSIS — M21.372 LEFT FOOT DROP: ICD-10-CM

## 2023-01-16 DIAGNOSIS — M54.42 ACUTE BILATERAL LOW BACK PAIN WITH BILATERAL SCIATICA: ICD-10-CM

## 2023-01-16 DIAGNOSIS — M54.16 LUMBAR RADICULOPATHY, CHRONIC: Primary | ICD-10-CM

## 2023-01-16 NOTE — PROGRESS NOTES
PT Evaluation     Today's date: 2023  Patient name: Barak Hall  : 1960  MRN: [id number]  Referring provider: Anh Childs PT  Dx:   Encounter Diagnosis     ICD-10-CM    1  Lumbar radiculopathy, chronic  M54 16       2  Left foot drop  M21 372                      Assessment  Assessment details: Patient was provided a home exercise program and demonstrated an understanding of exercises  Patient was advised to stop performing home exercise program if symptoms increase or new complaints developed  Verbal understanding demonstrated regarding home exercise program instructions  Patient would benefit from skilled physical therapy services for prescribed exercises, manual interventions, neuromuscular re-education, education, and modalities as deemed appropriate to assist patient in achieving their maximum level of function  Patient presents with approximately 3 months of symptoms including start of LBP -progressing to bilateral thigh pain/ weakness as well as left foot drop ( improving )   He was diagnosed with L4-5 radiculopathy and a recent EMG confirmed this as well as left peroneal n  Injury  Evaluation reveals:  Weakness L > R le's, left (+) foot drop and ankle weakness, cautious gait due to weakness, decreased flexibility bilateral hamstrings, left gastroc m  Atrophy  He presents very motivated to return to prior functional level as well as to restart a regular gym workout regimen  Impairments: abnormal gait, abnormal muscle firing, abnormal or restricted ROM, abnormal movement, activity intolerance, impaired physical strength, lacks appropriate home exercise program and pain with function  Understanding of Dx/Px/POC: good  Goals  STG   1  Patient will demonstrate independence and competence with HEP 2 -4 weeks  2  Patient will report > 25-50% reduced pain/ weakness bilateral anterior thighs 2-4 weeks    LTG   1    Patient will report improvements with both functional and recreational abilities  4-6 weeks  2  Patient will demonstrate improved motor function  4-6 weeks  3   Reducing left foot drop  4-8 weeks  4  Improved flexibility bilateral HS  4-8 weeks  Plan  Plan details: Patient response to treatment will be monitored each session and progressed accordingly    Thank you for this referral    Patient would benefit from: skilled physical therapy  Planned therapy interventions: IADL retraining, joint mobilization, manual therapy, patient education, postural training, strengthening, stretching, therapeutic exercise, flexibility, home exercise program, neuromuscular re-education, balance and gait training  Frequency: 2x week  Duration in weeks: 8  Treatment plan discussed with: patient        Subjective Evaluation    History of Present Illness  Mechanism of injury: L4-5 back issues - given epidural which did help    "I have other issues"   Left foot drop, which is getting better   "my front thighs are sore and my legs feels weak"   "I cannot squat or get myself up without using my arms"    Insidious onset of symptoms x couple of months  He was working doing some wiring in awkward positions- full squatting while twisting  No particular onset / injury  EMG - reveals (+) peroneal injury left which may be a contributor   He also has neuropathy which he feels is contributing to some of his symptoms  He has type 2 DM and was not controlling it well  He has gotten a wake up call with re-diagnosis of DM and is controlling his sugars now, checking himself regularly  He also has HTN and was recently placed on meds - which is helping to lower  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 a couple months ago  He is hoping to get back into regular exercise regimen / back to work when able         Pain  Current pain ratin  At worst pain ratin  Quality: dull ache and radiating  Progression: improved    Social Support  Lives with: spouse    Working: [employer] x 2 months - independent   Diagnostic Tests  CT scan: normal  Treatments  Current treatment: injection treatment  Patient Goals  Patient goals for therapy: increased strength, return to work, decreased pain and increased motion          Objective     Concurrent Complaints  Negative for night pain, disturbed sleep, bladder dysfunction, bowel dysfunction and saddle (S4) numbness    Additional Special Questions  All improving per patient response  Notes that taking gabapentin "knocks him out" and he sleeps in to 10 am      Postural Observations  Seated posture: fair  Standing posture: fair  Correction of posture: has no consistent effect        Palpation   Left   No palpable tenderness to the lumbar paraspinals  Right   No palpable tenderness to the lumbar paraspinals  Tenderness     Lumbar Spine  No tenderness in the spinous process  Left Hip   No tenderness in the PSIS  Right Hip   No tenderness in the PSIS       Neurological Testing     Sensation     Lumbar   Left   Intact: light touch    Right   Intact: light touch    Active Range of Motion     Lumbar   Flexion:  Restriction level: moderate  Extension:  Restriction level: minimal    Joint Play     Hypomobile: L1, L2, L3, L4, L5 and S1   Mechanical Assessment    Cervical      Thoracic      Lumbar    Standing flexion: repeated movements   Pain location:no change  Pain level: increased  Lying flexion: repeated movements  Pain location: no change  Standing extension: repeated movements  Pain location: no change  Lying extension: repeated movements  Pain location: no change    Strength/Myotome Testing     Left Hip   Planes of Motion   Flexion: 4  Extension: 4  Abduction: 4-  External rotation: 4  Internal rotation: 4    Right Hip   Planes of Motion   Flexion: 4+  Extension: 4+  External rotation: 4+  Internal rotation: 4+    Left Knee   Flexion: 5  Extension: 4+    Right Knee   Flexion: 5  Extension: 5    Left Ankle/Foot Dorsiflexion: 2+  Plantar flexion: 3+  Inversion: 4-  Eversion: 3+  Great toe flexion: 2+  Great toe extension: 2+    Right Ankle/Foot   Dorsiflexion: 5  Plantar flexion: 5  Inversion: 5  Eversion: 5    Additional Strength Details  Unable to toe walk/ heel raise independently on left     Tests     Lumbar     Left   Negative crossed SLR, femoral stretch, passive SLR and slump test      Right   Negative crossed SLR, femoral stretch, passive SLR and slump test      Left Hip   Negative JUAN C and FADIR  Right Hip   Negative JUAN C and FADIR  Additional Tests Details  slr = 60 degrees bilaterally - hs tightness only elicited  General Comments:      Lumbar Comments  GAIT - (+) left foot drop, decreased push off / tends to wb laterally on left side  Precautions: L4-5 radiculopathy, 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 )    [employer]  Access Code: [email or web address]        Manuals 1/16            FOTO db                         HSS bilat             Left DF AAROM                          MRE;s left foot                          Neuro Re-Ed             Prone gs             Prone hip ext                          Bridging              HL TB Hip abd                                        Ther Ex             bike             Prone prop 2 min            Press ups 10 x 2            Loc and sag 10 x 2                          Seated HR/ TR 20x ea            hss w/ strap 20s x 5            hc stretch w/ strap 20s x 5             Left ankle tb x 4  As able rtb x 20             SLR / SAQ                                                                  Ther Activity                                       Gait Training                                       Modalities

## 2023-01-17 NOTE — PROGRESS NOTES
Pain Medicine Follow-Up Note    Assessment:  1  Lumbar radiculopathy    2  Peroneal neuropathy, left    3  Lumbar facet arthropathy        Plan:  No orders of the defined types were placed in this encounter  New Medications Ordered This Visit   Medications   • gabapentin (NEURONTIN) 300 mg capsule     Sig: Take 1 capsule (300 mg total) by mouth 3 (three) times a day     Dispense:  270 capsule     Refill:  0       My impressions and treatment recommendations were discussed in detail with the patient who verbalized understanding and had no further questions  This is a 58year old male who returns to our office following left L4 and L5 TFESI with complete resolution of his initial chief complaint of left lower extremity radiculopathy  He did initially make this appointment for right-sided low back pain, however reports that this has also improved as well  He is currently in physical therapy for foot drop secondary to peroneal neuropathy  He is following with podiatry for this as well  Given improvement with interventional treatments at this time, we will not schedule any further injections  There is opportunity to repeat injection as needed in the future if symptoms recur  He does also carry diagnosis of diabetic polyneuropathy and advised to stay on gabapentin 300 mg 3 times daily which she is taking without any significant side effects and noted improvement  A 3-month supply the medication was sent today  He will return in 4 months or sooner if medically necessary for reevaluation  [place] Prescription Drug Monitoring Program report was reviewed and was appropriate     Complete risks and benefits including bleeding, infection, tissue reaction, nerve injury and allergic reaction were discussed  The approach was demonstrated using models and literature was provided  Verbal and written consent was obtained  Discharge instructions were provided   I personally saw and examined the patient and I agree with the above discussed plan of care  History of Present Illness:    Eloisa Shabazz is a 58 y o  male who presents to [facility] for interval re-evaluation of the above stated pain complaints  The patient has a past medical and chronic pain history as outlined in the assessment section  He was last seen on 12/06/22 for left L4 and L5 TFESI  Its considerable improvement in his sciatica symptoms  He is reporting right-sided low back pain today  3 out of 10  Worse at nighttime  It is intermittent, burning, dull/aching with numbness  Since his last visit, patient had EMG of the lower extremities  Shows evidence of polyneuropathy, likely due to diabetes as well as left peroneal neuropathy which is likely responsible for his foot drop  Other than as stated above, the patient denies any interval changes in medications, medical condition, mental condition, symptoms, or allergies since the last office visit  Review of Systems:    Review of Systems   Respiratory: Negative for shortness of breath  Cardiovascular: Negative for chest pain  Gastrointestinal: Negative for constipation, diarrhea, nausea and vomiting  Musculoskeletal: Negative for arthralgias, gait problem, joint swelling and myalgias  Pain in right hip and right lower back   Skin: Negative for rash  Neurological: Negative for dizziness, seizures and weakness  All other systems reviewed and are negative  Past Medical History:   Diagnosis Date   • Diabetes mellitus ([facility] )    • Hyperlipidemia    • Sciatica    • Type 2 diabetes mellitus ([facility] )        History reviewed  No pertinent surgical history      Family History   Problem Relation Age of Onset   • Diabetes Mother    • Alzheimer's disease Mother    • Dementia Father    • Prostate cancer Father    • Hypertension Father    • Hyperlipidemia Father        Social History     Occupational History   • Not on file   Tobacco Use   • Smoking status: Some Days     Types: Cigars   • Smokeless tobacco: Never   • Tobacco comments:     Occasionally   Vaping Use   • Vaping Use: Never used   Substance and Sexual Activity   • Alcohol use: Yes   • Drug use: Not Currently   • Sexual activity: Not on file         Current Outpatient Medications:   •  atorvastatin (LIPITOR) 20 mg tablet, Take 1 tablet (20 mg total) by mouth every evening, Disp: 90 tablet, Rfl: 1  •  Blood Glucose Monitoring Suppl (OneTouch Verio Reflect) w/Device KIT, Check blood sugars once daily  Please substitute with appropriate alternative as covered by patient's insurance  Dx: E11 65, Disp: 1 kit, Rfl: 0  •  gabapentin (NEURONTIN) 300 mg capsule, Take 1 capsule (300 mg total) by mouth 3 (three) times a day, Disp: 270 capsule, Rfl: 0  •  glimepiride (AMARYL) 2 mg tablet, TAKE ONE TABLET BY MOUTH EVERY DAY WITH BREAKFAST, Disp: 90 tablet, Rfl: 0  •  glucose blood (OneTouch Verio) test strip, Check blood sugars once daily  Please substitute with appropriate alternative as covered by patient's insurance  Dx: E11 65, Disp: 100 each, Rfl: 3  •  lisinopril (ZESTRIL) 10 mg tablet, TAKE ONE TABLET BY MOUTH EVERY DAY, Disp: 30 tablet, Rfl: 0  •  OneTouch Delica Lancets 48O MISC, Check blood sugars once daily  Please substitute with appropriate alternative as covered by patient's insurance  Dx: E11 65 (Patient not taking: Reported on 1/12/2023), Disp: 100 each, Rfl: 3    No Known Allergies    Physical Exam:    BP (!) 168/116 (BP Location: Right arm, Patient Position: Sitting, Cuff Size: Standard)   Pulse 94   Wt 80 kg (176 lb 6 4 oz)   BMI 26 05 kg/m²     Constitutional:normal, well developed, well nourished, alert, in no distress and non-toxic and no overt pain behavior    Eyes:anicteric  HEENT:grossly intact  Neck:supple, symmetric, trachea midline and no masses   Pulmonary:even and unlabored  Cardiovascular:No edema or pitting edema present  Skin:Normal without rashes or lesions and well hydrated  Psychiatric:Mood and affect appropriate  Neurologic:Cranial Nerves II-XII grossly intact  Musculoskeletal:normal      Imaging  No orders to display         No orders of the defined types were placed in this encounter

## 2023-01-19 ENCOUNTER — OFFICE VISIT (OUTPATIENT)
Dept: PHYSICAL THERAPY | Facility: CLINIC | Age: 63
End: 2023-01-19

## 2023-01-19 DIAGNOSIS — I10 PRIMARY HYPERTENSION: ICD-10-CM

## 2023-01-19 DIAGNOSIS — M21.372 LEFT FOOT DROP: ICD-10-CM

## 2023-01-19 DIAGNOSIS — M54.16 LUMBAR RADICULOPATHY, CHRONIC: Primary | ICD-10-CM

## 2023-01-19 DIAGNOSIS — E11.9 TYPE 2 DIABETES MELLITUS WITHOUT COMPLICATION, WITHOUT LONG-TERM CURRENT USE OF INSULIN (HCC): ICD-10-CM

## 2023-01-19 RX ORDER — LISINOPRIL 10 MG/1
TABLET ORAL
Qty: 30 TABLET | Refills: 0 | Status: SHIPPED | OUTPATIENT
Start: 2023-01-19

## 2023-01-19 RX ORDER — GLIMEPIRIDE 2 MG/1
TABLET ORAL
Qty: 90 TABLET | Refills: 0 | Status: SHIPPED | OUTPATIENT
Start: 2023-01-19

## 2023-01-19 NOTE — PROGRESS NOTES
Daily Note     Today's date: 2023  Patient name: Tiffanie Waite  : 1960  MRN: [id number]  Referring provider: Carlos Mcghee, PT  Dx:   Encounter Diagnosis     ICD-10-CM    1  Lumbar radiculopathy, chronic  M54 16       2  Left foot drop  M21 372                       Subjective: "I dont feel any difference"   Notes that his back was sore with exercises, but cannot pinpoint any one in particular as the culprit  Objective: See treatment diary below      Assessment: Tolerated treatment fair  Patient demonstrated fatigue post treatment and would benefit from continued PT      Plan: Continue per plan of care  Progress treatment as tolerated  Precautions: L4-5 radiculopathy, 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 )    [employer]  Access Code: [email or web address]        Manuals            FOTO db                         HSS bilat             Left DF AAROM  stretch db                        MRE;s left foot  10 x 2                         Neuro Re-Ed             Prone gs  3s x 20           Prone hip ext  2s x 20 alt                        Bridging   3s x 20             HL TB Hip abd   btb x 20           HL TA w roll  5s x 20                                                               Ther Ex             bike  10'            Prone prop 2 min 2 min            Press ups 10 x 2 10x 2           Loc and sag 10 x 2  10x 2                        Seated HR/ TR 20x ea 20x ea           hss w/ strap 20s x 5 20s x 5 bilat           hc stretch w/ strap 20s x 5  20s x 5 Left           Left ankle tb x 4  As able rtb x 20  TB pf/inv aa df/ever 10x 2            SLR / SAQ   20x ea                                                               Ther Activity                                       Gait Training                                       Modalities

## 2023-01-20 ENCOUNTER — OFFICE VISIT (OUTPATIENT)
Dept: PAIN MEDICINE | Facility: CLINIC | Age: 63
End: 2023-01-20

## 2023-01-20 VITALS
WEIGHT: 176.4 LBS | SYSTOLIC BLOOD PRESSURE: 174 MMHG | DIASTOLIC BLOOD PRESSURE: 97 MMHG | HEART RATE: 85 BPM | BODY MASS INDEX: 26.05 KG/M2

## 2023-01-20 DIAGNOSIS — G57.32 PERONEAL NEUROPATHY, LEFT: ICD-10-CM

## 2023-01-20 DIAGNOSIS — M47.816 LUMBAR FACET ARTHROPATHY: ICD-10-CM

## 2023-01-20 DIAGNOSIS — M54.16 LUMBAR RADICULOPATHY: Primary | ICD-10-CM

## 2023-01-20 RX ORDER — GABAPENTIN 300 MG/1
300 CAPSULE ORAL 3 TIMES DAILY
Qty: 270 CAPSULE | Refills: 0 | Status: SHIPPED | OUTPATIENT
Start: 2023-01-20

## 2023-01-25 ENCOUNTER — APPOINTMENT (OUTPATIENT)
Dept: LAB | Facility: HOSPITAL | Age: 63
End: 2023-01-25

## 2023-01-25 ENCOUNTER — OFFICE VISIT (OUTPATIENT)
Dept: PHYSICAL THERAPY | Facility: CLINIC | Age: 63
End: 2023-01-25

## 2023-01-25 DIAGNOSIS — E11.9 TYPE 2 DIABETES MELLITUS WITHOUT COMPLICATION, WITHOUT LONG-TERM CURRENT USE OF INSULIN (HCC): ICD-10-CM

## 2023-01-25 DIAGNOSIS — M54.42 ACUTE BILATERAL LOW BACK PAIN WITH BILATERAL SCIATICA: ICD-10-CM

## 2023-01-25 DIAGNOSIS — M54.16 LUMBAR RADICULOPATHY, CHRONIC: Primary | ICD-10-CM

## 2023-01-25 DIAGNOSIS — M21.372 LEFT FOOT DROP: ICD-10-CM

## 2023-01-25 DIAGNOSIS — M54.41 ACUTE BILATERAL LOW BACK PAIN WITH BILATERAL SCIATICA: ICD-10-CM

## 2023-01-25 LAB
ALBUMIN SERPL BCP-MCNC: 3.5 G/DL (ref 3.5–5)
ALP SERPL-CCNC: 66 U/L (ref 46–116)
ALT SERPL W P-5'-P-CCNC: 113 U/L (ref 12–78)
ANION GAP SERPL CALCULATED.3IONS-SCNC: 6 MMOL/L (ref 4–13)
AST SERPL W P-5'-P-CCNC: 210 U/L (ref 5–45)
BILIRUB SERPL-MCNC: 0.79 MG/DL (ref 0.2–1)
BUN SERPL-MCNC: 14 MG/DL (ref 5–25)
CALCIUM SERPL-MCNC: 9.1 MG/DL (ref 8.3–10.1)
CHLORIDE SERPL-SCNC: 105 MMOL/L (ref 96–108)
CO2 SERPL-SCNC: 30 MMOL/L (ref 21–32)
CREAT SERPL-MCNC: 1.1 MG/DL (ref 0.6–1.3)
ERYTHROCYTE [DISTWIDTH] IN BLOOD BY AUTOMATED COUNT: 12.7 % (ref 11.6–15.1)
EST. AVERAGE GLUCOSE BLD GHB EST-MCNC: 134 MG/DL
GFR SERPL CREATININE-BSD FRML MDRD: 71 ML/MIN/1.73SQ M
GLUCOSE P FAST SERPL-MCNC: 154 MG/DL (ref 65–99)
HBA1C MFR BLD: 6.3 %
HCT VFR BLD AUTO: 43.7 % (ref 36.5–49.3)
HGB BLD-MCNC: 14.6 G/DL (ref 12–17)
MCH RBC QN AUTO: 30 PG (ref 26.8–34.3)
MCHC RBC AUTO-ENTMCNC: 33.4 G/DL (ref 31.4–37.4)
MCV RBC AUTO: 90 FL (ref 82–98)
PLATELET # BLD AUTO: 195 THOUSANDS/UL (ref 149–390)
PMV BLD AUTO: 11.3 FL (ref 8.9–12.7)
POTASSIUM SERPL-SCNC: 4.1 MMOL/L (ref 3.5–5.3)
PROT SERPL-MCNC: 6.8 G/DL (ref 6.4–8.4)
RBC # BLD AUTO: 4.86 MILLION/UL (ref 3.88–5.62)
SODIUM SERPL-SCNC: 141 MMOL/L (ref 135–147)
WBC # BLD AUTO: 6.43 THOUSAND/UL (ref 4.31–10.16)

## 2023-01-25 NOTE — PROGRESS NOTES
Daily Note     Today's date: 2023  Patient name: Barak Hall  : 1960  MRN: [id number]  Referring provider: Anh Childs, PT  Dx:   Encounter Diagnosis     ICD-10-CM    1  Lumbar radiculopathy, chronic  M54 16       2  Left foot drop  M21 372       3  Acute bilateral low back pain with bilateral sciatica  M54 42     M54 41                      Subjective: Patient stated he was on a lift, squatting, etc at work for > 5 hours yesterday with resultant increased discomfort/muscular fatigue  Objective: See treatment diary below      Assessment: Tolerated treatment without report of exacerbation of back symptoms  Encouraged TE performance only to tolerance and to pace activity to avoid excessive muscle fatigue   Patient demonstrated significant fatigue post treatment and would benefit from continued PT      Plan: Continue per plan of care  Progress treatment as tolerated  Precautions: L4-5 radiculopathy, 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 )    [employer]  Access Code: [email or web address]        Manuals           FOTO db                         HSS bilat             Left DF AAROM  stretch db LA  stretch                       MRE;s left foot  10 x 2  10x2  LA                       Neuro Re-Ed             Prone gs  3s x 20 :03  20x          Prone hip ext  2s x 20 alt Alt  :02  20x                       Bridging   3s x 20   :03  20x          HL TB Hip abd   btb x 20 BTB  20x           HL TA w roll  5s x 20 :05  20x                                                              Ther Ex             bike  10'            Prone prop 2 min 2 min  2'          Press ups 10 x 2 10x 2 10x2          Loc and sag 10 x 2  10x 2 10x2                       Seated HR/ TR 20x ea 20x ea           B hss w/ strap 20s x 5 20s x 5 bilat :20  3x each          hc stretch w/ strap 20s x 5  20s x 5 Left :20  3x          Left ankle tb x 4  As able rtb x 20  TB pf/inv aa df/ever 10x 2  RTB  20x each  PF/DF/INV/EV          SLR / SAQ   20x ea 20x each                                                              Ther Activity                                       Gait Training                                       Modalities

## 2023-01-27 ENCOUNTER — OFFICE VISIT (OUTPATIENT)
Dept: PHYSICAL THERAPY | Facility: CLINIC | Age: 63
End: 2023-01-27

## 2023-01-27 DIAGNOSIS — M21.372 LEFT FOOT DROP: ICD-10-CM

## 2023-01-27 DIAGNOSIS — M54.16 LUMBAR RADICULOPATHY, CHRONIC: Primary | ICD-10-CM

## 2023-01-27 DIAGNOSIS — M54.41 ACUTE BILATERAL LOW BACK PAIN WITH BILATERAL SCIATICA: ICD-10-CM

## 2023-01-27 DIAGNOSIS — M54.42 ACUTE BILATERAL LOW BACK PAIN WITH BILATERAL SCIATICA: ICD-10-CM

## 2023-01-27 NOTE — PROGRESS NOTES
Daily Note     Today's date: 2023  Patient name: Yessy Barker  : 1960  MRN: [id number]  Referring provider: Lorie Singh, PANTERA  Dx:   Encounter Diagnosis     ICD-10-CM    1  Lumbar radiculopathy, chronic  M54 16       2  Left foot drop  M21 372       3  Acute bilateral low back pain with bilateral sciatica  M54 42     M54 41                      Subjective: Patient reports that he worked again yesterday and had soreness in the evening   He did not perform any HEP  Notes that his soreness is R LS with proximal right hip region achiness as well  Otherwise, he feels "OK" overall  C/o fatigue during session/ post session     Objective: See treatment diary below      Assessment: tolerated session fair overall - weakness dominates   He favors prone positioning with improved back symptoms following prone/ press ups  Plan: Continue per plan of care  Progress treatment as tolerated  Precautions: L4-5 radiculopathy, 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 )    [employer]  Access Code: [email or web address]        Manuals          FOTO db   db                      HSS bilat             Left DF AAROM  stretch db LA  stretch db                      MRE;s left foot  10 x 2  10x2  LA                       Neuro Re-Ed             Prone gs  3s x 20 :03  20x 3s x 20         Prone hip ext  2s x 20 alt Alt  :02  20x Alt 2s x 20          Prone hip ext w/ knee flexed     10 x 2          Bridging   3s x 20   :03  20x btb x 20          HL TB Hip abd   btb x 20 BTB  20x  btb x 20          Clamshells     btb x 20 bilat         HL TA w roll  5s x 20 :05  20x          TG L10     20 x 2                                                 Ther Ex             bike  10'   10'         R SG in standing    10 x 2         Prone prop 2 min 2 min  2' 1'         Press ups 10 x 2 10x 2 10x2 10 x 2         Loc and sag 10 x 2  10x 2 10x2 10 x 2                      Seated HR/ TR 20x ea 20x ea           B hss w/ strap 20s x 5 20s x 5 bilat :20  3x each 20s x 3 bilat         hc stretch w/ strap 20s x 5  20s x 5 Left :20  3x          Left ankle tb x 4  As able rtb x 20  TB pf/inv aa df/ever 10x 2  RTB  20x each  PF/DF/INV/EV aa prn x 20 ea         SLR / SAQ   20x ea 20x each 1 5# x 20 ea                                                             Ther Activity                                       Gait Training                                       Modalities

## 2023-02-01 ENCOUNTER — OFFICE VISIT (OUTPATIENT)
Dept: PHYSICAL THERAPY | Facility: CLINIC | Age: 63
End: 2023-02-01

## 2023-02-01 DIAGNOSIS — M54.16 LUMBAR RADICULOPATHY, CHRONIC: ICD-10-CM

## 2023-02-01 DIAGNOSIS — M21.372 LEFT FOOT DROP: Primary | ICD-10-CM

## 2023-02-01 DIAGNOSIS — M54.42 ACUTE BILATERAL LOW BACK PAIN WITH BILATERAL SCIATICA: ICD-10-CM

## 2023-02-01 DIAGNOSIS — M54.41 ACUTE BILATERAL LOW BACK PAIN WITH BILATERAL SCIATICA: ICD-10-CM

## 2023-02-01 NOTE — PROGRESS NOTES
Daily Note     Today's date: 2023  Patient name: Landen Randall  : 1960  MRN: [id number]  Referring provider: Lourdes Cherry, PT  Dx:   Encounter Diagnosis     ICD-10-CM    1  Left foot drop  M21 372       2  Lumbar radiculopathy, chronic  M54 16       3  Acute bilateral low back pain with bilateral sciatica  M54 42     M54 41                      Subjective: patient states that he is sore today  - that he did indeed work yesterday   Anxious to start seeing improvements but feels that he really has not seen much change since starting PT  Objective: See treatment diary below      Assessment: tolerated session fair overall - left leg with significant weakness, buckling occasional in standing with new pres'     Plan: Continue per plan of care  Progress treatment as tolerated  Precautions: L4-5 radiculopathy, 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 )    [employer]  Access Code: [email or web address]        Manuals         FOTO db   db                      HSS bilat             Left DF AAROM  stretch db LA  stretch db db                     MRE;s left foot  10 x 2  10x2  LA  10 x 2                      Neuro Re-Ed             Prone gs  3s x 20 :03  20x 3s x 20 3s x 20        Prone hip ext  2s x 20 alt Alt  :02  20x Alt 2s x 20  Alt  20x        Prone hs curls      2# 10 x 2 bilat        Prone hip ext w/ knee flexed     10 x 2  2#  10 x 2 bilat        Bridging   3s x 20   :03  20x btb x 20  BTB  20x        HL TB Hip abd   btb x 20 BTB  20x  btb x 20  btb x 20         Clamshells     btb x 20 bilat btb x 20         HL TA w roll  5s x 20 :05  20x          TG L10     20 x 2  20 x 2        Step ups  F/L     8" x 20 ea                                   Ther Ex             bike  10'   10' 10'         R SG in standing    10 x 2 10 x 2        Prone prop 2 min 2 min  2' 1'         Press ups 10 x 2 10x 2 10x2 10 x 2 10 x 2        Loc and sag 10 x 2  10x 2 10x2 10 x 2 10 x 2 Seated HR/ TR 20x ea 20x ea           B hss w/ strap 20s x 5 20s x 5 bilat :20  3x each 20s x 3 bilat 20s x bilat         hc stretch w/ strap 20s x 5  20s x 5 Left :20  3x          Left ankle tb x 4  As able rtb x 20  TB pf/inv aa df/ever 10x 2  RTB  20x each  PF/DF/INV/EV aa prn x 20 ea Pf, inv x 20 btb        SLR / SAQ   20x ea 20x each 1 5# x 20 ea HEP                     Stand hip abd, hs curls, ext     Foam 2# x 20        Squats @ bar      Foam x 20         prostretch      nv                                                                         Ther Activity                                       Gait Training                                       Modalities

## 2023-02-03 ENCOUNTER — OFFICE VISIT (OUTPATIENT)
Dept: PHYSICAL THERAPY | Facility: CLINIC | Age: 63
End: 2023-02-03

## 2023-02-03 DIAGNOSIS — M21.372 LEFT FOOT DROP: Primary | ICD-10-CM

## 2023-02-03 DIAGNOSIS — M54.16 LUMBAR RADICULOPATHY, CHRONIC: ICD-10-CM

## 2023-02-03 DIAGNOSIS — M54.42 ACUTE BILATERAL LOW BACK PAIN WITH BILATERAL SCIATICA: ICD-10-CM

## 2023-02-03 DIAGNOSIS — M54.41 ACUTE BILATERAL LOW BACK PAIN WITH BILATERAL SCIATICA: ICD-10-CM

## 2023-02-03 NOTE — PROGRESS NOTES
Daily Note     Today's date: 2/3/2023  Patient name: Rachael Gutierrez  : 1960  MRN: [id number]  Referring provider: Karsten Ospina, PT  Dx:   Encounter Diagnosis     ICD-10-CM    1  Left foot drop  M21 372       2  Lumbar radiculopathy, chronic  M54 16       3  Acute bilateral low back pain with bilateral sciatica  M54 42     M54 41                      Subjective: Patient reported he worked yesterday and woke with "workout type soreness"  Objective: See treatment diary below      Assessment: Tolerated treatment continuing to exhibit core and LE weakness  Note improved hamstring flexibility this visit  Patient appears to be gradually responding to therapeutic intervention with notable improvement in strength and AROM  Patient demonstrated fatigue post treatment and would benefit from continued PT      Plan: Continue per plan of care  Progress treatment as tolerated  Precautions: L4-5 radiculopathy, 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 )    [employer]  Access Code: [email or web address]        Manuals  2/3       FOTO db   db                      HSS bilat             Left DF AAROM  stretch db LA  stretch db db LA                    MRE;s left foot  10 x 2  10x2  LA  10 x 2  10x2                    Neuro Re-Ed             Prone gs  3s x 20 :03  20x 3s x 20 3s x 20 :03  20x       Prone hip ext  2s x 20 alt Alt  :02  20x Alt 2s x 20  Alt  20x Alt  20x       Prone hs curls      2# 10 x 2 bilat B  2#  10x2       Prone hip ext w/ knee flexed     10 x 2  2#  10 x 2 bilat B  2#  10x2       Bridging   3s x 20   :03  20x btb x 20  BTB  20x BTB  20x       HL TB Hip abd   btb x 20 BTB  20x  btb x 20  btb x 20  BTB  20x       Clamshells     btb x 20 bilat btb x 20  B  BTB  20x       HL TA w roll  5s x 20 :05  20x          TG L10     20 x 2  20 x 2 20x2       Step ups  F/L     8" x 20 ea                                   Ther Ex             bike  10'   10' 10'  10'       R SG in standing    10 x 2 10 x 2        Prone prop 2 min 2 min  2' 1'         Press ups 10 x 2 10x 2 10x2 10 x 2 10 x 2 10x2       Loc and sag 10 x 2  10x 2 10x2 10 x 2 10 x 2 10x2                    Seated HR/ TR 20x ea 20x ea           B hss w/ strap 20s x 5 20s x 5 bilat :20  3x each 20s x 3 bilat 20s x bilat  :20  3x each       hc stretch w/ strap 20s x 5  20s x 5 Left :20  3x          Left ankle tb x 4  As able rtb x 20  TB pf/inv aa df/ever 10x 2  RTB  20x each  PF/DF/INV/EV aa prn x 20 ea Pf, inv x 20 btb PF/INV  BTB  20x       SLR / SAQ   20x ea 20x each 1 5# x 20 ea HEP                     Stand hip abd, hs curls, ext     Foam 2# x 20 Foam  2#  20x       Squats @ bar      Foam x 20  Foam  20x       prostretch      nv L  :20  3x                                                                        Ther Activity                                       Gait Training                                       Modalities

## 2023-02-08 ENCOUNTER — OFFICE VISIT (OUTPATIENT)
Dept: PHYSICAL THERAPY | Facility: CLINIC | Age: 63
End: 2023-02-08

## 2023-02-08 ENCOUNTER — OFFICE VISIT (OUTPATIENT)
Dept: FAMILY MEDICINE CLINIC | Facility: CLINIC | Age: 63
End: 2023-02-08

## 2023-02-08 VITALS
HEART RATE: 92 BPM | DIASTOLIC BLOOD PRESSURE: 98 MMHG | BODY MASS INDEX: 25.89 KG/M2 | OXYGEN SATURATION: 98 % | SYSTOLIC BLOOD PRESSURE: 150 MMHG | TEMPERATURE: 96 F | HEIGHT: 69 IN | WEIGHT: 174.8 LBS

## 2023-02-08 DIAGNOSIS — M54.16 LUMBAR RADICULOPATHY, CHRONIC: ICD-10-CM

## 2023-02-08 DIAGNOSIS — M54.41 ACUTE BILATERAL LOW BACK PAIN WITH BILATERAL SCIATICA: ICD-10-CM

## 2023-02-08 DIAGNOSIS — E78.2 MIXED HYPERLIPIDEMIA: ICD-10-CM

## 2023-02-08 DIAGNOSIS — M21.372 LEFT FOOT DROP: Primary | ICD-10-CM

## 2023-02-08 DIAGNOSIS — E11.9 TYPE 2 DIABETES MELLITUS WITHOUT COMPLICATION, WITHOUT LONG-TERM CURRENT USE OF INSULIN (HCC): ICD-10-CM

## 2023-02-08 DIAGNOSIS — M54.42 ACUTE BILATERAL LOW BACK PAIN WITH BILATERAL SCIATICA: ICD-10-CM

## 2023-02-08 DIAGNOSIS — I10 PRIMARY HYPERTENSION: Primary | ICD-10-CM

## 2023-02-08 RX ORDER — LISINOPRIL 20 MG/1
20 TABLET ORAL DAILY
Qty: 90 TABLET | Refills: 1 | Status: SHIPPED | OUTPATIENT
Start: 2023-02-08

## 2023-02-08 RX ORDER — LANCETS 33 GAUGE
EACH MISCELLANEOUS
Qty: 100 EACH | Refills: 3 | Status: SHIPPED | OUTPATIENT
Start: 2023-02-08

## 2023-02-08 RX ORDER — BLOOD SUGAR DIAGNOSTIC
STRIP MISCELLANEOUS
Qty: 100 EACH | Refills: 3 | Status: SHIPPED | OUTPATIENT
Start: 2023-02-08

## 2023-02-08 NOTE — ASSESSMENT & PLAN NOTE
Hemoglobin A1c much improved  Physical activity, weight management, and diabetic diet  To continue on glimepiride 2 mg daily      Lab Results   Component Value Date    HGBA1C 6 3 (H) 01/25/2023

## 2023-02-08 NOTE — PATIENT INSTRUCTIONS
Heart Healthy Diet   AMBULATORY CARE:   A heart healthy diet  is an eating plan low in unhealthy fats and sodium (salt)  The plan is high in healthy fats and fiber  A heart healthy diet helps improve your cholesterol levels and lowers your risk for heart disease and stroke  A dietitian will teach you how to read and understand food labels  Heart healthy diet guidelines to follow:   Choose foods that contain healthy fats  Unsaturated fats  include monounsaturated and polyunsaturated fats  Unsaturated fat is found in foods such as soybean, canola, olive, corn, and safflower oils  It is also found in soft tub margarine that is made with liquid vegetable oil  Omega-3 fat  is found in certain fish, such as salmon, tuna, and trout, and in walnuts and flaxseed  Eat fish high in omega-3 fats at least 2 times a week  Get 20 to 30 grams of fiber each day  Fruits, vegetables, whole-grain foods, and legumes (cooked beans) are good sources of fiber  Limit or do not have unhealthy fats  Cholesterol  is found in animal foods, such as eggs and lobster, and in dairy products made from whole milk  Limit cholesterol to less than 200 mg each day  Saturated fat  is found in meats, such as meyer and hamburger  It is also found in chicken or turkey skin, whole milk, and butter  Limit saturated fat to less than 7% of your total daily calories  Trans fat  is found in packaged foods, such as potato chips and cookies  It is also in hard margarine, some fried foods, and shortening  Do not eat foods that contain trans fats  Limit sodium as directed  You may be told to limit sodium to 2,000 to 2,300 mg each day  Choose low-sodium or no-salt-added foods  Add little or no salt to food you prepare  Use herbs and spices in place of salt         Include the following in your heart healthy plan:  Ask your dietitian or healthcare provider how many servings to have from each of the following food groups:  Grains: Whole-wheat breads, cereals, and pastas, and brown rice    Low-fat, low-sodium crackers and chips    Vegetables:      Broccoli, green beans, green peas, and spinach    Collards, kale, and lima beans    Carrots, sweet potatoes, tomatoes, and peppers    Canned vegetables with no salt added    Fruits:      Bananas, peaches, pears, and pineapple    Grapes, raisins, and dates    Oranges, tangerines, grapefruit, orange juice, and grapefruit juice    Apricots, mangoes, melons, and papaya    Raspberries and strawberries    Canned fruit with no added sugar    Low-fat dairy:      Nonfat (skim) milk, 1% milk, and low-fat almond, cashew, or soy milks fortified with calcium    Low-fat cheese, regular or frozen yogurt, and cottage cheese    Meats and proteins:      Lean cuts of beef and pork (loin, leg, round), skinless chicken and turkey    Legumes, soy products, egg whites, or nuts    Limit or do not include the following in your heart healthy plan:   Unhealthy fats and oils:      Whole or 2% milk, cream cheese, sour cream, or cheese    High-fat cuts of beef (T-bone steaks, ribs), chicken or turkey with skin, and organ meats such as liver    Butter, stick margarine, shortening, and cooking oils such as coconut or palm oil    Foods and liquids high in sodium:      Packaged foods, such as frozen dinners, cookies, macaroni and cheese, and cereals with more than 300 mg of sodium per serving    Vegetables with added sodium, such as instant potatoes, vegetables with added sauces, or regular canned vegetables    Cured or smoked meats, such as hot dogs, meyer, and sausage    High-sodium ketchup, barbecue sauce, salad dressing, pickles, olives, soy sauce, or miso    Foods and liquids high in sugar:      Candy, cake, cookies, pies, or doughnuts    Soft drinks (soda), sports drinks, or sweetened tea    Canned or dry mixes for cakes, soups, sauces, or gravies    Other healthy heart guidelines:   Do not smoke    Nicotine and other chemicals in cigarettes and cigars can cause lung and heart damage  Ask your healthcare provider for information if you currently smoke and need help to quit  E-cigarettes or smokeless tobacco still contain nicotine  Talk to your healthcare provider before you use these products  Limit or do not drink alcohol as directed  Alcohol can damage your heart and raise your blood pressure  Your healthcare provider may give you specific daily and weekly limits  The general recommended limit is 1 drink a day for women 21 or older and for men 72 or older  Do not have more than 3 drinks in a day or 7 in a week  The recommended limit is 2 drinks a day for men 24to 59years of age  Do not have more than 4 drinks in a day or 14 in a week  A drink of alcohol is 12 ounces of beer, 5 ounces of wine, or 1½ ounces of liquor  Exercise regularly  Exercise can help you maintain a healthy weight and improve your blood pressure and cholesterol levels  Regular exercise can also decrease your risk for heart problems  Ask your healthcare provider about the best exercise plan for you  Do not start an exercise program without asking your healthcare provider  Follow up with your doctor or cardiologist as directed:  Write down your questions so you remember to ask them during your visits  © [employer] 2022 Information is for End User's use only and may not be sold, redistributed or otherwise used for commercial purposes  All illustrations and images included in CareNotes® are the copyrighted property of [employer]  or [employer]ie [employer]he   The above information is an  only  It is not intended as medical advice for individual conditions or treatments  Talk to your doctor, nurse or pharmacist before following any medical regimen to see if it is safe and effective for you

## 2023-02-08 NOTE — ASSESSMENT & PLAN NOTE
Blood pressure not at goal in office today  Patient states blood pressures at home have been 150's/80-90  Patient denies symptoms related to high blood pressure  Has been attempting to make diet modifications, cutting beer and limiting sodium intake  Will increase lisinopril to 20 mg daily  To continue to record blood pressures, return in 4 weeks for reevaluation or sooner if needed

## 2023-02-08 NOTE — PROGRESS NOTES
Daily Note     Today's date: 2023  Patient name: Nba Colunga  : 1960  MRN: [id number]  Referring provider: Sasha Handley, PT  Dx: No diagnosis found  Subjective: Patient noted putting electrical socket 18 inches from floor with resultant increased hip flexor restrictions/discomfort  Patient stated he notices improved mobility/AROM of left ankle and improved ability to DF during ambulation/work  Patient reported minimal to no compliance with current HEP secondary to long working hours and fatigue post work  Patient reported significant difference in hamstring strength; L weaker than R       Objective: See treatment diary below      Assessment: Tolerated treatment well  Patient demonstrated fatigue post treatment and would benefit from continued PT      Plan: Continue per plan of care  Progress treatment as tolerated  Precautions: L4-5 radiculopathy, 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 )    [facility]  Access Code: [email or web address]        Manuals 1/16 1/19 1/25 1/27 2/1 2/3 2/8      FOTO db   db                      HSS bilat             Left DF AAROM  stretch db LA  stretch db db LA LA                   MRE;s left foot  10 x 2  10x2  LA  10 x 2  10x2 10x2                   Neuro Re-Ed             Prone gs  3s x 20 :03  20x 3s x 20 3s x 20 :03  20x :03  20x      Prone hip ext  2s x 20 alt Alt  :02  20x Alt 2s x 20  Alt  20x Alt  20x Alt  2#  20x      Prone hs curls      2# 10 x 2 bilat B  2#  10x2 B  2#  10x2      Prone hip ext w/ knee flexed     10 x 2  2#  10 x 2 bilat B  2#  10x2 B  2#  10x2       Bridging   3s x 20   :03  20x btb x 20  BTB  20x BTB  20x BTB  20x      HL TB Hip abd   btb x 20 BTB  20x  btb x 20  btb x 20  BTB  20x BTB  20x      Clamshells     btb x 20 bilat btb x 20  B  BTB  20x B BTB  20x      HL TA w roll  5s x 20 :05  20x          TG L10     20 x 2  20 x 2 20x2 20x2      Step ups  F/L     8" x 20 ea                                   Ther Ex bike  10'   10' 10'  10' 10'      R SG in standing    10 x 2 10 x 2        Prone prop 2 min 2 min  2' 1'         Press ups 10 x 2 10x 2 10x2 10 x 2 10 x 2 10x2 10x2      Loc and sag 10 x 2  10x 2 10x2 10 x 2 10 x 2 10x2 10x2                   Seated HR/ TR 20x ea 20x ea           B hss w/ strap 20s x 5 20s x 5 bilat :20  3x each 20s x 3 bilat 20s x bilat  :20  3x each :20  3x each      hc stretch w/ strap 20s x 5  20s x 5 Left :20  3x          Left ankle tb x 4  As able rtb x 20  TB pf/inv aa df/ever 10x 2  RTB  20x each  PF/DF/INV/EV aa prn x 20 ea Pf, inv x 20 btb PF/INV  BTB  20x BTB  (all)  20x each      SLR / SAQ   20x ea 20x each 1 5# x 20 ea HEP                     Stand hip abd, hs curls, ext     Foam 2# x 20 Foam  2#  20x Foam  2#  20x      Squats @ bar      Foam x 20  Foam  20x Foam  20x      prostretch      nv L  :20  3x L  :20  3x                                                                       Ther Activity                                       Gait Training                                       Modalities

## 2023-02-08 NOTE — PROGRESS NOTES
Name: Amarjit Martinez      : 1960      MRN: [id number]  Encounter Provider: YESENIA Louie  Encounter Date: 2023   Encounter department: [facility]     1  Primary hypertension  Assessment & Plan:  Blood pressure not at goal in office today  Patient states blood pressures at home have been 150's/80-90  Patient denies symptoms related to high blood pressure  Has been attempting to make diet modifications, cutting beer and limiting sodium intake  Will increase lisinopril to 20 mg daily  To continue to record blood pressures, return in 4 weeks for reevaluation or sooner if needed  Orders:  -     lisinopril (ZESTRIL) 20 mg tablet; Take 1 tablet (20 mg total) by mouth daily    2  Type 2 diabetes mellitus without complication, without long-term current use of insulin ([facility])  Assessment & Plan:  Hemoglobin A1c much improved  Physical activity, weight management, and diabetic diet  To continue on glimepiride 2 mg daily  Lab Results   Component Value Date    HGBA1C 6 3 (H) 2023       Orders:  -     glucose blood (OneTouch Verio) test strip; Check blood sugars once daily  Please substitute with appropriate alternative as covered by patient's insurance  Dx: E11 65  -     OneTouch Delica Lancets 93R MISC; Check blood sugars once daily  Please substitute with appropriate alternative as covered by patient's insurance  Dx: E11 65    3  Mixed hyperlipidemia  -     Comprehensive metabolic panel; Future  -     Lipid panel; Future         Subjective      Patient presents the office for 4-week checkup  Patient was initiated on lisinopril 10 mg at last visit  As per patient, has been compliant with medication  Patient has been monitoring blood pressures at home  Readings at home have been 150's/80-90  She denies symptoms related to high blood pressure    Has been attempting to increase physical activity as tolerated and has cut beer out of his diet  Patient denies headaches, dizziness, visual disturbances, chest pain, palpitations, peripheral edema  Review of Systems   Constitutional: Negative for activity change, appetite change and fatigue  HENT: Negative for sore throat and trouble swallowing  Eyes: Negative for photophobia and visual disturbance  Respiratory: Negative for chest tightness and shortness of breath  Cardiovascular: Negative for chest pain, palpitations and leg swelling  Gastrointestinal: Negative for abdominal pain, nausea and vomiting  Genitourinary: Negative for decreased urine volume  Musculoskeletal: Negative for arthralgias and myalgias  Skin: Negative for color change and rash  Neurological: Negative for dizziness, weakness, light-headedness and headaches  Psychiatric/Behavioral: Negative for confusion  Current Outpatient Medications on File Prior to Visit   Medication Sig   • atorvastatin (LIPITOR) 20 mg tablet Take 1 tablet (20 mg total) by mouth every evening   • Blood Glucose Monitoring Suppl (OneTouch Verio Reflect) w/Device KIT Check blood sugars once daily  Please substitute with appropriate alternative as covered by patient's insurance  Dx: E11 65   • gabapentin (NEURONTIN) 300 mg capsule Take 1 capsule (300 mg total) by mouth 3 (three) times a day   • glimepiride (AMARYL) 2 mg tablet TAKE ONE TABLET BY MOUTH EVERY DAY WITH BREAKFAST   • [DISCONTINUED] glucose blood (OneTouch Verio) test strip Check blood sugars once daily  Please substitute with appropriate alternative as covered by patient's insurance  Dx: E11 65   • [DISCONTINUED] lisinopril (ZESTRIL) 10 mg tablet TAKE ONE TABLET BY MOUTH EVERY DAY   • [DISCONTINUED] OneTouch Delica Lancets 47G MISC Check blood sugars once daily  Please substitute with appropriate alternative as covered by patient's insurance   Dx: E11 65       Objective     /98 (BP Location: Left arm, Patient Position: Sitting, Cuff Size: Standard)   Pulse 92   Temp (!) 96 °F (35 6 °C) (Tympanic)   Ht 5' 9" (1 753 m)   Wt 79 3 kg (174 lb 12 8 oz)   SpO2 98%   BMI 25 81 kg/m²     Physical Exam  Vitals reviewed  Constitutional:       General: He is not in acute distress  Appearance: Normal appearance  He is not ill-appearing  HENT:      Head: Normocephalic and atraumatic  Right Ear: Tympanic membrane, ear canal and external ear normal       Left Ear: Tympanic membrane, ear canal and external ear normal       Nose: Nose normal       Mouth/Throat:      Mouth: Mucous membranes are moist       Pharynx: Oropharynx is clear  Eyes:      Conjunctiva/sclera: Conjunctivae normal       Pupils: Pupils are equal, round, and reactive to light  Neck:      Vascular: No carotid bruit  Cardiovascular:      Pulses: Normal pulses  Heart sounds: Normal heart sounds  No murmur heard  Pulmonary:      Effort: Pulmonary effort is normal       Breath sounds: Normal breath sounds  Abdominal:      General: Bowel sounds are normal       Palpations: Abdomen is soft  Tenderness: There is no abdominal tenderness  Musculoskeletal:         General: Normal range of motion  Cervical back: Normal range of motion and neck supple  Right lower leg: No edema  Left lower leg: No edema  Skin:     General: Skin is warm and dry  Capillary Refill: Capillary refill takes less than 2 seconds  Neurological:      General: No focal deficit present  Mental Status: He is alert and oriented to person, place, and time     Psychiatric:         Mood and Affect: Mood normal          Behavior: Behavior normal        YESENIA Amezcua

## 2023-02-10 ENCOUNTER — OFFICE VISIT (OUTPATIENT)
Dept: PHYSICAL THERAPY | Facility: CLINIC | Age: 63
End: 2023-02-10

## 2023-02-10 DIAGNOSIS — M54.16 LUMBAR RADICULOPATHY, CHRONIC: ICD-10-CM

## 2023-02-10 DIAGNOSIS — M21.372 LEFT FOOT DROP: Primary | ICD-10-CM

## 2023-02-10 DIAGNOSIS — M54.41 ACUTE BILATERAL LOW BACK PAIN WITH BILATERAL SCIATICA: ICD-10-CM

## 2023-02-10 DIAGNOSIS — M54.42 ACUTE BILATERAL LOW BACK PAIN WITH BILATERAL SCIATICA: ICD-10-CM

## 2023-02-10 NOTE — PROGRESS NOTES
Daily Note     Today's date: 2/10/2023  Patient name: Toño Galan  : 1960  MRN: [id number]  Referring provider: Tatiana Mendoza, PT  Dx:   Encounter Diagnosis     ICD-10-CM    1  Left foot drop  M21 372       2  Lumbar radiculopathy, chronic  M54 16       3  Acute bilateral low back pain with bilateral sciatica  M54 42     M54 41                      Subjective: patient reports that he was sore for a couple of days after last session  Notes that sleeping was uncomfortable - he states that he has recovered by today  Objective: See treatment diary below      Assessment: Tolerated treatment well  Patient demonstrated fatigue post treatment and would benefit from continued PT   Patient with significant weakness left LE - particularly hs and he compensates with his hip flexors in prone -  This could be contributing to his hip flexor soreness   ( stretching to area helpful )       Plan: Continue per plan of care  Progress treatment as tolerated  Precautions: L4-5 radiculopathy, 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 )    [employer]  Access Code: [email or web address]        Manuals 1/16 1/19 1/25 1/27 2/1 2/3 2/8 2/10     FOTO db   db                      HSS bilat             Left DF AAROM  stretch db LA  stretch db db LA LA db                  MRE;s left foot  10 x 2  10x2  LA  10 x 2  10x2 10x2 20x                   Neuro Re-Ed             Prone gs  3s x 20 :03  20x 3s x 20 3s x 20 :03  20x :03  20x 3s x 20      Prone hip ext  2s x 20 alt Alt  :02  20x Alt 2s x 20  Alt  20x Alt  20x Alt  2#  20x 2# x 20     Prone hs curls      2# 10 x 2 bilat B  2#  10x2 B  2#  10x2 2#  x20 R  L unable     Prone hip ext w/ knee flexed     10 x 2  2#  10 x 2 bilat B  2#  10x2 B  2#  10x2  2# x 20      Bridging   3s x 20   :03  20x btb x 20  BTB  20x BTB  20x BTB  20x btb x 20     HL TB Hip abd   btb x 20 BTB  20x  btb x 20  btb x 20  BTB  20x BTB  20x btb x 20     Clamshells     btb x 20 bilat btb x 20 B  BTB  20x B BTB  20x btb x 20     HL TA w roll  5s x 20 :05  20x          TG L10     20 x 2  20 x 2 20x2 20x2 20x 2      Step ups  F/L     8" x 20 ea         TRX squats        20x                  Ther Ex             bike  10'   10' 10'  10' 10' 10'     R SG in standing    10 x 2 10 x 2                     Press ups 10 x 2 10x 2 10x2 10 x 2 10 x 2 10x2 10x2 10x     Loc and sag 10 x 2  10x 2 10x2 10 x 2 10 x 2 10x2 10x2 10x                               B hss w/ strap 20s x 5 20s x 5 bilat :20  3x each 20s x 3 bilat 20s x bilat  :20  3x each :20  3x each 20s x 3 ea                   Left ankle tb x 4  As able rtb x 20  TB pf/inv aa df/ever 10x 2  RTB  20x each  PF/DF/INV/EV aa prn x 20 ea Pf, inv x 20 btb PF/INV  BTB  20x BTB  (all)  20x each                                Stand hip abd, hs curls, ext     Foam 2# x 20 Foam  2#  20x Foam  2#  20x Foam 2# x 20 Foam 2 5#    Squats @ bar      Foam x 20  Foam  20x Foam  20x foam x 20      prostretch      nv L  :20  3x L  :20  3x L  20s x 3                                                                       Ther Activity                                       Gait Training                                       Modalities

## 2023-02-15 ENCOUNTER — EVALUATION (OUTPATIENT)
Dept: PHYSICAL THERAPY | Facility: CLINIC | Age: 63
End: 2023-02-15

## 2023-02-15 DIAGNOSIS — M54.16 LUMBAR RADICULOPATHY, CHRONIC: Primary | ICD-10-CM

## 2023-02-15 DIAGNOSIS — M54.41 ACUTE BILATERAL LOW BACK PAIN WITH BILATERAL SCIATICA: ICD-10-CM

## 2023-02-15 DIAGNOSIS — M54.42 ACUTE BILATERAL LOW BACK PAIN WITH BILATERAL SCIATICA: ICD-10-CM

## 2023-02-15 DIAGNOSIS — M21.372 LEFT FOOT DROP: ICD-10-CM

## 2023-02-15 NOTE — PROGRESS NOTES
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 and suspects resultant constant increased right: hip flexor, quadriceps and lateral hip pain  SPR=3/10  Patient noted he is pleased with progress in therapy to date: "My foot is more stable now, I have less loss of balance and less foot slapping"   Patient also noted foot numbness is dissipating  Objective: See treatment diary below      Assessment: Tolerated treatment well  Patient demonstrated fatigue post treatment, exhibited good technique with therapeutic exercises and would benefit from continued PT     Plan: Continue per plan of care  Progress treatment as tolerated  Precautions: L4-5 radiculopathy, 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 )    [employer]ster[employer]  Access Code: [email or web address]        Manuals 1/16 1/19 1/25 1/27 2/1 2/3 2/8 2/10 2/15    FOTO db   db     LA  Score:  63/71                 HSS bilat             Left DF AAROM  stretch db LA  stretch db db LA LA db LA                 MRE;s left foot  10 x 2  10x2  LA  10 x 2  10x2 10x2 20x  20x  LA                 Neuro Re-Ed             Prone gs  3s x 20 :03  20x 3s x 20 3s x 20 :03  20x :03  20x 3s x 20  :03  20x    Prone hip ext  2s x 20 alt Alt  :02  20x Alt 2s x 20  Alt  20x Alt  20x Alt  2#  20x 2# x 20 2#  20x    Prone hs curls      2# 10 x 2 bilat B  2#  10x2 B  2#  10x2 2#  x20 R  L unable R 2#  L 0#  20x each    Prone hip ext w/ knee flexed     10 x 2  2#  10 x 2 bilat B  2#  10x2 B  2#  10x2  2# x 20  2#  20x    Bridging   3s x 20   :03  20x btb x 20  BTB  20x BTB  20x BTB  20x btb x 20 BTB  20x    HL TB Hip abd   btb x 20 BTB  20x  btb x 20  btb x 20  BTB  20x BTB  20x btb x 20 BTB  20x    Clamshells     btb x 20 bilat btb x 20  B  BTB  20x B BTB  20x btb x 20 BTB  20x    HL TA w roll  5s x 20 :05  20x          TG L10     20 x 2  20 x 2 20x2 20x2 20x 2  20x2    Step ups  F/L     8" x 20 ea     8" + foam  10x    TRX squats        20x 20x                 Ther Ex             bike  10'   10' 10'  10' 10' 10' 10'    R SG in standing    10 x 2 10 x 2                     Press ups 10 x 2 10x 2 10x2 10 x 2 10 x 2 10x2 10x2 10x 10x    Loc and sag 10 x 2  10x 2 10x2 10 x 2 10 x 2 10x2 10x2 10x 10x                              B hss w/ strap 20s x 5 20s x 5 bilat :20  3x each 20s x 3 bilat 20s x bilat  :20  3x each :20  3x each 20s x 3 ea  :20  3x each                 Left ankle tb x 4  As able rtb x 20  TB pf/inv aa df/ever 10x 2  RTB  20x each  PF/DF/INV/EV aa prn x 20 ea Pf, inv x 20 btb PF/INV  BTB  20x BTB  (all)  20x each                                Stand hip abd, hs curls, ext     Foam 2# x 20 Foam  2#  20x Foam  2#  20x Foam 2# x 20 Foam 2 5#  20x each    Squats @ bar      Foam x 20  Foam  20x Foam  20x foam x 20  Foam  20x    prostretch      nv L  :20  3x L  :20  3x L  20s x 3  L  :20  3x                                                                     Ther Activity                                       Gait Training                                       Modalities

## 2023-02-15 NOTE — PROGRESS NOTES
PT Re-Evaluation     Today's date: 2/15/2023  Patient name: Landen Randall  : 1960  MRN: [id number]  Referring provider: Lourdes Cherry PT  Dx:   Encounter Diagnosis     ICD-10-CM    1  Lumbar radiculopathy, chronic  M54 16       2  Left foot drop  M21 372       3  Acute bilateral low back pain with bilateral sciatica  M54 42     M54 41             Assessment  Assessment details: Patient has been making some progress towards original goals with improving strength/ flexibility    He is working which is an irritant causing some setback in progress the following day as he is working in awkward positions for prolonged periods of time in his job as an   Recommendation is to continue PT thru end of march to further his strength, functional movements and abilities  Impairments: abnormal gait, abnormal muscle firing, abnormal or restricted ROM, abnormal movement, activity intolerance, impaired physical strength, lacks appropriate home exercise program and pain with function  Understanding of Dx/Px/POC: good  Goals  STG   1  Patient will demonstrate independence and competence with HEP 2 -4 weeks   MET to date  2  Patient will report > 25-50% reduced pain/ weakness bilateral anterior thighs 2-4 weeks   Partially MET    LTG   1  Patient will report improvements with both functional and recreational abilities  4-6 weeks   Partially MET  2  Patient will demonstrate improved motor function  4-6 weeks  ONGOING  3  Reducing left foot drop  4-8 weeks  NOT MET  4  Improved flexibility bilateral HS  4-8 weeks     Partially met    Plan  Plan details: Patient response to treatment will be monitored each session and progressed accordingly    Thank you for this referral    Patient would benefit from: skilled physical therapy  Planned therapy interventions: IADL retraining, joint mobilization, manual therapy, patient education, postural training, strengthening, stretching, therapeutic exercise, flexibility, home exercise program, neuromuscular re-education, balance and gait training  Frequency: 2x week  Duration in weeks: 8  Treatment plan discussed with: patient        Subjective Evaluation    Pain  Current pain ratin  At worst pain ratin  Quality: dull ache and radiating  Progression: improved    Social Support  Lives with: spouse    Working: [employer] x 2 months - independent   Diagnostic Tests  CT scan: normal  Treatments  Current treatment: injection treatment  Patient Goals  Patient goals for therapy: increased strength, return to work, decreased pain and increased motion          Objective     Concurrent Complaints  Negative for night pain, disturbed sleep, bladder dysfunction, bowel dysfunction and saddle (S4) numbness    Postural Observations  Seated posture: fair  Standing posture: fair  Correction of posture: has no consistent effect        Palpation   Left   No palpable tenderness to the lumbar paraspinals  Right   No palpable tenderness to the lumbar paraspinals  Tenderness     Lumbar Spine  No tenderness in the spinous process  Left Hip   No tenderness in the PSIS  Right Hip   No tenderness in the PSIS       Neurological Testing     Sensation     Lumbar   Left   Intact: light touch    Right   Intact: light touch    Active Range of Motion     Lumbar   Flexion:  Restriction level: minimal  Extension:  Restriction level: minimal    Joint Play     Hypomobile: L1, L2, L3, L4, L5 and S1   Mechanical Assessment    Cervical      Thoracic      Lumbar    Standing flexion: repeated movements   Pain location:no change  Pain level: increased  Lying flexion: repeated movements  Pain location: no change  Standing extension: repeated movements  Pain location: no change  Lying extension: repeated movements  Pain location: no change    Strength/Myotome Testing     Left Hip   Planes of Motion   Flexion: 4  Extension: 4  Abduction: 4-  External rotation: 4  Internal rotation: 4    Right Hip   Planes of Motion   Flexion: 4+  Extension: 4+  External rotation: 4+  Internal rotation: 4+    Left Knee   Flexion: 5  Extension: 4+    Right Knee   Flexion: 5  Extension: 5    Left Ankle/Foot   Dorsiflexion: 2+  Plantar flexion: 3+  Inversion: 4-  Eversion: 3+  Great toe flexion: 3-  Great toe extension: 3-    Right Ankle/Foot   Dorsiflexion: 5  Plantar flexion: 5  Inversion: 5  Eversion: 5    Additional Strength Details  Unable to toe walk/ heel raise independently on left - same on reassement    Tests     Lumbar     Left   Negative crossed SLR, femoral stretch, passive SLR and slump test      Right   Negative crossed SLR, femoral stretch, passive SLR and slump test      Left Hip   Negative JUAN C and FADIR  Right Hip   Negative JUAN C and FADIR  Additional Tests Details  slr = 70 degrees bilaterally - hs tightness only elicited  - improved on reassessment     General Comments:      Lumbar Comments  GAIT - (+) left foot drop, decreased push off / tends to wb laterally on left side  - slight improvements only on reassessment              Precautions: L4-5 radiculopathy, 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

## 2023-02-17 ENCOUNTER — APPOINTMENT (OUTPATIENT)
Dept: PHYSICAL THERAPY | Facility: CLINIC | Age: 63
End: 2023-02-17

## 2023-02-21 ENCOUNTER — TELEPHONE (OUTPATIENT)
Dept: FAMILY MEDICINE CLINIC | Facility: CLINIC | Age: 63
End: 2023-02-21

## 2023-02-22 ENCOUNTER — OFFICE VISIT (OUTPATIENT)
Dept: PHYSICAL THERAPY | Facility: CLINIC | Age: 63
End: 2023-02-22

## 2023-02-22 ENCOUNTER — APPOINTMENT (OUTPATIENT)
Dept: LAB | Facility: HOSPITAL | Age: 63
End: 2023-02-22

## 2023-02-22 ENCOUNTER — OFFICE VISIT (OUTPATIENT)
Dept: PODIATRY | Facility: CLINIC | Age: 63
End: 2023-02-22

## 2023-02-22 VITALS
HEART RATE: 96 BPM | BODY MASS INDEX: 25.48 KG/M2 | DIASTOLIC BLOOD PRESSURE: 103 MMHG | HEIGHT: 69 IN | OXYGEN SATURATION: 97 % | WEIGHT: 172 LBS | SYSTOLIC BLOOD PRESSURE: 176 MMHG

## 2023-02-22 DIAGNOSIS — E78.2 MIXED HYPERLIPIDEMIA: ICD-10-CM

## 2023-02-22 DIAGNOSIS — M54.42 ACUTE BILATERAL LOW BACK PAIN WITH BILATERAL SCIATICA: ICD-10-CM

## 2023-02-22 DIAGNOSIS — G57.32 PERONEAL NEUROPATHY, LEFT: ICD-10-CM

## 2023-02-22 DIAGNOSIS — M21.372 LEFT FOOT DROP: ICD-10-CM

## 2023-02-22 DIAGNOSIS — M54.16 LUMBAR RADICULOPATHY, CHRONIC: Primary | ICD-10-CM

## 2023-02-22 DIAGNOSIS — M54.41 ACUTE BILATERAL LOW BACK PAIN WITH BILATERAL SCIATICA: ICD-10-CM

## 2023-02-22 DIAGNOSIS — M21.372 LEFT FOOT DROP: Primary | ICD-10-CM

## 2023-02-22 NOTE — PROGRESS NOTES
Daily Note     Today's date: 2023  Patient name: Marty Gaytan  : 1960  MRN: [id number]  Referring provider: Garrett Carter, PT  Dx:   Encounter Diagnosis     ICD-10-CM    1  Lumbar radiculopathy, chronic  M54 16       2  Left foot drop  M21 372       3  Acute bilateral low back pain with bilateral sciatica  M54 42     M54 41                      Subjective: "I am doing better, a lot better!"      Objective: See treatment diary below      Assessment: Tolerated treatment well  Patient demonstrated fatigue post treatment, exhibited good technique with therapeutic exercises and would benefit from continued PT      Plan: Continue per plan of care  Progress treatment as tolerated  Precautions: L4-5 radiculopathy, 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 )    [employer]  Access Code: [email or web address]        Manuals 1/16 1/19 1/25 1/27 2/1 2/3 2/8 2/10 2/15 2/22   FOTO db   db     LA  Score:  63/71                 HSS bilat             Left DF AAROM  stretch db LA  stretch db db LA LA db LA LA                MRE;s left foot  10 x 2  10x2  LA  10 x 2  10x2 10x2 20x  20x  LA 20x  LA                Neuro Re-Ed             Prone gs  3s x 20 :03  20x 3s x 20 3s x 20 :03  20x :03  20x 3s x 20  :03  20x :03  20x   Prone hip ext  2s x 20 alt Alt  :02  20x Alt 2s x 20  Alt  20x Alt  20x Alt  2#  20x 2# x 20 2#  20x 2#  20x   Prone hs curls      2# 10 x 2 bilat B  2#  10x2 B  2#  10x2 2#  x20 R  L unable R 2#  L 0#  20x each R 2#  L 0#  20x each   Prone hip ext w/ knee flexed     10 x 2  2#  10 x 2 bilat B  2#  10x2 B  2#  10x2  2# x 20  2#  20x 2#  20x   Bridging   3s x 20   :03  20x btb x 20  BTB  20x BTB  20x BTB  20x btb x 20 BTB  20x BTB  20x   HL TB Hip abd   btb x 20 BTB  20x  btb x 20  btb x 20  BTB  20x BTB  20x btb x 20 BTB  20x BTB  20x   Clamshells     btb x 20 bilat btb x 20  B  BTB  20x B BTB  20x btb x 20 BTB  20x BTB  20x   HL TA w roll  5s x 20 :05  20x          TG L10     20 x 2  20 x 2 20x2 20x2 20x 2  20x2 20x3   Step ups  F/L     8" x 20 ea     8" + foam  10x 8"+ foam  20x each   TRX squats        20x 20x 20x                Ther Ex             bike  10'   10' 10'  10' 10' 10' 10' 10'   R SG in standing    10 x 2 10 x 2                     Press ups 10 x 2 10x 2 10x2 10 x 2 10 x 2 10x2 10x2 10x 10x 10x   Loc and sag 10 x 2  10x 2 10x2 10 x 2 10 x 2 10x2 10x2 10x 10x 10x                             B hss w/ strap 20s x 5 20s x 5 bilat :20  3x each 20s x 3 bilat 20s x bilat  :20  3x each :20  3x each 20s x 3 ea  :20  3x each :20  3x each                Left ankle tb x 4  As able rtb x 20  TB pf/inv aa df/ever 10x 2  RTB  20x each  PF/DF/INV/EV aa prn x 20 ea Pf, inv x 20 btb PF/INV  BTB  20x BTB  (all)  20x each                                Stand hip abd, hs curls, ext     Foam 2# x 20 Foam  2#  20x Foam  2#  20x Foam 2# x 20 Foam 2 5#  20x each Foam  2 5#  20x each   Squats @ bar      Foam x 20  Foam  20x Foam  20x foam x 20  Foam  20x Foam  20x   prostretch      nv L  :20  3x L  :20  3x L  20s x 3  L  :20  3x L  :20  3x                                                                    Ther Activity                                       Gait Training                                       Modalities

## 2023-02-23 NOTE — PROGRESS NOTES
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  His right lower extremity seems to be asymptomatic at this point in time  His physical therapy notes were reviewed  He is currently scheduled to continue physical therapy through April 2023  I have reinforced the need to continue physical therapy and his home exercise program   Ultimately, if his symptoms worsen and his dropfoot progresses, he would need a ankle-foot orthosis for management  Interim, the patient would like to continue physical therapy and would like to maximize his strength and range of motion of the left lower extremity without the assistance of any braces or medical devices  As he is doing well, he will follow-up with me on an as-needed basis  Diagnoses and all orders for this visit:    Left foot drop    Peroneal neuropathy, left          Subjective:     Patient ID: Anabela Malik is a 58 y o  male  The patient presents today for follow-up of a left dropfoot deformity  He is currently going to physical therapy and feels he is making good progress  He notes good movement and function of his left ankle  He notes more stable gait as well as resolution of the sensation that he is slapping his left foot  He wears work boots to do his tesfaye work during the day and is able to perform his functions at work without undue difficulty        PAST MEDICAL HISTORY:  Past Medical History:   Diagnosis Date   • [facility] )    • Hyperlipidemia    • Sciatica    • Type 2 diabetes mellitus ([facility] )        PAST SURGICAL HISTORY:  History reviewed  No pertinent surgical history  ALLERGIES:  Patient has no known allergies  MEDICATIONS:  Current Outpatient Medications   Medication Sig Dispense Refill   • atorvastatin (LIPITOR) 20 mg tablet Take 1 tablet (20 mg total) by mouth every evening 90 tablet 1   • Blood Glucose Monitoring Suppl (OneTouch Verio Reflect) w/Device KIT Check blood sugars once daily  Please substitute with appropriate alternative as covered by patient's insurance  Dx: E11 65 1 kit 0   • gabapentin (NEURONTIN) 300 mg capsule Take 1 capsule (300 mg total) by mouth 3 (three) times a day 270 capsule 0   • glimepiride (AMARYL) 2 mg tablet TAKE ONE TABLET BY MOUTH EVERY DAY WITH BREAKFAST 90 tablet 0   • glucose blood (OneTouch Verio) test strip Check blood sugars once daily  Please substitute with appropriate alternative as covered by patient's insurance  Dx: E11 65 100 each 3   • lisinopril (ZESTRIL) 20 mg tablet Take 1 tablet (20 mg total) by mouth daily 90 tablet 1   • OneTouch Delica Lancets 63A MISC Check blood sugars once daily  Please substitute with appropriate alternative as covered by patient's insurance  Dx: E11 65 100 each 3     No current facility-administered medications for this visit  SOCIAL HISTORY:  Social History     Socioeconomic History   • Marital status: /Civil Union     Spouse name: None   • Number of children: None   • Years of education: None   • Highest education level: None   Occupational History   • None   Tobacco Use   • Smoking status: Some Days     Types: Cigars   • Smokeless tobacco: Never   • Tobacco comments:     Occasionally   Vaping Use   • Vaping Use: Never used   Substance and Sexual Activity   • Alcohol use:  Yes   • Drug use: Not Currently   • Sexual activity: None   Other Topics Concern   • None   Social History Narrative   • None     Social Determinants of Health     Financial Resource Strain: Not on file   Food Insecurity: Not on file   Transportation Needs: Not on file   Physical Activity: Not on file   Stress: Not on file   Social Connections: Not on file   Intimate Partner Violence: Not on file   Housing Stability: Not on file        Review of Systems   Constitutional: Negative  HENT: Negative  Eyes: Negative  Respiratory: Negative  Cardiovascular: Negative  Endocrine: Negative  Musculoskeletal: Negative  Skin: Negative  Neurological: Negative  Hematological: Negative  Psychiatric/Behavioral: Negative  Objective:     Physical Exam  Vitals reviewed  Constitutional:       Appearance: Normal appearance  HENT:      Head: Normocephalic and atraumatic  Nose: Nose normal    Eyes:      Conjunctiva/sclera: Conjunctivae normal       Pupils: Pupils are equal, round, and reactive to light  Cardiovascular:      Pulses:           Dorsalis pedis pulses are 2+ on the left side  Posterior tibial pulses are 2+ on the left side  Pulmonary:      Effort: Pulmonary effort is normal    Feet:      Comments: There is some slight improvement with active dorsiflexion of the left foot; there is no tenderness with palpation or range of motion; muscle power with plantarflexion and inversion and eversion is within normal limits  Skin:     General: Skin is warm  Capillary Refill: Capillary refill takes less than 2 seconds  Neurological:      General: No focal deficit present  Mental Status: He is alert and oriented to person, place, and time  Psychiatric:         Mood and Affect: Mood normal          Behavior: Behavior normal          Thought Content:  Thought content normal

## 2023-02-24 ENCOUNTER — APPOINTMENT (OUTPATIENT)
Dept: LAB | Facility: HOSPITAL | Age: 63
End: 2023-02-24

## 2023-02-24 ENCOUNTER — OFFICE VISIT (OUTPATIENT)
Dept: PHYSICAL THERAPY | Facility: CLINIC | Age: 63
End: 2023-02-24

## 2023-02-24 DIAGNOSIS — M54.42 ACUTE BILATERAL LOW BACK PAIN WITH BILATERAL SCIATICA: ICD-10-CM

## 2023-02-24 DIAGNOSIS — M54.16 LUMBAR RADICULOPATHY, CHRONIC: Primary | ICD-10-CM

## 2023-02-24 DIAGNOSIS — M54.41 ACUTE BILATERAL LOW BACK PAIN WITH BILATERAL SCIATICA: ICD-10-CM

## 2023-02-24 DIAGNOSIS — M21.372 LEFT FOOT DROP: ICD-10-CM

## 2023-02-24 LAB
ALBUMIN SERPL BCP-MCNC: 3.6 G/DL (ref 3.5–5)
ALP SERPL-CCNC: 68 U/L (ref 46–116)
ALT SERPL W P-5'-P-CCNC: 37 U/L (ref 12–78)
ANION GAP SERPL CALCULATED.3IONS-SCNC: 9 MMOL/L (ref 4–13)
AST SERPL W P-5'-P-CCNC: 28 U/L (ref 5–45)
BILIRUB SERPL-MCNC: 0.71 MG/DL (ref 0.2–1)
BUN SERPL-MCNC: 13 MG/DL (ref 5–25)
CALCIUM SERPL-MCNC: 9.2 MG/DL (ref 8.3–10.1)
CHLORIDE SERPL-SCNC: 101 MMOL/L (ref 96–108)
CHOLEST SERPL-MCNC: 153 MG/DL
CO2 SERPL-SCNC: 27 MMOL/L (ref 21–32)
CREAT SERPL-MCNC: 1.12 MG/DL (ref 0.6–1.3)
GFR SERPL CREATININE-BSD FRML MDRD: 70 ML/MIN/1.73SQ M
GLUCOSE P FAST SERPL-MCNC: 129 MG/DL (ref 65–99)
HDLC SERPL-MCNC: 66 MG/DL
LDLC SERPL CALC-MCNC: 78 MG/DL (ref 0–100)
NONHDLC SERPL-MCNC: 87 MG/DL
POTASSIUM SERPL-SCNC: 4.5 MMOL/L (ref 3.5–5.3)
PROT SERPL-MCNC: 6.9 G/DL (ref 6.4–8.4)
SODIUM SERPL-SCNC: 137 MMOL/L (ref 135–147)
TRIGL SERPL-MCNC: 46 MG/DL

## 2023-02-24 NOTE — PROGRESS NOTES
Daily Note     Today's date: 2023  Patient name: Ann Marie Blanco  : 1960  MRN: [id number]  Referring provider: Kingsley Dickey, PT  Dx:   Encounter Diagnosis     ICD-10-CM    1  Lumbar radiculopathy, chronic  M54 16       2  Left foot drop  M21 372       3  Acute bilateral low back pain with bilateral sciatica  M54 42     M54 41                      Subjective: patient reports that he worked again yesterday and tweaked his upper back   Notes that otherwise he is feeling stronger  Objective: See treatment diary below      Assessment: Tolerated treatment fair  Patient demonstrated fatigue post treatment, exhibited good technique with therapeutic exercises and would benefit from continued PT  Left DF weakness continues  Plan: Continue per plan of care  Progress treatment as tolerated  Precautions: L4-5 radiculopathy, 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 )    stlu"[employer]"pt "[employer]  Access Code: [email or web address]        Manuals 2/24     2/3 2/8 2/10 2/15 2/22   FOTO         LA  Score:  63/71                 HSS bilat             Left DF AAROM db     LA LA db LA LA                MRE;s left foot db     10x2 10x2 20x  20x  LA 20x  LA                Neuro Re-Ed             Prone gs 3s x 20     :03  20x :03  20x 3s x 20  :03  20x :03  20x   Prone hip ext 2# R 0# L x 20      Alt  20x Alt  2#  20x 2# x 20 2#  20x 2#  20x   Prone hs curls  2# R0# L x 20 ea      B  2#  10x2 B  2#  10x2 2#  x20 R  L unable R 2#  L 0#  20x each R 2#  L 0#  20x each   Prone hip ext w/ knee flexed  0# L 2 5# R  X 20 ea     B  2#  10x2 B  2#  10x2  2# x 20  2#  20x 2#  20x   Bridging  btb x 20     BTB  20x BTB  20x btb x 20 BTB  20x BTB  20x   HL TB Hip abd  btb x 20      BTB  20x BTB  20x btb x 20 BTB  20x BTB  20x   Clamshells  btb x 20     B  BTB  20x B BTB  20x btb x 20 BTB  20x BTB  20x                TG L10  20 x 3     20x2 20x2 20x 2  20x2 20x3   Step ups  F/L 8" + foam x 20        8" + foam  10x 8"+ foam  20x each   TRX squats 20x        20x 20x 20x                Ther Ex             bike 10'      10' 10' 10' 10' 10'                             Press ups 10x      10x2 10x2 10x 10x 10x   Loc and sag 10x     10x2 10x2 10x 10x 10x                             B hss w/ strap 20s x 3      :20  3x each :20  3x each 20s x 3 ea  :20  3x each :20  3x each                Left ankle tb x 4       PF/INV  BTB  20x BTB  (all)  20x each                                Stand hip abd, hs curls, ext Foam 2 5# x 20     Foam  2#  20x Foam  2#  20x Foam 2# x 20 Foam 2 5#  20x each Foam  2 5#  20x each   Squats @ bar  20x foam     Foam  20x Foam  20x foam x 20  Foam  20x Foam  20x   prostretch  20s x 3 L     L  :20  3x L  :20  3x L  20s x 3  L  :20  3x L  :20  3x                                                                    Ther Activity                                       Gait Training                                       Modalities

## 2023-02-27 ENCOUNTER — OFFICE VISIT (OUTPATIENT)
Dept: DERMATOLOGY | Facility: CLINIC | Age: 63
End: 2023-02-27

## 2023-02-27 VITALS — WEIGHT: 172 LBS | HEIGHT: 69 IN | BODY MASS INDEX: 25.48 KG/M2

## 2023-02-27 DIAGNOSIS — D22.9 NEVUS: Primary | ICD-10-CM

## 2023-02-27 DIAGNOSIS — L82.1 SEBORRHEIC KERATOSIS: ICD-10-CM

## 2023-02-27 DIAGNOSIS — Z13.89 SCREENING FOR SKIN CONDITION: ICD-10-CM

## 2023-02-27 NOTE — PROGRESS NOTES
[facility] Note     Patient Name: Matt Camilo  Encounter Date: February 27, 2023     Have you been cared for by a [place] [address] Dermatologist in the last 3 years and, if so, which description applies to you? NO  I am considered a "new" patient and must complete all patient intake questions  I am MALE/not capable of bearing children  REVIEW OF SYSTEMS:  Have you recently had or currently have any of the following? · Recent fever or chills? No  · Any non-healing wound? No   PAST MEDICAL HISTORY:  Have you personally ever had or currently have any of the following? If "YES," then please provide more detail  · Skin cancer (such as Melanoma, Basal Cell Carcinoma, Squamous Cell Carcinoma? No  · Tuberculosis, HIV/AIDS, Hepatitis B or C: No  · Systemic Immunosuppression such as Diabetes, Biologic or Immunotherapy, Chemotherapy, Organ Transplantation, Bone Marrow Transplantation YES, Diabetes  · Radiation Treatment No   FAMILY HISTORY:  Any "first degree relatives" (parent, brother, sister, or child) with the following? • Skin Cancer, Pancreatic or Other Cancer? YES, Father - Prostate Cancer   PATIENT EXPERIENCE:    • Do you want the Dermatologist to perform a COMPLETE skin exam today including a clinical examination under the "bra and underwear" areas? Yes  • If necessary, do we have your permission to call and leave a detailed message on your Preferred Phone number that includes your specific medical information? Yes      No Known Allergies   Current Outpatient Medications:   •  atorvastatin (LIPITOR) 20 mg tablet, Take 1 tablet (20 mg total) by mouth every evening, Disp: 90 tablet, Rfl: 1  •  Blood Glucose Monitoring Suppl (OneTouch Verio Reflect) w/Device KIT, Check blood sugars once daily  Please substitute with appropriate alternative as covered by patient's insurance   Dx: E11 65, Disp: 1 kit, Rfl: 0  •  gabapentin (NEURONTIN) 300 mg capsule, Take 1 capsule (300 mg total) by mouth 3 (three) times a day, Disp: 270 capsule, Rfl: 0  •  glimepiride (AMARYL) 2 mg tablet, TAKE ONE TABLET BY MOUTH EVERY DAY WITH BREAKFAST, Disp: 90 tablet, Rfl: 0  •  glucose blood (OneTouch Verio) test strip, Check blood sugars once daily  Please substitute with appropriate alternative as covered by patient's insurance  Dx: E11 65, Disp: 100 each, Rfl: 3  •  lisinopril (ZESTRIL) 20 mg tablet, Take 1 tablet (20 mg total) by mouth daily, Disp: 90 tablet, Rfl: 1  •  OneTouch Delica Lancets 24M MISC, Check blood sugars once daily  Please substitute with appropriate alternative as covered by patient's insurance  Dx: E11 65, Disp: 100 each, Rfl: 3          • Whom besides the patient is providing clinical information about today's encounter?    NO ADDITIONAL HISTORIAN (patient alone provided history)    Physical Exam and Assessment/Plan by Diagnosis:

## 2023-02-27 NOTE — PATIENT INSTRUCTIONS
Nevi reviewed the concept of ABCDE and ugly duckling nothing markedly atypical patient reassured  Seborrheic Keratosis  Patient reasurred these are normal growths we acquire with age no treatment needed    Screening for Dermatologic Disorders: Nothing else of concern noted on complete exam follow up in 2 year

## 2023-02-27 NOTE — PROGRESS NOTES
[address]  [address] [facility] [address] [place] [address]  [phone]  [phone]     MRN: [id number] : 1960  Encounter: [id number]  Patient Information: Wesley Araujo  Chief complaint: skin cancer check up    History of present illness: 70-year-old male presents for overall skin check concerns regarding moles  Daughter was concerned regarding a mole on his lower back no specific concerns regarding a recent change  Past Medical History:   Diagnosis Date   • Diabetes mellitus ([facility] )    • Hyperlipidemia    • Sciatica    • Type 2 diabetes mellitus ([facility] )      No past surgical history on file  Social History   Social History     Substance and Sexual Activity   Alcohol Use Yes     Social History     Substance and Sexual Activity   Drug Use Not Currently     Social History     Tobacco Use   Smoking Status Some Days   • Types: Cigars   Smokeless Tobacco Never   Tobacco Comments    Occasionally     Family History   Problem Relation Age of Onset   • Diabetes Mother    • Alzheimer's disease Mother    • Dementia Father    • Prostate cancer Father    • Hypertension Father    • Hyperlipidemia Father      Meds/Allergies   No Known Allergies    Meds:  Prior to Admission medications    Medication Sig Start Date End Date Taking? Authorizing Provider   atorvastatin (LIPITOR) 20 mg tablet Take 1 tablet (20 mg total) by mouth every evening 10/19/22 4/17/23 Yes YESENIA Elmore   Blood Glucose Monitoring Suppl (OneTouch Verio Reflect) w/Device KIT Check blood sugars once daily  Please substitute with appropriate alternative as covered by patient's insurance   Dx: E11 65 10/19/22  Yes YESENIA Elmore   gabapentin (NEURONTIN) 300 mg capsule Take 1 capsule (300 mg total) by mouth 3 (three) times a day 23  Yes Stormy Pike MD   glimepiride (AMARYL) 2 mg tablet TAKE ONE TABLET BY MOUTH EVERY DAY WITH BREAKFAST 23  Yes YESENIA Elmore   glucose blood (OneTouch Verio) test strip Check blood sugars once daily  Please substitute with appropriate alternative as covered by patient's insurance  Dx: E11 65 2/8/23  Yes YESENIA Elmore   lisinopril (ZESTRIL) 20 mg tablet Take 1 tablet (20 mg total) by mouth daily 2/8/23  Yes YESENIA Elmore   OneTouch Delica Lancets 08K MISC Check blood sugars once daily  Please substitute with appropriate alternative as covered by patient's insurance   Dx: E11 65 2/8/23  Yes YESENIA Elmore       Subjective:     Review of Systems:    General: negative for - chills, fatigue, fever,  weight gain or weight loss  Psychological: negative for - anxiety, behavioral disorder, concentration difficulties, decreased libido, depression, irritability, memory difficulties, mood swings, sleep disturbances or suicidal ideation  ENT: negative for - hearing difficulties , nasal congestion, nasal discharge, oral lesions, sinus pain, sneezing, sore throat  Allergy and Immunology: negative for - hives, insect bite sensitivity,  Hematological and Lymphatic: negative for - bleeding problems, blood clots,bruising, swollen lymph nodes  Endocrine: negative for - hair pattern changes, hot flashes, malaise/lethargy, mood swings, palpitations, polydipsia/polyuria, skin changes, temperature intolerance or unexpected weight change  Respiratory: negative for - cough, hemoptysis, orthopnea, shortness of breath, or wheezing  Cardiovascular: negative for - chest pain, dyspnea on exertion, edema,  Gastrointestinal: negative for - abdominal pain, nausea/vomiting  Genito-Urinary: negative for - dysuria, incontinence, irregular/heavy menses or urinary frequency/urgency  Musculoskeletal: negative for - gait disturbance, joint pain, joint stiffness, joint swelling, muscle pain, muscular weakness  Dermatological:  As in HPI  Neurological: negative for confusion, dizziness, headaches, impaired coordination/balance, memory loss, numbness/tingling, seizures, speech problems, tremors or weakness       Objective:   Ht 5' 9" (1 753 m)   Wt 78 kg (172 lb)   BMI 25 40 kg/m²     Physical Exam:    General Appearance:    Alert, cooperative, no distress   Head:    Normocephalic, without obvious abnormality, atraumatic           Skin:   A full skin exam was performed including scalp, head scalp, eyes, ears, nose, lips, neck, chest, axilla, abdomen, back, buttocks, bilateral upper extremities, bilateral lower extremities, hands, feet, fingers, toes, fingernails, and toenails 5 mm pigmented macule noted on the left lower back with some hyperpigmentation noted in the periphery and no irregularity noted on dermoscopy normal keratotic papules with greasy stuck on appearance nothing else remarkable noted on complete exam           Assessment:     1  Nevus        2  Seborrheic keratosis        3  Screening for skin condition              Plan:   Nevi reviewed the concept of ABCDE and ugly duckling nothing markedly atypical patient reassured  Seborrheic Keratosis  Patient reasurred these are normal growths we acquire with age no treatment needed  Screening for Dermatologic Disorders: Nothing else of concern noted on complete exam follow up in 2 year       Jarrett Malik MD  2/27/2023,9:19 AM    Portions of the record may have been created with voice recognition software   Occasional wrong word or "sound a like" substitutions may have occurred due to the inherent limitations of voice recognition software   Read the chart carefully and recognize, using context, where substitutions have occurred

## 2023-03-01 ENCOUNTER — OFFICE VISIT (OUTPATIENT)
Dept: PHYSICAL THERAPY | Facility: CLINIC | Age: 63
End: 2023-03-01

## 2023-03-01 DIAGNOSIS — M54.41 ACUTE BILATERAL LOW BACK PAIN WITH BILATERAL SCIATICA: ICD-10-CM

## 2023-03-01 DIAGNOSIS — M54.42 ACUTE BILATERAL LOW BACK PAIN WITH BILATERAL SCIATICA: ICD-10-CM

## 2023-03-01 DIAGNOSIS — M54.16 LUMBAR RADICULOPATHY, CHRONIC: Primary | ICD-10-CM

## 2023-03-01 DIAGNOSIS — M21.372 LEFT FOOT DROP: ICD-10-CM

## 2023-03-01 NOTE — PROGRESS NOTES
Daily Note     Today's date: 3/1/2023  Patient name: Anitha Weems  : 1960  MRN: [id number]  Referring provider: Chata Barrett, PT  Dx:   Encounter Diagnosis     ICD-10-CM    1  Lumbar radiculopathy, chronic  M54 16       2  Acute bilateral low back pain with bilateral sciatica  M54 42     M54 41       3  Left foot drop  M21 372                      Subjective: patient notes that his left HS remains very weak    States that he has not worked for a couple of days - feeling ok upon arrival today   Notes that he is stronger climbing up/ down steps at home - gaining confidence as well  Feels that his left df movement is improved as well  Objective: See treatment diary below      Assessment: Tolerated treatment fair  Patient demonstrated fatigue post treatment, exhibited good technique with therapeutic exercises and would benefit from continued PT  Left DF weakness continues  Plan: Continue per plan of care  Progress treatment as tolerated  Precautions: L4-5 radiculopathy, 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 )    [employer]  Access Code: [email or web address]        Manuals 2/24 3/1    2/3 2/8 2/10 2/15 2/22   FOTO         LA  Score:  63/71                 HSS bilat             Left DF AAROM db db    LA LA db LA LA                MRE;s left foot db db    10x2 10x2 20x  20x  LA 20x  LA                Neuro Re-Ed             Prone gs 3s x 20 3s x 20    :03  20x :03  20x 3s x 20  :03  20x :03  20x   Prone hip ext 2# R 0# L x 20  2 5# R 0# L x 20 ea    Alt  20x Alt  2#  20x 2# x 20 2#  20x 2#  20x   Prone hs curls  2# R0# L x 20 ea  2 5# R  0# L x 20     B  2#  10x2 B  2#  10x2 2#  x20 R  L unable R 2#  L 0#  20x each R 2#  L 0#  20x each   Prone hip ext w/ knee flexed  0# L 2 5# R  X 20 ea 2 5# R 0# L x 20 ea     B  2#  10x2 B  2#  10x2  2# x 20  2#  20x 2#  20x   Bridging  btb x 20 btb x 20    BTB  20x BTB  20x btb x 20 BTB  20x BTB  20x   HL TB Hip abd  btb x 20  btb x 20 BTB  20x BTB  20x btb x 20 BTB  20x BTB  20x   Clamshells  btb x 20 btb x 20     B  BTB  20x B BTB  20x btb x 20 BTB  20x BTB  20x                TG L10  20 x 3 20 x 3    20x2 20x2 20x 2  20x2 20x3   Step ups  F/L 8" + foam x 20 8" + Foam x 20 ea       8" + foam  10x 8"+ foam  20x each   TRX squats 20x  20x       20x 20x 20x                Ther Ex             bike 10'  10'     10' 10' 10' 10' 10'                             Press ups 10x  10 x    10x2 10x2 10x 10x 10x   Loc and sag 10x 10 x    10x2 10x2 10x 10x 10x                             B hss w/ strap 20s x 3  20s x 3     :20  3x each :20  3x each 20s x 3 ea  :20  3x each :20  3x each                Left ankle tb x 4       PF/INV  BTB  20x BTB  (all)  20x each                                Stand hip abd, hs curls, ext Foam 2 5# x 20 Foam 2 5# x 20     Foam  2#  20x Foam  2#  20x Foam 2# x 20 Foam 2 5#  20x each Foam  2 5#  20x each   Squats @ bar  20x foam 20x  Foam     Foam  20x Foam  20x foam x 20  Foam  20x Foam  20x   prostretch  20s x 3 L 20s x 3 L     L  :20  3x L  :20  3x L  20s x 3  L  :20  3x L  :20  3x                                                                    Ther Activity                                       Gait Training                                       Modalities

## 2023-03-03 ENCOUNTER — EVALUATION (OUTPATIENT)
Dept: PHYSICAL THERAPY | Facility: CLINIC | Age: 63
End: 2023-03-03

## 2023-03-03 DIAGNOSIS — M54.41 ACUTE BILATERAL LOW BACK PAIN WITH BILATERAL SCIATICA: ICD-10-CM

## 2023-03-03 DIAGNOSIS — M54.42 ACUTE BILATERAL LOW BACK PAIN WITH BILATERAL SCIATICA: ICD-10-CM

## 2023-03-03 DIAGNOSIS — M21.372 LEFT FOOT DROP: ICD-10-CM

## 2023-03-03 DIAGNOSIS — M54.16 LUMBAR RADICULOPATHY, CHRONIC: Primary | ICD-10-CM

## 2023-03-03 NOTE — PROGRESS NOTES
Daily Note     Today's date: 3/3/2023  Patient name: Yessy Barker  : 1960  MRN: [id number]  Referring provider: Lorie Singh, PT  Dx:   Encounter Diagnosis     ICD-10-CM    1  Lumbar radiculopathy, chronic  M54 16       2  Acute bilateral low back pain with bilateral sciatica  M54 42     M54 41       3  Left foot drop  M21 372                      Subjective: Patient noted abdominal-> lower quadrant bilateral discomfort anterior and posteriorly over the past 5 days which improved with activity/worse at rest   Patient denied fever; noted usual general fatigue  Objective: See treatment diary below      Assessment: Tolerated treatment well  Weakness still evident of left LE  Patient noted decreased abdominal/LBP with abdominal bracing  Patient demonstrated fatigue post treatment and would benefit from continued PT      Plan: Continue per plan of care  Progress treatment as tolerated  Advised MD/ER consult if abdominal symptoms do not improve  Precautions: L4-5 radiculopathy, 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 )    [employer]  Access Code: [email or web address]        Manuals 2/24 3/1 3/3   2/3 2/8 2/10 2/15 2/22   FOTO         LA  Score:  63/71                 HSS bilat             Left DF AAROM db db LA   LA LA db LA LA                MRE;s left foot db db LA   10x2 10x2 20x  20x  LA 20x  LA                Neuro Re-Ed             Prone gs 3s x 20 3s x 20 :03  20x   :03  20x :03  20x 3s x 20  :03  20x :03  20x   Prone hip ext 2# R 0# L x 20  2 5# R 0# L x 20 ea R 3#  L 0#  20x    Alt  20x Alt  2#  20x 2# x 20 2#  20x 2#  20x   Prone hs curls  2# R0# L x 20 ea  2 5# R  0# L x 20  R 3#  L 0#  20x   B  2#  10x2 B  2#  10x2 2#  x20 R  L unable R 2#  L 0#  20x each R 2#  L 0#  20x each   Prone hip ext w/ knee flexed  0# L 2 5# R  X 20 ea 2 5# R 0# L x 20 ea  R 3#  L 0#  20x each     B  2#  10x2 B  2#  10x2  2# x 20  2#  20x 2#  20x   Bridging  btb x 20 btb x 20 BTB  20x   BTB  20x BTB  20x btb x 20 BTB  20x BTB  20x   HL TB Hip abd  btb x 20  btb x 20  BTB  20x   BTB  20x BTB  20x btb x 20 BTB  20x BTB  20x   Clamshells  btb x 20 btb x 20  BTB  20x each   B  BTB  20x B BTB  20x btb x 20 BTB  20x BTB  20x                TG L10  20 x 3 20 x 3 20x3   20x2 20x2 20x 2  20x2 20x3   Step ups  F/L 8" + foam x 20 8" + Foam x 20 ea 8"+  Foam  20x each      8" + foam  10x 8"+ foam  20x each   TRX squats 20x  20x  20x     20x 20x 20x                Ther Ex             bike 10'  10'  10'   10' 10' 10' 10' 10'                             Press ups 10x  10 x 10x   10x2 10x2 10x 10x 10x   Loc and sag 10x 10 x 10x   10x2 10x2 10x 10x 10x                             B hss w/ strap 20s x 3  20s x 3  :20  3x   :20  3x each :20  3x each 20s x 3 ea  :20  3x each :20  3x each                Left ankle tb x 4       PF/INV  BTB  20x BTB  (all)  20x each                                Stand hip abd, hs curls, ext Foam 2 5# x 20 Foam 2 5# x 20  Foam  3#  20x    Foam  2#  20x Foam  2#  20x Foam 2# x 20 Foam 2 5#  20x each Foam  2 5#  20x each   Squats @ bar  20x foam 20x  Foam  (see TRX)   Foam  20x Foam  20x foam x 20  Foam  20x Foam  20x   prostretch  20s x 3 L 20s x 3 L  :20  3x  L   L  :20  3x L  :20  3x L  20s x 3  L  :20  3x L  :20  3x                                                                    Ther Activity                                       Gait Training                                       Modalities

## 2023-03-08 ENCOUNTER — OFFICE VISIT (OUTPATIENT)
Dept: PHYSICAL THERAPY | Facility: CLINIC | Age: 63
End: 2023-03-08

## 2023-03-08 ENCOUNTER — OFFICE VISIT (OUTPATIENT)
Dept: FAMILY MEDICINE CLINIC | Facility: CLINIC | Age: 63
End: 2023-03-08

## 2023-03-08 VITALS
WEIGHT: 171.2 LBS | TEMPERATURE: 96.2 F | HEART RATE: 93 BPM | HEIGHT: 69 IN | BODY MASS INDEX: 25.36 KG/M2 | SYSTOLIC BLOOD PRESSURE: 142 MMHG | OXYGEN SATURATION: 99 % | DIASTOLIC BLOOD PRESSURE: 88 MMHG

## 2023-03-08 DIAGNOSIS — M54.16 LUMBAR RADICULOPATHY, CHRONIC: Primary | ICD-10-CM

## 2023-03-08 DIAGNOSIS — I10 PRIMARY HYPERTENSION: Primary | ICD-10-CM

## 2023-03-08 DIAGNOSIS — M54.41 ACUTE BILATERAL LOW BACK PAIN WITH BILATERAL SCIATICA: ICD-10-CM

## 2023-03-08 DIAGNOSIS — M21.372 LEFT FOOT DROP: ICD-10-CM

## 2023-03-08 DIAGNOSIS — Z12.11 SCREENING FOR COLON CANCER: ICD-10-CM

## 2023-03-08 DIAGNOSIS — E78.2 MIXED HYPERLIPIDEMIA: ICD-10-CM

## 2023-03-08 DIAGNOSIS — M54.42 ACUTE BILATERAL LOW BACK PAIN WITH BILATERAL SCIATICA: ICD-10-CM

## 2023-03-08 RX ORDER — LISINOPRIL 30 MG/1
30 TABLET ORAL DAILY
Qty: 30 TABLET | Refills: 0 | Status: SHIPPED | OUTPATIENT
Start: 2023-03-08

## 2023-03-08 NOTE — PROGRESS NOTES
Daily Note     Today's date: 3/8/2023  Patient name: Tanya Curran  : 1960  MRN: [id number]  Referring provider: Chito Munoz PT  Dx:   Encounter Diagnosis     ICD-10-CM    1  Lumbar radiculopathy, chronic  M54 16       2  Acute bilateral low back pain with bilateral sciatica  M54 42     M54 41       3  Left foot drop  M21 372                      Subjective: Patient stated he saw MD and bloodwork and exam indicated "everything is fine"  Patient noted if his abdominal symptoms persist or worsen he will f/u with MD   Patient reported ocmpliance with current HEP and stated he is pleased with progress to date  Objective: See treatment diary below      Assessment: Tolerated treatment well  Patient demonstrated fatigue post treatment and would benefit from continued PT      Plan: Continue per plan of care  Progress treatment as tolerated  Precautions: L4-5 radiculopathy, 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 )    [email or web address]  Access Code: [email or web address]        Manuals 2/24 3/1 3/3 3/8     2/15 2/22   FOTO         LA  Score:  63/71                 HSS bilat             Left DF AAROM db db LA LA     LA LA                MRE;s left foot db db LA LA     20x  LA 20x  LA                Neuro Re-Ed             Prone gs 3s x 20 3s x 20 :03  20x :03  20x     :03  20x :03  20x   Prone hip ext 2# R 0# L x 20  2 5# R 0# L x 20 ea R 3#  L 0#  20x  R 3#  L 0#  20x  each     2#  20x 2#  20x   Prone hs curls  2# R0# L x 20 ea  2 5# R  0# L x 20  R 3#  L 0#  20x R 3#  L 0#  20x  each     R 2#  L 0#  20x each R 2#  L 0#  20x each   Prone hip ext w/ knee flexed  0# L 2 5# R  X 20 ea 2 5# R 0# L x 20 ea  R 3#  L 0#  20x each   R 3#  L 0#  20x each     2#  20x 2#  20x   Bridging  btb x 20 btb x 20 BTB  20x BTB  20x     BTB  20x BTB  20x   HL TB Hip abd  btb x 20  btb x 20  BTB  20x BTB  20x     BTB  20x BTB  20x   Clamshells  btb x 20 btb x 20  BTB  20x each BTB  20x   each     BTB  20x BTB  20x TG L10  20 x 3 20 x 3 20x3 20x3     20x2 20x3   Step ups  F/L 8" + foam x 20 8" + Foam x 20 ea 8"+  Foam  20x each 8"+ foam  20x each     8" + foam  10x 8"+ foam  20x each   TRX squats 20x  20x  20x 20x     20x 20x                Ther Ex             bike 10'  10'  10' 10'     10' 10'                             Press ups 10x  10 x 10x 10x     10x 10x   Loc and sag 10x 10 x 10x 10x     10x 10x                             B hss w/ strap 20s x 3  20s x 3  :20  3x :20  3x each     :20  3x each :20  3x each                Left ankle tb x 4                                        Stand hip abd, hs curls, ext Foam 2 5# x 20 Foam 2 5# x 20  Foam  3#  20x  B  Foam  3#  20x     Foam 2 5#  20x each Foam  2 5#  20x each   Squats @ bar  20x foam 20x  Foam  (see TRX)      Foam  20x Foam  20x   prostretch  20s x 3 L 20s x 3 L  :20  3x  L L  :20  3x     L  :20  3x L  :20  3x                                                                    Ther Activity                                       Gait Training                                       Modalities

## 2023-03-08 NOTE — PATIENT INSTRUCTIONS
Heart Healthy Diet   AMBULATORY CARE:   A heart healthy diet  is an eating plan low in unhealthy fats and sodium (salt)  The plan is high in healthy fats and fiber  A heart healthy diet helps improve your cholesterol levels and lowers your risk for heart disease and stroke  A dietitian will teach you how to read and understand food labels  Heart healthy diet guidelines to follow:   Choose foods that contain healthy fats:      Unsaturated fats  include monounsaturated and polyunsaturated fats  Unsaturated fat is found in foods such as soybean, canola, olive, corn, and safflower oils  It is also found in soft tub margarine that is made with liquid vegetable oil  Omega-3 fat  is found in certain fish, such as salmon, tuna, and trout, and in walnuts and flaxseed  Eat fish high in omega-3 fats at least 2 times a week  Limit or do not have unhealthy fats:      Cholesterol  is found in animal foods, such as eggs and lobster, and in dairy products made from whole milk  Limit cholesterol to less than 200 mg each day  Saturated fat  is found in meats, such as meyer and hamburger  It is also found in chicken or turkey skin, whole milk, and butter  Limit saturated fat to less than 7% of your total daily calories  Trans fat  is found in packaged foods, such as potato chips and cookies  It is also in hard margarine, some fried foods, and shortening  Do not eat foods that contain trans fats  Get 20 to 30 grams of fiber each day  Fruits, vegetables, whole-grain foods, and legumes (cooked beans) are good sources of fiber  Limit sodium as directed  You may be told to limit sodium, such as to 2,000 mg or less each day  Choose low-sodium or no-salt-added foods  Add little or no salt to food you prepare  Use herbs and spices in place of salt         Include the following in your heart healthy plan:  Ask your dietitian or healthcare provider how many servings to have each day from the following food groups:  Grains:      Whole-wheat breads, cereals, and pastas, and brown rice    Low-fat, low-sodium crackers and chips    Vegetables:      Broccoli, green beans, green peas, and spinach    Collards, kale, and lima beans    Carrots, sweet potatoes, tomatoes, and peppers    Canned vegetables with no salt added    Fruits:      Bananas, peaches, pears, and pineapple    Grapes, raisins, and dates    Oranges, tangerines, grapefruit, orange juice, and grapefruit juice    Apricots, mangoes, melons, and papaya    Raspberries and strawberries    Canned fruit with no added sugar    Low-fat dairy:      Nonfat (skim) milk, 1% milk, and low-fat almond, cashew, or soy milks fortified with calcium    Low-fat cheese, regular or frozen yogurt, and cottage cheese    Meats and proteins:      Lean cuts of beef and pork (loin, leg, round), skinless chicken and turkey    Legumes, soy products, egg whites, or nuts    Limit or do not include the following in your heart healthy plan:   Foods and liquids that contain unhealthy fats and oils:      Whole or 2% milk, cream cheese, sour cream, or cheese    High-fat cuts of beef (T-bone steaks, ribs), chicken or turkey with skin, and organ meats such as liver    Butter, stick margarine, shortening, and cooking oils such as coconut or palm oil    Foods and liquids high in sodium:      Packaged foods, such as frozen dinners, cookies, macaroni and cheese, and cereals with more than 300 mg of sodium per serving    Vegetables with added sodium, such as instant potatoes, vegetables with added sauces, or regular canned vegetables    Cured or smoked meats, such as hot dogs, meyer, and sausage    High-sodium ketchup, barbecue sauce, salad dressing, pickles, olives, soy sauce, or miso    Foods and liquids high in sugar:      Candy, cake, cookies, pies, or doughnuts    Soft drinks (soda), sports drinks, or sweetened tea    Canned or dry mixes for cakes, soups, sauces, or gravies    Other healthy heart guidelines:   Do not smoke  Nicotine and other chemicals in cigarettes and cigars can cause lung and heart damage  Ask your healthcare provider for information if you currently smoke and need help to quit  E-cigarettes or smokeless tobacco still contain nicotine  Talk to your provider before you use these products  Limit or do not drink alcohol as directed  Alcohol can damage your heart and raise your blood pressure  Your healthcare provider may give you specific daily and weekly limits  The general recommended limit is 1 drink a day for women 21 or older and for men 72 or older  Do not have more than 3 drinks within 24 hours or 7 within a week  The recommended limit is 2 drinks a day for men 24to 59years of age  Do not have more than 4 drinks within 24 hours or 14 within a week  A drink of alcohol is 12 ounces of beer, 5 ounces of wine, or 1½ ounces of liquor  Maintain a healthy weight  Extra body weight makes your heart work harder  Ask your provider what a healthy weight is for you  He or she can help you create a safe weight loss plan, if needed  Exercise regularly  Exercise can help you maintain a healthy weight and improve your blood pressure and cholesterol levels  Regular exercise can also decrease your risk for heart problems  Ask your provider about the best exercise plan for you  Do not start an exercise program without asking your provider  Follow up with your doctor or cardiologist as directed:  Write down your questions so you remember to ask them during your visits  © Copyright [employer] 2022 Information is for End User's use only and may not be sold, redistributed or otherwise used for commercial purposes  The above information is an  only  It is not intended as medical advice for individual conditions or treatments  Talk to your doctor, nurse or pharmacist before following any medical regimen to see if it is safe and effective for you

## 2023-03-08 NOTE — ASSESSMENT & PLAN NOTE
Blood pressure better this visit, but still with not goal   Patient states blood pressures at home have been 150s over 80s  Patient notes compliance with medication  Continues with dietary modifications  At this time will increase lisinopril to 30 mg  Encouraged to continue with diet and lifestyle modifications  We will reevaluate in 4 weeks

## 2023-03-08 NOTE — PROGRESS NOTES
Name: Yessy Barker      : 1960      MRN: [id number]  Encounter Provider: YESENIA Garcia  Encounter Date: 3/8/2023   Encounter department: [place] [address]  Primary hypertension  Assessment & Plan:  Blood pressure better this visit, but still with not goal   Patient states blood pressures at home have been 150s over 80s  Patient notes compliance with medication  Continues with dietary modifications  At this time will increase lisinopril to 30 mg  Encouraged to continue with diet and lifestyle modifications  We will reevaluate in 4 weeks  Orders:  -     lisinopril (ZESTRIL) 30 mg tablet; Take 1 tablet (30 mg total) by mouth daily    2  Mixed hyperlipidemia  Assessment & Plan:  Recent lipid panel reviewed with patient  To continue on current dose of statin  3  Screening for colon cancer  Comments:  Has consultation appointment on 3/15  Subjective      Patient presents to the office for 4-week check-up  Lisinopril increased to 20 mg during last visit  Patient continues with dietary modifications  Continues with physical therapy as well for back and foot drop  Patient denies symptoms related to high blood pressure  Patient states he takes his blood pressure every evening and have noticed readings are 140s to 150s over 80s to 90s  Patient notes fluctuations in blood pressure depending on how much pain he is in  Notes blood pressures in the 130s when he is not in any discomfort  Review of Systems   Constitutional: Negative for chills and fatigue  HENT: Negative for sore throat and trouble swallowing  Eyes: Negative for photophobia and visual disturbance  Respiratory: Negative for cough, chest tightness and shortness of breath  Cardiovascular: Negative for chest pain, palpitations and leg swelling  Gastrointestinal: Negative for nausea and vomiting     Genitourinary: Negative for decreased urine volume, flank pain and hematuria  Musculoskeletal: Negative for arthralgias and myalgias  Skin: Negative for color change and rash  Neurological: Negative for dizziness, weakness, light-headedness and headaches  Psychiatric/Behavioral: Negative for confusion  Current Outpatient Medications on File Prior to Visit   Medication Sig   • atorvastatin (LIPITOR) 20 mg tablet Take 1 tablet (20 mg total) by mouth every evening   • Blood Glucose Monitoring Suppl (OneTouch Verio Reflect) w/Device KIT Check blood sugars once daily  Please substitute with appropriate alternative as covered by patient's insurance  Dx: E11 65   • gabapentin (NEURONTIN) 300 mg capsule Take 1 capsule (300 mg total) by mouth 3 (three) times a day   • glimepiride (AMARYL) 2 mg tablet TAKE ONE TABLET BY MOUTH EVERY DAY WITH BREAKFAST   • glucose blood (OneTouch Verio) test strip Check blood sugars once daily  Please substitute with appropriate alternative as covered by patient's insurance  Dx: U17 15   • OneTouch Delica Lancets 64D MISC Check blood sugars once daily  Please substitute with appropriate alternative as covered by patient's insurance  Dx: E11 65   • [DISCONTINUED] lisinopril (ZESTRIL) 20 mg tablet Take 1 tablet (20 mg total) by mouth daily       Objective     /88 (BP Location: Left arm, Patient Position: Sitting, Cuff Size: Standard)   Pulse 93   Temp (!) 96 2 °F (35 7 °C) (Tympanic)   Ht 5' 9" (1 753 m)   Wt 77 7 kg (171 lb 3 2 oz)   SpO2 99%   BMI 25 28 kg/m²     Physical Exam  Vitals reviewed  Constitutional:       General: He is not in acute distress  Appearance: Normal appearance  He is not ill-appearing  HENT:      Head: Normocephalic and atraumatic        Right Ear: Tympanic membrane, ear canal and external ear normal       Left Ear: Tympanic membrane, ear canal and external ear normal       Nose: Nose normal       Mouth/Throat:      Mouth: Mucous membranes are moist       Pharynx: Oropharynx is clear    Eyes:      Conjunctiva/sclera: Conjunctivae normal       Pupils: Pupils are equal, round, and reactive to light  Neck:      Vascular: No carotid bruit  Cardiovascular:      Rate and Rhythm: Normal rate and regular rhythm  Pulses: Normal pulses  Heart sounds: Normal heart sounds  No murmur heard  Pulmonary:      Effort: Pulmonary effort is normal       Breath sounds: Normal breath sounds  Abdominal:      General: Bowel sounds are normal       Palpations: Abdomen is soft  Tenderness: There is no abdominal tenderness  Musculoskeletal:         General: Normal range of motion  Cervical back: Normal range of motion and neck supple  Right lower leg: No edema  Left lower leg: No edema  Skin:     General: Skin is warm and dry  Capillary Refill: Capillary refill takes less than 2 seconds  Neurological:      General: No focal deficit present  Mental Status: He is alert and oriented to person, place, and time     Psychiatric:         Mood and Affect: Mood normal          Behavior: Behavior normal        Catha Sandifer, CRNP

## 2023-03-10 ENCOUNTER — OFFICE VISIT (OUTPATIENT)
Dept: PHYSICAL THERAPY | Facility: CLINIC | Age: 63
End: 2023-03-10

## 2023-03-10 DIAGNOSIS — M54.16 LUMBAR RADICULOPATHY, CHRONIC: ICD-10-CM

## 2023-03-10 DIAGNOSIS — M21.372 LEFT FOOT DROP: Primary | ICD-10-CM

## 2023-03-10 DIAGNOSIS — M54.41 ACUTE BILATERAL LOW BACK PAIN WITH BILATERAL SCIATICA: ICD-10-CM

## 2023-03-10 DIAGNOSIS — M54.42 ACUTE BILATERAL LOW BACK PAIN WITH BILATERAL SCIATICA: ICD-10-CM

## 2023-03-10 NOTE — PROGRESS NOTES
Daily Note     Today's date: 3/10/2023  Patient name: Eloisa Shabazz  : 1960  MRN: [id number]  Referring provider: Maycol Hernandes, PT  Dx:   Encounter Diagnosis     ICD-10-CM    1  Left foot drop  M21 372       2  Acute bilateral low back pain with bilateral sciatica  M54 42     M54 41       3  Lumbar radiculopathy, chronic  M54 16                      Subjective: Patient notes he worked yesterday and he is sore   He notes that he has to work again today   Pamela Ann 89 that his back is a little sore from his extra working this week  He states that he is using a nerve supplement to assist his drop foot - feels that he may have more movement and less numbness overall  Objective: See treatment diary below      Assessment: Tolerated treatment well  Patient demonstrated fatigue post treatment and would benefit from continued PT    Noticing slight increase in active df/ ever left foot as well as improving strength pf, inv today       Plan: Continue per plan of care  Progress treatment as tolerated  Precautions: L4-5 radiculopathy, 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 )    [employer]  Access Code: [email or web address]        Manuals 2/24 3/1 3/3 3/8 3/10    2/15 2/22   FOTO     db    LA  Score:  63/71                 HSS bilat             Left DF AAROM db db LA LA db    LA LA                MRE;s left foot db db LA LA db    20x  LA 20x  LA                Neuro Re-Ed             Prone gs 3s x 20 3s x 20 :03  20x :03  20x 3s x 20    :03  20x :03  20x   Prone hip ext 2# R 0# L x 20  2 5# R 0# L x 20 ea R 3#  L 0#  20x  R 3#  L 0#  20x  each R 3#   L 0 # x 20 ea    2#  20x 2#  20x   Prone hs curls  2# R0# L x 20 ea  2 5# R  0# L x 20  R 3#  L 0#  20x R 3#  L 0#  20x  each 3# R  0# L x 20 ea     R 2#  L 0#  20x each R 2#  L 0#  20x each   Prone hip ext w/ knee flexed  0# L 2 5# R  X 20 ea 2 5# R 0# L x 20 ea  R 3#  L 0#  20x each   R 3#  L 0#  20x each 3# R 0# L 20x ea     2#  20x 2#  20x   Bridging  btb x 20 btb x 20 BTB  20x BTB  20x btb x 20x    BTB  20x BTB  20x   HL TB Hip abd  btb x 20  btb x 20  BTB  20x BTB  20x btb x 20     BTB  20x BTB  20x   Clamshells  btb x 20 btb x 20  BTB  20x each BTB  20x   each btb x 20     BTB  20x BTB  20x                TG L10  20 x 3 20 x 3 20x3 20x3 20 x 3     20x2 20x3   Step ups  F/L 8" + foam x 20 8" + Foam x 20 ea 8"+  Foam  20x each 8"+ foam  20x each 8" foam x 20 ea    8" + foam  10x 8"+ foam  20x each   TRX squats 20x  20x  20x 20x 20x     20x 20x                Ther Ex             bike 10'  10'  10' 10' 10'     10' 10'                             Press ups 10x  10 x 10x 10x 10x    10x 10x   Loc and sag 10x 10 x 10x 10x 10x    10x 10x                             B hss w/ strap 20s x 3  20s x 3  :20  3x :20  3x each     :20  3x each :20  3x each                Side stepping TB     gtb 30' x 2                                   Stand hip abd, hs curls, ext Foam 2 5# x 20 Foam 2 5# x 20  Foam  3#  20x  B  Foam  3#  20x     Foam 2 5#  20x each Foam  2 5#  20x each                prostretch  20s x 3 L 20s x 3 L  :20  3x  L L  :20  3x 20s x 3    L  :20  3x L  :20  3x                                                                    Ther Activity                                       Gait Training                                       Modalities

## 2023-03-15 ENCOUNTER — OFFICE VISIT (OUTPATIENT)
Dept: PHYSICAL THERAPY | Facility: CLINIC | Age: 63
End: 2023-03-15

## 2023-03-15 DIAGNOSIS — M54.41 ACUTE BILATERAL LOW BACK PAIN WITH BILATERAL SCIATICA: ICD-10-CM

## 2023-03-15 DIAGNOSIS — M54.16 LUMBAR RADICULOPATHY, CHRONIC: ICD-10-CM

## 2023-03-15 DIAGNOSIS — M21.372 LEFT FOOT DROP: Primary | ICD-10-CM

## 2023-03-15 DIAGNOSIS — M54.42 ACUTE BILATERAL LOW BACK PAIN WITH BILATERAL SCIATICA: ICD-10-CM

## 2023-03-15 NOTE — PROGRESS NOTES
Daily Note     Today's date: 3/15/2023  Patient name: Nba Colunga  : 1960  MRN: [id number]  Referring provider: Sasha Handley, PT  Dx: No diagnosis found  Subjective: Patient noted limitations in endurance over the past few days, most especially while at work  Objective: See treatment diary below      Assessment: Tolerated treatment appearing challenged by today's program, occasionally requiring brief rest periods  Discussed energy conservation management with TE performance and at work  Patient demonstrated fatigue post treatment and would benefit from continued PT      Plan: Continue per plan of care  Progress treatment as tolerated  Precautions: L4-5 radiculopathy, 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 )    [employer]  Access Code: [email or web address]        Manuals 2/24 3/1 3/3 3/8 3/10 3/15   2/15 2/22   FOTO     db    LA  Score:  63/71                 HSS bilat             Left DF AAROM db db LA LA db LA   LA LA                MRE;s left foot db db LA LA db LA   20x  LA 20x  LA                Neuro Re-Ed             Prone gs 3s x 20 3s x 20 :03  20x :03  20x 3s x 20 :03  20x   :03  20x :03  20x   Prone hip ext 2# R 0# L x 20  2 5# R 0# L x 20 ea R 3#  L 0#  20x  R 3#  L 0#  20x  each R 3#   L 0 # x 20 ea R 3#  L 0#  20x each   2#  20x 2#  20x   Prone hs curls  2# R0# L x 20 ea  2 5# R  0# L x 20  R 3#  L 0#  20x R 3#  L 0#  20x  each 3# R  0# L x 20 ea  R 3#  L 0#  20x each   R 2#  L 0#  20x each R 2#  L 0#  20x each   Prone hip ext w/ knee flexed  0# L 2 5# R  X 20 ea 2 5# R 0# L x 20 ea  R 3#  L 0#  20x each   R 3#  L 0#  20x each 3# R 0# L 20x ea  R 3#  L 0#  20x each   2#  20x 2#  20x   Bridging  btb x 20 btb x 20 BTB  20x BTB  20x btb x 20x BTB  20x    BTB  20x BTB  20x   HL TB Hip abd  btb x 20  btb x 20  BTB  20x BTB  20x btb x 20  BTB  20x   BTB  20x BTB  20x   Clamshells  btb x 20 btb x 20  BTB  20x each BTB  20x   each btb x 20  BTB  20x   BTB  20x BTB  20x                TG L10  20 x 3 20 x 3 20x3 20x3 20 x 3  20x3   20x2 20x3   Step ups  F/L 8" + foam x 20 8" + Foam x 20 ea 8"+  Foam  20x each 8"+ foam  20x each 8" foam x 20 ea 8"+ foam  20x each   8" + foam  10x 8"+ foam  20x each   TRX squats 20x  20x  20x 20x 20x     20x 20x                Ther Ex             bike 10'  10'  10' 10' 10'  10'   10' 10'                             Press ups 10x  10 x 10x 10x 10x 10x   10x 10x   Loc and sag 10x 10 x 10x 10x 10x 10x   10x 10x                             B hss w/ strap 20s x 3  20s x 3  :20  3x :20  3x each     :20  3x each :20  3x each                Side stepping TB     gtb 30' x 2  GTB  30'x2                                 Stand hip abd, hs curls, ext Foam 2 5# x 20 Foam 2 5# x 20  Foam  3#  20x  B  Foam  3#  20x     Foam 2 5#  20x each Foam  2 5#  20x each                prostretch  20s x 3 L 20s x 3 L  :20  3x  L L  :20  3x 20s x 3 :20  3x   L  :20  3x L  :20  3x                                                                    Ther Activity                                       Gait Training                                       Modalities

## 2023-03-16 ENCOUNTER — OFFICE VISIT (OUTPATIENT)
Dept: GASTROENTEROLOGY | Facility: CLINIC | Age: 63
End: 2023-03-16

## 2023-03-16 VITALS
OXYGEN SATURATION: 97 % | SYSTOLIC BLOOD PRESSURE: 130 MMHG | BODY MASS INDEX: 24.82 KG/M2 | HEIGHT: 69 IN | HEART RATE: 120 BPM | DIASTOLIC BLOOD PRESSURE: 80 MMHG | WEIGHT: 167.6 LBS

## 2023-03-16 DIAGNOSIS — Z12.11 COLON CANCER SCREENING: Primary | ICD-10-CM

## 2023-03-16 DIAGNOSIS — R10.13 EPIGASTRIC DISCOMFORT: ICD-10-CM

## 2023-03-16 DIAGNOSIS — R63.4 WEIGHT LOSS: ICD-10-CM

## 2023-03-16 NOTE — PATIENT INSTRUCTIONS
Scheduled date of EGD/colonoscopy (as of today):4/29/23  Physician performing EGD/colonoscopy: Sang  Location of EGD/colonoscopy:[facility]  Desired bowel prep reviewed with patient:miralax/dulcolax  Instructions reviewed with patient by:Kaylynn PHAM  Clearances:   none

## 2023-03-16 NOTE — PROGRESS NOTES
[facility] - Outpatient Consultation  Jorgito Albert 58 y o  male MRN: [id number]  Encounter: [id number]          ASSESSMENT AND PLAN:      1  Weight loss  2  Colon cancer screening  - Schedule colonoscopy with miralax prep for screening; last colonoscopy was 10+ years ago and he reports having 2 precancerous polyps removed  - He has lost 20lbs over the past several months which I suspect is due to his significant lifestyle changes with cutting out his significant alcohol intake as well as cutting out sweets/sugars for his diabetes  - Discussed continuing fiber supplementation for his mild constipation      3  Epigastric discomfort  - He reports a vague epigastric discomfort/hollow feeling and reports occasional nausea without any significant peptic symptoms  - We will schedule an EGD at the time of his colonoscopy for further evaluation    ______________________________________________________________________    HPI:  Leny Dunne is a 59 yo M with a PMH of DM2, HTN, HLD, presenting to schedule a colonoscopy as well as for evaluation of a strange upper abdominal feeling for the past several weeks that he describes as a hollow feeling or discomfort  His last colonoscopy was 10+ years ago and he believes he had 2 precancerous polyps removed  He denies any major changes but has noticed some mild constipation over the past several weeks as well as the vague upper abdominal discomfort  He denies any significant reflux symptoms currently though this used to be a problem for him years ago  He has never had an upper endoscopy  He reports occasional nausea  He reports a weight loss of about 20 lbs in the past 2 months which he was not trying to do but he has made significant lifestyle changes given his uncontrolled A1C of 11 diagnosed in the fall  He has been able to get his A1C down to 6 3 in January with cutting out sweets/sugars as well as not drinking beer   He previously was drinking 3-4 beers per day during the week and more during the week, typically drinking IPAs which are higher in calorie  He wants to make sure nothing is wrong given the weight loss as he was not expecting to lose this much  REVIEW OF SYSTEMS:    CONSTITUTIONAL: Denies any fever, chills, rigors, and weight loss  HEENT: No earache or tinnitus  Denies hearing loss or visual disturbances  CARDIOVASCULAR: No chest pain or palpitations  RESPIRATORY: Denies any cough, hemoptysis, shortness of breath or dyspnea on exertion  GASTROINTESTINAL: As noted in the History of Present Illness  GENITOURINARY: No problems with urination  Denies any hematuria or dysuria  NEUROLOGIC: No dizziness or vertigo, denies headaches  MUSCULOSKELETAL: Denies any muscle or joint pain  SKIN: Denies skin rashes or itching  ENDOCRINE: Denies excessive thirst  Denies intolerance to heat or cold  PSYCHOSOCIAL: Denies depression or anxiety  Denies any recent memory loss  Historical Information   Past Medical History:   Diagnosis Date   • Diabetes mellitus ([place] [address] )    • Hyperlipidemia    • Sciatica    • Type 2 diabetes mellitus ([place] [address] )      History reviewed  No pertinent surgical history    Social History   Social History     Substance and Sexual Activity   Alcohol Use Yes     Social History     Substance and Sexual Activity   Drug Use Not Currently     Social History     Tobacco Use   Smoking Status Some Days   • Types: Cigars   Smokeless Tobacco Never   Tobacco Comments    Occasionally     Family History   Problem Relation Age of Onset   • Diabetes Mother    • Alzheimer's disease Mother    • Dementia Father    • Prostate cancer Father    • Hypertension Father    • Hyperlipidemia Father        Meds/Allergies       Current Outpatient Medications:   •  atorvastatin (LIPITOR) 20 mg tablet  •  Blood Glucose Monitoring Suppl (OneTouch Verio Reflect) w/Device KIT  •  gabapentin (NEURONTIN) 300 mg capsule  •  glimepiride (AMARYL) 2 mg tablet  •  glucose blood (OneTouch Verio) test strip  •  lisinopril (ZESTRIL) 30 mg tablet  •  OneTouch Delica Lancets 47U MISC    No Known Allergies        Objective     Blood pressure 130/80, pulse (!) 120, height 5' 9" (1 753 m), weight 76 kg (167 lb 9 6 oz), SpO2 97 %  Body mass index is 24 75 kg/m²  PHYSICAL EXAM:      General Appearance:   Alert, cooperative, no distress   HEENT:   Normocephalic, atraumatic, anicteric      Neck:  Supple, symmetrical, trachea midline   Lungs:   Clear to auscultation bilaterally; no rales, rhonchi or wheezing; respirations unlabored    Heart[de-identified]   Regular rate and rhythm; no murmur, rub, or gallop  Abdomen:   Soft, non-tender, non-distended; normal bowel sounds; no masses, no organomegaly    Genitalia:   Deferred    Rectal:   Deferred    Extremities:  No cyanosis, clubbing or edema    Pulses:  2+ and symmetric    Skin:  No jaundice, rashes, or lesions    Lymph nodes:  No palpable cervical lymphadenopathy        Lab Results:   No visits with results within 1 Day(s) from this visit  Latest known visit with results is:   Appointment on 02/22/2023   Component Date Value   • Sodium 02/24/2023 137    • Potassium 02/24/2023 4 5    • Chloride 02/24/2023 101    • CO2 02/24/2023 27    • ANION GAP 02/24/2023 9    • BUN 02/24/2023 13    • Creatinine 02/24/2023 1 12    • Glucose, Fasting 02/24/2023 129 (H)    • Calcium 02/24/2023 9 2    • AST 02/24/2023 28    • ALT 02/24/2023 37    • Alkaline Phosphatase 02/24/2023 68    • Total Protein 02/24/2023 6 9    • Albumin 02/24/2023 3 6    • Total Bilirubin 02/24/2023 0 71    • eGFR 02/24/2023 70    • Cholesterol 02/24/2023 153    • Triglycerides 02/24/2023 46    • HDL, Direct 02/24/2023 66    • LDL Calculated 02/24/2023 78    • Non-HDL-Chol (CHOL-HDL) 02/24/2023 87          Radiology Results:   No results found

## 2023-03-17 ENCOUNTER — OFFICE VISIT (OUTPATIENT)
Dept: PHYSICAL THERAPY | Facility: CLINIC | Age: 63
End: 2023-03-17

## 2023-03-17 DIAGNOSIS — M54.16 LUMBAR RADICULOPATHY, CHRONIC: ICD-10-CM

## 2023-03-17 DIAGNOSIS — M54.42 ACUTE BILATERAL LOW BACK PAIN WITH BILATERAL SCIATICA: ICD-10-CM

## 2023-03-17 DIAGNOSIS — M21.372 LEFT FOOT DROP: Primary | ICD-10-CM

## 2023-03-17 DIAGNOSIS — M54.41 ACUTE BILATERAL LOW BACK PAIN WITH BILATERAL SCIATICA: ICD-10-CM

## 2023-03-17 NOTE — PROGRESS NOTES
Daily Note     Today's date: 3/17/2023  Patient name: Eleuterio Lazo  : 1960  MRN: [id number]  Referring provider: Priscilla Vela, PT  Dx:   Encounter Diagnosis     ICD-10-CM    1  Left foot drop  M21 372       2  Acute bilateral low back pain with bilateral sciatica  M54 42     M54 41       3  Lumbar radiculopathy, chronic  M54 16           Start Time: 1028          Subjective: "I feel like I'm going further with my stretching, it's getting better"  Objective: See treatment diary below      Assessment: Tolerated treatment well  Patient demonstrated fatigue post treatment and would benefit from continued PT      Plan: Continue per plan of care  Progress treatment as tolerated  Precautions: L4-5 radiculopathy, 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 )    [employer]  Access Code: [email or web address]        Manuals 2/24 3/1 3/3 3/8 3/10 3/15 3/17  2/15 2/22   FOTO     db    LA  Score:  63/71                 HSS bilat             Left DF AAROM db db LA LA db LA LA  LA LA                MRE;s left foot db db LA LA db LA LA  20x  LA 20x  LA                Neuro Re-Ed             Prone gs 3s x 20 3s x 20 :03  20x :03  20x 3s x 20 :03  20x :03  20x  :03  20x :03  20x   Prone hip ext 2# R 0# L x 20  2 5# R 0# L x 20 ea R 3#  L 0#  20x  R 3#  L 0#  20x  each R 3#   L 0 # x 20 ea R 3#  L 0#  20x each R 3#  L 0#  20x  2#  20x 2#  20x   Prone hs curls  2# R0# L x 20 ea  2 5# R  0# L x 20  R 3#  L 0#  20x R 3#  L 0#  20x  each 3# R  0# L x 20 ea  R 3#  L 0#  20x each R 3#  L 0#  20x each  R 2#  L 0#  20x each R 2#  L 0#  20x each   Prone hip ext w/ knee flexed  0# L 2 5# R  X 20 ea 2 5# R 0# L x 20 ea  R 3#  L 0#  20x each   R 3#  L 0#  20x each 3# R 0# L 20x ea  R 3#  L 0#  20x each R 3#  L 0#  20x  2#  20x 2#  20x   Bridging  btb x 20 btb x 20 BTB  20x BTB  20x btb x 20x BTB  20x  BTB  20x  BTB  20x BTB  20x   HL TB Hip abd  btb x 20  btb x 20  BTB  20x BTB  20x btb x 20  BTB  20x BTB  20x  BTB  20x BTB  20x   Clamshells  btb x 20 btb x 20  BTB  20x each BTB  20x   each btb x 20  BTB  20x BTB  20x  BTB  20x BTB  20x                TG L10  20 x 3 20 x 3 20x3 20x3 20 x 3  20x3 20x3  20x2 20x3   Step ups  F/L 8" + foam x 20 8" + Foam x 20 ea 8"+  Foam  20x each 8"+ foam  20x each 8" foam x 20 ea 8"+ foam  20x each 8"+ foam  20x each  8" + foam  10x 8"+ foam  20x each   TRX squats 20x  20x  20x 20x 20x   20x  20x 20x                Ther Ex             bike 10'  10'  10' 10' 10'  10' 10'  10' 10'                             Press ups 10x  10 x 10x 10x 10x 10x 10x  10x 10x   Loc and sag 10x 10 x 10x 10x 10x 10x 10x  10x 10x                             B hss w/ strap 20s x 3  20s x 3  :20  3x :20  3x each     :20  3x each :20  3x each                Side stepping TB     gtb 30' x 2  GTB  30'x2 GTB  30'x2                                Stand hip abd, hs curls, ext Foam 2 5# x 20 Foam 2 5# x 20  Foam  3#  20x  B  Foam  3#  20x     Foam 2 5#  20x each Foam  2 5#  20x each                prostretch  20s x 3 L 20s x 3 L  :20  3x  L L  :20  3x 20s x 3 :20  3x :20 3x   L  :20  3x L  :20  3x                                                                    Ther Activity                                       Gait Training                                       Modalities

## 2023-03-22 ENCOUNTER — OFFICE VISIT (OUTPATIENT)
Dept: PHYSICAL THERAPY | Facility: CLINIC | Age: 63
End: 2023-03-22

## 2023-03-22 DIAGNOSIS — M54.42 ACUTE BILATERAL LOW BACK PAIN WITH BILATERAL SCIATICA: ICD-10-CM

## 2023-03-22 DIAGNOSIS — M54.16 LUMBAR RADICULOPATHY, CHRONIC: ICD-10-CM

## 2023-03-22 DIAGNOSIS — M21.372 LEFT FOOT DROP: Primary | ICD-10-CM

## 2023-03-22 DIAGNOSIS — M54.41 ACUTE BILATERAL LOW BACK PAIN WITH BILATERAL SCIATICA: ICD-10-CM

## 2023-03-22 NOTE — PROGRESS NOTES
Daily Note     Today's date: 3/22/2023  Patient name: Jayson Liang  : 1960  MRN: [id number]  Referring provider: Bryan Ochoa, PT  Dx:   Encounter Diagnosis     ICD-10-CM    1  Left foot drop  M21 372       2  Acute bilateral low back pain with bilateral sciatica  M54 42     M54 41       3  Lumbar radiculopathy, chronic  M54 16                      Subjective: Patient stated he suspects his core is weak, noting recently when performing tasks slightly flexed at his trunk he is unable to "hold" himself in that position without lbp and having to use UE's for support  Objective: See treatment diary below      Assessment: Tolerated treatment requiring vc's for foot placement and occasionally for posturing during TE performance  Reviewed specific proper  techniques with work and home responsibilities/tasks  Patient demonstrated fatigue post treatment and would benefit from continued PT      Plan: Continue per plan of care  Progress treatment as tolerated  Precautions: L4-5 radiculopathy, 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 )    [employer]  Access Code: [email or web address]        Manuals 2/24 3/1 3/3 3/8 3/10 3/15 3/17 3/22     FOTO     db                     HSS bilat             Left DF AAROM db db LA LA db LA LA LA                  MRE;s left foot db db LA LA db LA LA LA                  Neuro Re-Ed             Prone gs 3s x 20 3s x 20 :03  20x :03  20x 3s x 20 :03  20x :03  20x :03  20x     Prone hip ext 2# R 0# L x 20  2 5# R 0# L x 20 ea R 3#  L 0#  20x  R 3#  L 0#  20x  each R 3#   L 0 # x 20 ea R 3#  L 0#  20x each R 3#  L 0#  20x R 3#  L 0#  20x       Prone hs curls  2# R0# L x 20 ea  2 5# R  0# L x 20  R 3#  L 0#  20x R 3#  L 0#  20x  each 3# R  0# L x 20 ea  R 3#  L 0#  20x each R 3#  L 0#  20x each R 3#  L 0#  20x each     Prone hip ext w/ knee flexed  0# L 2 5# R  X 20 ea 2 5# R 0# L x 20 ea  R 3#  L 0#  20x each   R 3#  L 0#  20x each 3# R 0# L 20x ea  R 3#  L 0#  20x each R 3#  L 0#  20x R 3#  L 0#  20x     Bridging  btb x 20 btb x 20 BTB  20x BTB  20x btb x 20x BTB  20x  BTB  20x BTB  20x      HL TB Hip abd  btb x 20  btb x 20  BTB  20x BTB  20x btb x 20  BTB  20x BTB  20x BTB  20x      Clamshells  btb x 20 btb x 20  BTB  20x each BTB  20x   each btb x 20  BTB  20x BTB  20x BTB  20x                  TG L10  20 x 3 20 x 3 20x3 20x3 20 x 3  20x3 20x3 20x3     Step ups  F/L 8" + foam x 20 8" + Foam x 20 ea 8"+  Foam  20x each 8"+ foam  20x each 8" foam x 20 ea 8"+ foam  20x each 8"+ foam  20x each 8"  +foam  20x each     TRX squats 20x  20x  20x 20x 20x   20x 20x                  Ther Ex             bike 10'  10'  10' 10' 10'  10' 10' 10'                               Press ups 10x  10 x 10x 10x 10x 10x 10x 20x     Loc and sag 10x 10 x 10x 10x 10x 10x 10x 20x                               B hss w/ strap 20s x 3  20s x 3  :20  3x :20  3x each                      Side stepping TB     gtb 30' x 2  GTB  30'x2 GTB  30'x2 GTB  30'x3                               Stand hip abd, hs curls, ext Foam 2 5# x 20 Foam 2 5# x 20  Foam  3#  20x  B  Foam  3#  20x                      prostretch  20s x 3 L 20s x 3 L  :20  3x  L L  :20  3x 20s x 3 :20  3x :20 3x  :20  3x                                                                      Ther Activity                                       Gait Training                                       Modalities

## 2023-03-24 ENCOUNTER — OFFICE VISIT (OUTPATIENT)
Dept: PHYSICAL THERAPY | Facility: CLINIC | Age: 63
End: 2023-03-24

## 2023-03-24 DIAGNOSIS — M54.42 ACUTE BILATERAL LOW BACK PAIN WITH BILATERAL SCIATICA: ICD-10-CM

## 2023-03-24 DIAGNOSIS — M54.41 ACUTE BILATERAL LOW BACK PAIN WITH BILATERAL SCIATICA: ICD-10-CM

## 2023-03-24 DIAGNOSIS — M54.16 LUMBAR RADICULOPATHY, CHRONIC: ICD-10-CM

## 2023-03-24 DIAGNOSIS — M21.372 LEFT FOOT DROP: Primary | ICD-10-CM

## 2023-03-24 NOTE — PROGRESS NOTES
Daily Note     Today's date: 3/24/2023  Patient name: Maria G Rosales  : 1960  MRN: [id number]  Referring provider: Karli Ventura PT  Dx:   Encounter Diagnosis     ICD-10-CM    1  Left foot drop  M21 372       2  Acute bilateral low back pain with bilateral sciatica  M54 42     M54 41       3  Lumbar radiculopathy, chronic  M54 16           Start Time: 1027          Subjective: Patient noted he is able to "control muscles and walk better when I don't work the day before"      Objective: See treatment diary below      Assessment: Tolerated treatment well  Patient demonstrated fatigue post treatment and exhibited good technique with therapeutic exercises      Plan: Continue per plan of care  Progress treatment as tolerated  Precautions: L4-5 radiculopathy, 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 )    [employer]  Access Code: [email or web address]        Manuals 2/24 3/1 3/3 3/8 3/10 3/15 3/17 3/22 3/24    FOTO     db                     HSS bilat             Left DF AAROM db db LA LA db LA LA LA LA                 MRE;s left foot db db LA LA db LA LA LA LA                 Neuro Re-Ed             Prone gs 3s x 20 3s x 20 :03  20x :03  20x 3s x 20 :03  20x :03  20x :03  20x :03  20x    Prone hip ext 2# R 0# L x 20  2 5# R 0# L x 20 ea R 3#  L 0#  20x  R 3#  L 0#  20x  each R 3#   L 0 # x 20 ea R 3#  L 0#  20x each R 3#  L 0#  20x R 3#  L 0#  20x   R 3#  L 0#  20x     Prone hs curls  2# R0# L x 20 ea  2 5# R  0# L x 20  R 3#  L 0#  20x R 3#  L 0#  20x  each 3# R  0# L x 20 ea  R 3#  L 0#  20x each R 3#  L 0#  20x each R 3#  L 0#  20x each R 3#  L 0#  20x each    Prone hip ext w/ knee flexed  0# L 2 5# R  X 20 ea 2 5# R 0# L x 20 ea  R 3#  L 0#  20x each   R 3#  L 0#  20x each 3# R 0# L 20x ea  R 3#  L 0#  20x each R 3#  L 0#  20x R 3#  L 0#  20x R 3#  L 0#  20x     Bridging  btb x 20 btb x 20 BTB  20x BTB  20x btb x 20x BTB  20x  BTB  20x BTB  20x  BTB  20x    HL TB Hip abd  btb x 20  btb x 20 BTB  20x BTB  20x btb x 20  BTB  20x BTB  20x BTB  20x  BTB  20x    Clamshells  btb x 20 btb x 20  BTB  20x each BTB  20x   each btb x 20  BTB  20x BTB  20x BTB  20x BTB  20x                 TG L10  20 x 3 20 x 3 20x3 20x3 20 x 3  20x3 20x3 20x3 20x3    Step ups  F/L 8" + foam x 20 8" + Foam x 20 ea 8"+  Foam  20x each 8"+ foam  20x each 8" foam x 20 ea 8"+ foam  20x each 8"+ foam  20x each 8"  +foam  20x each 8" + foam  20x     TRX squats 20x  20x  20x 20x 20x   20x 20x 20x                 Ther Ex             bike 10'  10'  10' 10' 10'  10' 10' 10' 10'                              Press ups 10x  10 x 10x 10x 10x 10x 10x 20x 20x    Loc and sag 10x 10 x 10x 10x 10x 10x 10x 20x 20x                              B hss w/ strap 20s x 3  20s x 3  :20  3x :20  3x each                      Side stepping TB     gtb 30' x 2  GTB  30'x2 GTB  30'x2 GTB  30'x3 GTB  30'x3                              Stand hip abd, hs curls, ext Foam 2 5# x 20 Foam 2 5# x 20  Foam  3#  20x  B  Foam  3#  20x                      prostretch  20s x 3 L 20s x 3 L  :20  3x  L L  :20  3x 20s x 3 :20  3x :20 3x  :20  3x :20  3x                                                                     Ther Activity                                       Gait Training                                       Modalities

## 2023-03-29 ENCOUNTER — APPOINTMENT (OUTPATIENT)
Dept: PHYSICAL THERAPY | Facility: CLINIC | Age: 63
End: 2023-03-29

## 2023-03-31 ENCOUNTER — OFFICE VISIT (OUTPATIENT)
Dept: PHYSICAL THERAPY | Facility: CLINIC | Age: 63
End: 2023-03-31

## 2023-03-31 DIAGNOSIS — M54.16 LUMBAR RADICULOPATHY, CHRONIC: ICD-10-CM

## 2023-03-31 DIAGNOSIS — M21.372 LEFT FOOT DROP: Primary | ICD-10-CM

## 2023-03-31 DIAGNOSIS — M54.41 ACUTE BILATERAL LOW BACK PAIN WITH BILATERAL SCIATICA: ICD-10-CM

## 2023-03-31 DIAGNOSIS — M54.42 ACUTE BILATERAL LOW BACK PAIN WITH BILATERAL SCIATICA: ICD-10-CM

## 2023-03-31 NOTE — PROGRESS NOTES
Daily Note     Today's date: 3/31/2023  Patient name: Leopoldo Rea  : 1960  MRN: [id number]  Referring provider: Samantha Glynn, PT  Dx:   Encounter Diagnosis     ICD-10-CM    1  Left foot drop  M21 372       2  Lumbar radiculopathy, chronic  M54 16       3  Acute bilateral low back pain with bilateral sciatica  M54 42     M54 41                      Subjective: Patient reports that he has worked 40 hours in 4 days and is fatigued today  After discussions, we agree to continue PT 1x/week thru April then plan tentative dc to HEP      Objective: See treatment diary below      Assessment: Tolerated treatment well  Patient with improved gait quality , improved AROM, improving strength left ankle all planes   Less foot drop noted during ambulation   Plan: Continue per plan of care  Progress treatment as tolerated  Precautions: L4-5 radiculopathy, 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 )    [email or web address]  Access Code: [email or web address]        Manuals 2/24 3/1 3/3 3/8 3/10 3/15 3/17 3/22 3/24 3/31   FOTO     db                     HSS bilat             Left DF AAROM db db LA LA db LA LA LA LA db                MRE;s left foot db db LA LA db LA LA LA LA db                Neuro Re-Ed             Prone gs 3s x 20 3s x 20 :03  20x :03  20x 3s x 20 :03  20x :03  20x :03  20x :03  20x 3s x 20    Prone hip ext 2# R 0# L x 20  2 5# R 0# L x 20 ea R 3#  L 0#  20x  R 3#  L 0#  20x  each R 3#   L 0 # x 20 ea R 3#  L 0#  20x each R 3#  L 0#  20x R 3#  L 0#  20x   R 3#  L 0#  20x  R3#  L 1# x 20   Prone hs curls  2# R0# L x 20 ea  2 5# R  0# L x 20  R 3#  L 0#  20x R 3#  L 0#  20x  each 3# R  0# L x 20 ea  R 3#  L 0#  20x each R 3#  L 0#  20x each R 3#  L 0#  20x each R 3#  L 0#  20x each R 3# L 1# 20x ea    Prone hip ext w/ knee flexed  0# L 2 5# R  X 20 ea 2 5# R 0# L x 20 ea  R 3#  L 0#  20x each   R 3#  L 0#  20x each 3# R 0# L 20x ea  R 3#  L 0#  20x each R 3#  L 0#  20x R 3#  L 0#  20x R 3#  L "0#  20x  R  3# L 1# x 20 ea   Bridging  btb x 20 btb x 20 BTB  20x BTB  20x btb x 20x BTB  20x  BTB  20x BTB  20x  BTB  20x btb x 20   HL TB Hip abd  btb x 20  btb x 20  BTB  20x BTB  20x btb x 20  BTB  20x BTB  20x BTB  20x  BTB  20x btb x 20   Clamshells  btb x 20 btb x 20  BTB  20x each BTB  20x   each btb x 20  BTB  20x BTB  20x BTB  20x BTB  20x btb x 20                 TG L10  20 x 3 20 x 3 20x3 20x3 20 x 3  20x3 20x3 20x3 20x3 20 x 3   Step ups  F/L 8\" + foam x 20 8\" + Foam x 20 ea 8\"+  Foam  20x each 8\"+ foam  20x each 8\" foam x 20 ea 8\"+ foam  20x each 8\"+ foam  20x each 8\"  +foam  20x each 8\" + foam  20x  8\" + foam x 20 ea    TRX squats 20x  20x  20x 20x 20x   20x 20x 20x 20x                Ther Ex             bike 10'  10'  10' 10' 10'  10' 10' 10' 10' 10'                              Press ups 10x  10 x 10x 10x 10x 10x 10x 20x 20x 20x    Loc and sag 10x 10 x 10x 10x 10x 10x 10x 20x 20x 20x                             B hss w/ strap 20s x 3  20s x 3  :20  3x :20  3x each      20s x 3 bilat                Side stepping TB     gtb 30' x 2  GTB  30'x2 GTB  30'x2 GTB  30'x3 GTB  30'x3 gtb 30' x 3                             Stand hip abd, hs curls, ext Foam 2 5# x 20 Foam 2 5# x 20  Foam  3#  20x  B  Foam  3#  20x                      prostretch  20s x 3 L 20s x 3 L  :20  3x  L L  :20  3x 20s x 3 :20  3x :20 3x  :20  3x :20  3x 20s x 3                                                                    Ther Activity                                       Gait Training                                       Modalities                                                             "

## 2023-04-07 ENCOUNTER — OFFICE VISIT (OUTPATIENT)
Dept: PHYSICAL THERAPY | Facility: CLINIC | Age: 63
End: 2023-04-07

## 2023-04-07 DIAGNOSIS — M54.41 ACUTE BILATERAL LOW BACK PAIN WITH BILATERAL SCIATICA: ICD-10-CM

## 2023-04-07 DIAGNOSIS — M54.16 LUMBAR RADICULOPATHY, CHRONIC: ICD-10-CM

## 2023-04-07 DIAGNOSIS — M21.372 LEFT FOOT DROP: Primary | ICD-10-CM

## 2023-04-07 DIAGNOSIS — M54.42 ACUTE BILATERAL LOW BACK PAIN WITH BILATERAL SCIATICA: ICD-10-CM

## 2023-04-07 NOTE — PROGRESS NOTES
"Daily Note     Today's date: 2023  Patient name: Beatriz Holcomb  : 1960  MRN: [id number]  Referring provider: Maci Choi, PT  Dx:   Encounter Diagnosis     ICD-10-CM    1  Left foot drop  M21 372       2  Lumbar radiculopathy, chronic  M54 16       3  Acute bilateral low back pain with bilateral sciatica  M54 42     M54 41                      Subjective: Patient stated \"Even my co workers see a difference; difference in my walking, what I am able to do, that I couldn't do before  Therapy really helps! 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 )    [employer]  Access Code: [email or web address]        Manuals 4/7    3/10 3/15 3/17 3/22 3/24 3/31   FOTO     db                     HSS bilat             Left DF AAROM LA    db LA LA LA LA db                MRE;s left foot LA    db LA LA LA LA db                Neuro Re-Ed             Prone gs :03  20x    3s x 20 :03  20x :03  20x :03  20x :03  20x 3s x 20    Prone hip ext R 3#  L 2#  20x each    R 3#   L 0 # x 20 ea R 3#  L 0#  20x each R 3#  L 0#  20x R 3#  L 0#  20x   R 3#  L 0#  20x  R3#  L 1# x 20   Prone hs curls  R 3#  L 2#  20x each    3# R  0# L x 20 ea  R 3#  L 0#  20x each R 3#  L 0#  20x each R 3#  L 0#  20x each R 3#  L 0#  20x each R 3# L 1# 20x ea    Prone hip ext w/ knee flexed  R 3#  L 2#  20x each    3# R 0# L 20x ea  R 3#  L 0#  20x each R 3#  L 0#  20x R 3#  L 0#  20x R 3#  L 0#  20x  R  3# L 1# x 20 ea   Bridging  Blue TB  20x    btb x 20x BTB  20x  BTB  20x " "BTB  20x  BTB  20x btb x 20   HL TB Hip abd  Blue TB  20x    btb x 20  BTB  20x BTB  20x BTB  20x  BTB  20x btb x 20   Clamshells  Blue TB  20x    btb x 20  BTB  20x BTB  20x BTB  20x BTB  20x btb x 20                 TG L10  20x3    20 x 3  20x3 20x3 20x3 20x3 20 x 3   Step ups  F/L 8\" + foam  20x each    8\" foam x 20 ea 8\"+ foam  20x each 8\"+ foam  20x each 8\"  +foam  20x each 8\" + foam  20x  8\" + foam x 20 ea    TRX squats 20x    20x   20x 20x 20x 20x                Ther Ex             bike 10'    10'  10' 10' 10' 10' 10'                              Press ups 20x    10x 10x 10x 20x 20x 20x    Loc and sag 20x    10x 10x 10x 20x 20x 20x                             B hss w/ strap :20  3x each         20s x 3 bilat                Side stepping TB GTB  :30  3x    gtb 30' x 2  GTB  30'x2 GTB  30'x2 GTB  30'x3 GTB  30'x3 gtb 30' x 3                             Stand hip abd, hs curls, ext                          prostretch  :20  3x    20s x 3 :20  3x :20 3x  :20  3x :20  3x 20s x 3                                                                    Ther Activity                                       Gait Training                                       Modalities                                                               "

## 2023-04-08 DIAGNOSIS — I10 PRIMARY HYPERTENSION: ICD-10-CM

## 2023-04-08 DIAGNOSIS — E11.9 TYPE 2 DIABETES MELLITUS WITHOUT COMPLICATION, WITHOUT LONG-TERM CURRENT USE OF INSULIN (HCC): ICD-10-CM

## 2023-04-08 RX ORDER — LISINOPRIL 30 MG/1
TABLET ORAL
Qty: 30 TABLET | Refills: 0 | Status: SHIPPED | OUTPATIENT
Start: 2023-04-08

## 2023-04-08 RX ORDER — GLIMEPIRIDE 2 MG/1
TABLET ORAL
Qty: 90 TABLET | Refills: 0 | Status: SHIPPED | OUTPATIENT
Start: 2023-04-08

## 2023-04-28 ENCOUNTER — OFFICE VISIT (OUTPATIENT)
Dept: PHYSICAL THERAPY | Facility: CLINIC | Age: 63
End: 2023-04-28

## 2023-04-28 DIAGNOSIS — M21.372 LEFT FOOT DROP: ICD-10-CM

## 2023-04-28 DIAGNOSIS — M54.42 ACUTE BILATERAL LOW BACK PAIN WITH BILATERAL SCIATICA: ICD-10-CM

## 2023-04-28 DIAGNOSIS — M54.16 LUMBAR RADICULOPATHY, CHRONIC: Primary | ICD-10-CM

## 2023-04-28 DIAGNOSIS — M54.41 ACUTE BILATERAL LOW BACK PAIN WITH BILATERAL SCIATICA: ICD-10-CM

## 2023-04-28 NOTE — PROGRESS NOTES
"Daily Note     Today's date: 2023  Patient name: Renny Larson  : 1960  MRN: [id number]  Referring provider: Jazmine Wakefield PT  Dx:   Encounter Diagnosis     ICD-10-CM    1  Lumbar radiculopathy, chronic  M54 16       2  Left foot drop  M21 372       3  Acute bilateral low back pain with bilateral sciatica  M54 42     M54 41                      Subjective: Pt states his legs are sore from working  He notes he tripped over a wire with LLE at work but denies falling and complications from this  Objective: See treatment diary below      Assessment: Challenged with stabilization on LLE during mariama walking  No issues with LLE clearance  Pt demonstrates decreased LLE endurance during exercise  Patient completes charted exercises without c/o pain or discomfort  Plan: Continue per plan of care  Precautions: L4-5 radiculopathy, 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 )    [email or web address]  Access Code: [email or web address]        Manuals 4/7 4/14 4/20 4/28 3/10 3/15 3/17 3/22 3/24 3/31   FOTO   db  db                     HSS bilat             Left DF AAROM LA LA db AF db LA LA LA LA db                MRE;s left foot LA LA db AF db LA LA LA LA db                Neuro Re-Ed                          TG L10  20x3 20x3 20 x 3  3x20 20 x 3  20x3 20x3 20x3 20x3 20 x 3   Step ups  F/L 8\" + foam  20x each 8\" + foam   20x each 8\" / foam x 20 ea  8\" foam x20 ea 8\" foam x 20 ea 8\"+ foam  20x each 8\"+ foam  20x each 8\"  +foam  20x each 8\" + foam  20x  8\" + foam x 20 ea    TRX squats 20x 20x  20 x  2x10 20x   20x 20x 20x 20x   hurdles    Fw/lat 2 laps         Ther Ex             bike 10' 10' 10' 10' 10'  10' 10' 10' 10' 10'    Box lifts table - floor    0# x 10 ea direction 0# x10 ea direction         Prone hip extension    2# 2x10                                   B hss w/ strap :20  3x each :20  3x each 20s x 3  20\"x3      20s x 3 bilat   Jan Stretch  :20  3x each 20s x 3  20\"x3         Side " "stepping TB GTB  :30  3x GTB  30'x3 gtb x 3  GTB 2 laps (new floor) gtb 30' x 2  GTB  30'x2 GTB  30'x2 GTB  30'x3 GTB  30'x3 gtb 30' x 3   Monster tb walks   gtb x 2  GTB 2 laps                                                prostretch  :20  3x :20  3x 20s x 3 bilat 20\"x3 20s x 3 :20  3x :20 3x  :20  3x :20  3x 20s x 3                                                                    Ther Activity                                       Gait Training                                       Modalities                                                       "

## 2023-04-29 ENCOUNTER — ANESTHESIA (OUTPATIENT)
Dept: GASTROENTEROLOGY | Facility: HOSPITAL | Age: 63
End: 2023-04-29

## 2023-04-29 ENCOUNTER — HOSPITAL ENCOUNTER (OUTPATIENT)
Dept: GASTROENTEROLOGY | Facility: HOSPITAL | Age: 63
Setting detail: OUTPATIENT SURGERY
Discharge: HOME | End: 2023-04-29

## 2023-04-29 ENCOUNTER — ANESTHESIA EVENT (OUTPATIENT)
Dept: GASTROENTEROLOGY | Facility: HOSPITAL | Age: 63
End: 2023-04-29

## 2023-04-29 VITALS
RESPIRATION RATE: 18 BRPM | TEMPERATURE: 97.8 F | HEIGHT: 70 IN | WEIGHT: 167.55 LBS | SYSTOLIC BLOOD PRESSURE: 124 MMHG | DIASTOLIC BLOOD PRESSURE: 83 MMHG | HEART RATE: 96 BPM | BODY MASS INDEX: 23.99 KG/M2 | OXYGEN SATURATION: 96 %

## 2023-04-29 DIAGNOSIS — R10.13 EPIGASTRIC DISCOMFORT: ICD-10-CM

## 2023-04-29 DIAGNOSIS — Z12.11 COLON CANCER SCREENING: ICD-10-CM

## 2023-04-29 DIAGNOSIS — R63.4 WEIGHT LOSS: ICD-10-CM

## 2023-04-29 LAB — GLUCOSE SERPL-MCNC: 116 MG/DL (ref 65–140)

## 2023-04-29 RX ORDER — PANTOPRAZOLE SODIUM 40 MG/1
40 TABLET, DELAYED RELEASE ORAL DAILY
Qty: 30 TABLET | Refills: 2 | Status: SHIPPED | OUTPATIENT
Start: 2023-04-29

## 2023-04-29 RX ORDER — PROPOFOL 10 MG/ML
INJECTION, EMULSION INTRAVENOUS AS NEEDED
Status: DISCONTINUED | OUTPATIENT
Start: 2023-04-29 | End: 2023-04-29

## 2023-04-29 RX ORDER — SODIUM CHLORIDE, SODIUM LACTATE, POTASSIUM CHLORIDE, CALCIUM CHLORIDE 600; 310; 30; 20 MG/100ML; MG/100ML; MG/100ML; MG/100ML
INJECTION, SOLUTION INTRAVENOUS CONTINUOUS PRN
Status: DISCONTINUED | OUTPATIENT
Start: 2023-04-29 | End: 2023-04-29

## 2023-04-29 RX ORDER — LIDOCAINE HYDROCHLORIDE 20 MG/ML
INJECTION, SOLUTION EPIDURAL; INFILTRATION; INTRACAUDAL; PERINEURAL AS NEEDED
Status: DISCONTINUED | OUTPATIENT
Start: 2023-04-29 | End: 2023-04-29

## 2023-04-29 RX ADMIN — PROPOFOL 50 MG: 10 INJECTION, EMULSION INTRAVENOUS at 08:48

## 2023-04-29 RX ADMIN — PROPOFOL 20 MG: 10 INJECTION, EMULSION INTRAVENOUS at 08:52

## 2023-04-29 RX ADMIN — PROPOFOL 50 MG: 10 INJECTION, EMULSION INTRAVENOUS at 08:45

## 2023-04-29 RX ADMIN — LIDOCAINE HYDROCHLORIDE 100 MG: 20 INJECTION, SOLUTION EPIDURAL; INFILTRATION; INTRACAUDAL; PERINEURAL at 08:40

## 2023-04-29 RX ADMIN — PROPOFOL 20 MG: 10 INJECTION, EMULSION INTRAVENOUS at 08:50

## 2023-04-29 RX ADMIN — PROPOFOL 120 MG: 10 INJECTION, EMULSION INTRAVENOUS at 08:40

## 2023-04-29 RX ADMIN — SODIUM CHLORIDE, SODIUM LACTATE, POTASSIUM CHLORIDE, AND CALCIUM CHLORIDE: .6; .31; .03; .02 INJECTION, SOLUTION INTRAVENOUS at 08:31

## 2023-04-29 RX ADMIN — PROPOFOL 50 MG: 10 INJECTION, EMULSION INTRAVENOUS at 08:46

## 2023-04-29 RX ADMIN — PROPOFOL 30 MG: 10 INJECTION, EMULSION INTRAVENOUS at 08:42

## 2023-04-29 NOTE — ANESTHESIA POSTPROCEDURE EVALUATION
Post-Op Assessment Note    CV Status:  Stable    Pain management: adequate     Mental Status:  Sleepy and arousable   Hydration Status:  Euvolemic   PONV Controlled:  Controlled   Airway Patency:  Patent      Post Op Vitals Reviewed: Yes      Staff: CRNA, Anesthesiologist         No notable events documented      BP 98/62 (04/29/23 0855)    Temp 97 8 °F (36 6 °C) (04/29/23 0855)    Pulse 102 (04/29/23 0855)   Resp 18 (04/29/23 0855)    SpO2 96 % (04/29/23 0855)

## 2023-04-29 NOTE — ANESTHESIA PREPROCEDURE EVALUATION
Procedure:  EGD  COLONOSCOPY    Relevant Problems   CARDIO   (+) Mixed hyperlipidemia   (+) Primary hypertension      ENDO   (+) Type 2 diabetes mellitus without complication, without long-term current use of insulin (HCC)        Physical Exam    Airway    Mallampati score: II  TM Distance: >3 FB  Neck ROM: full     Dental   No notable dental hx     Cardiovascular      Pulmonary      Other Findings        Anesthesia Plan  ASA Score- 2     Anesthesia Type- IV sedation with anesthesia with ASA Monitors  Additional Monitors:   Airway Plan:           Plan Factors-Exercise tolerance (METS): >4 METS  Chart reviewed  Patient summary reviewed  Patient is a current smoker  Patient instructed to abstain from smoking on day of procedure  Patient did not smoke on day of surgery  There is medical exclusion for perioperative obstructive sleep apnea risk education  Induction- intravenous  Postoperative Plan-     Informed Consent- Anesthetic plan and risks discussed with patient  I personally reviewed this patient with the CRNA  Discussed and agreed on the Anesthesia Plan with the CRNA  Nirmala Ruff

## 2023-04-29 NOTE — H&P
"History and Physical -  [facility]  Karri Gonzalez 61 y o  male MRN: [id number]                  HPI: Karri Gonzalez is a 61y o  year old male who presents for endoscopy and colonoscopy for colon cancer screening and weight loss      REVIEW OF SYSTEMS: Per the HPI, and otherwise unremarkable  Historical Information   Past Medical History:   Diagnosis Date    Diabetes mellitus ([address] )     Hyperlipidemia     Sciatica     Type 2 diabetes mellitus (HCC)      No past surgical history on file  Social History   Social History     Substance and Sexual Activity   Alcohol Use Yes     Social History     Substance and Sexual Activity   Drug Use Not Currently     Social History     Tobacco Use   Smoking Status Some Days    Types: Cigars   Smokeless Tobacco Never   Tobacco Comments    Occasionally     Family History   Problem Relation Age of Onset    Diabetes Mother     Alzheimer's disease Mother     Dementia Father     Prostate cancer Father     Hypertension Father     Hyperlipidemia Father        Meds/Allergies     (Not in a hospital admission)      No Known Allergies    Objective     Blood pressure 164/100, pulse 100, temperature (!) 97 4 °F (36 3 °C), temperature source Temporal, resp  rate 14, height 5' 10\" (1 778 m), weight 76 kg (167 lb 8 8 oz), SpO2 98 %  PHYSICAL EXAM    /100   Pulse 100   Temp (!) 97 4 °F (36 3 °C) (Temporal)   Resp 14   Ht 5' 10\" (1 778 m)   Wt 76 kg (167 lb 8 8 oz)   SpO2 98%   BMI 24 04 kg/m²       Gen: NAD  CV: RRR  CHEST: Clear  ABD: soft, NT/ND  EXT: no edema      ASSESSMENT/PLAN:  This is a 61y o  year old male here for endoscopy and colonoscopy, and he is stable and optimized for his procedure        "

## 2023-05-05 ENCOUNTER — OFFICE VISIT (OUTPATIENT)
Dept: PHYSICAL THERAPY | Facility: CLINIC | Age: 63
End: 2023-05-05

## 2023-05-05 DIAGNOSIS — M54.16 LUMBAR RADICULOPATHY, CHRONIC: Primary | ICD-10-CM

## 2023-05-05 DIAGNOSIS — M21.372 LEFT FOOT DROP: ICD-10-CM

## 2023-05-05 DIAGNOSIS — M54.42 ACUTE BILATERAL LOW BACK PAIN WITH BILATERAL SCIATICA: ICD-10-CM

## 2023-05-05 DIAGNOSIS — M54.41 ACUTE BILATERAL LOW BACK PAIN WITH BILATERAL SCIATICA: ICD-10-CM

## 2023-05-05 NOTE — PROGRESS NOTES
"Daily Note     Today's date: 2023  Patient name: Kalani Mccabe  : 1960  MRN: [id number]  Referring provider: Brian Abrams, PT  Dx:   Encounter Diagnosis     ICD-10-CM    1  Lumbar radiculopathy, chronic  M54 16       2  Left foot drop  M21 372       3  Acute bilateral low back pain with bilateral sciatica  M54 42     M54 41                      Subjective: \"I'm doing better, getting stronger\"  Patient reported working late into AM hours last night with resultant general fatigue  Objective: See treatment diary below      Assessment: Tolerated treatment well  Patient demonstrated fatigue post treatment, exhibited good technique with therapeutic exercises and would benefit from continued PT      Plan: Continue per plan of care  Progress treatment as tolerated  Precautions: L4-5 radiculopathy, 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 )    [email or web address]  Access Code: [email or web address]        Manuals         FOTO   db                       HSS bilat             Left DF AAROM LA LA db AF LA                     MRE;s left foot LA LA db AF LA                     Neuro Re-Ed                          TG L10  20x3 20x3 20 x 3  3x20 20x3        Step ups  F/L 8\" + foam  20x each 8\" + foam   20x each 8\" / foam x 20 ea  8\" foam x20 ea 8\"  Foam  20x each        TRX squats 20x 20x  20 x  2x10 20x         hurdles    Fw/lat 2 laps 6 laps  fwd        Ther Ex             bike 10' 10' 10' 10' 10'        Box lifts table - floor    0# x 10 ea direction 0# x10 ea direction 5#  10x each        Prone hip extension    2# 2x10 2#  10x2 each                                  B hss w/ strap :20  3x each :20  3x each 20s x 3  20\"x3 :20  3x         Jan Stretch  :20  3x each 20s x 3  20\"x3 :20  3x        Side stepping TB GTB  :30  3x GTB  30'x3 gtb x 3  GTB 2 laps (new floor) GTB  2 laps          Monster tb walks   gtb x 2  GTB 2 laps GTB  2 laps                                             " "  prostretch  :20  3x :20  3x 20s x 3 bilat 20\"x3 :20  3x                                                                         Ther Activity                                       Gait Training                                       Modalities                                                         "

## 2023-05-08 ENCOUNTER — TELEPHONE (OUTPATIENT)
Dept: GASTROENTEROLOGY | Facility: CLINIC | Age: 63
End: 2023-05-08

## 2023-05-08 NOTE — TELEPHONE ENCOUNTER
----- Message from Caye Canavan, MD sent at 5/8/2023  7:19 AM EDT -----  Please review with the patient

## 2023-05-12 ENCOUNTER — OFFICE VISIT (OUTPATIENT)
Dept: PHYSICAL THERAPY | Facility: CLINIC | Age: 63
End: 2023-05-12

## 2023-05-12 DIAGNOSIS — M54.41 ACUTE BILATERAL LOW BACK PAIN WITH BILATERAL SCIATICA: ICD-10-CM

## 2023-05-12 DIAGNOSIS — M54.42 ACUTE BILATERAL LOW BACK PAIN WITH BILATERAL SCIATICA: ICD-10-CM

## 2023-05-12 DIAGNOSIS — M21.372 LEFT FOOT DROP: ICD-10-CM

## 2023-05-12 DIAGNOSIS — M54.16 LUMBAR RADICULOPATHY, CHRONIC: Primary | ICD-10-CM

## 2023-05-12 NOTE — PROGRESS NOTES
"Daily Note     Today's date: 2023  Patient name: Eugene Samuel  : 1960  MRN: [id number]  Referring provider: Cortes Caballero, PT  Dx:   Encounter Diagnosis     ICD-10-CM    1  Lumbar radiculopathy, chronic  M54 16       2  Left foot drop  M21 372       3  Acute bilateral low back pain with bilateral sciatica  M54 42     M54 41                      Subjective: patient notes that he golfed yesterday -  Finding the rotation component difficult,but did well overall  Notes that he is a bit sore today from that  Objective: See treatment diary below      Assessment: Tolerated treatment well  Patient demonstrated fatigue post treatment, exhibited good technique with therapeutic exercises and would benefit from continued PT   Increased mobility and strength left ankle all planes noted today    Better balance control and gait quality today         Plan: Continue per plan of care  Progress treatment as tolerated  Precautions: L4-5 radiculopathy, 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 )    stSigndat  Access Code: [email or web address]        Manuals        FOTO   db                       HSS bilat             Left DF AAROM LA LA db AF LA db                    MRE;s left foot LA LA db AF LA db                    Neuro Re-Ed                          TG L10  20x3 20x3 20 x 3  3x20 20x3 20 x 3        Step ups  F/L 8\" + foam  20x each 8\" + foam   20x each 8\" / foam x 20 ea  8\" foam x20 ea 8\"  Foam  20x each 8\" foam x 20 ea        TRX squats 20x 20x  20 x  2x10 20x  20x       hurdles    Fw/lat 2 laps 6 laps  fwd Fwd/ lat x 6 laps        Ther Ex             bike 10' 10' 10' 10' 10' 10'        Box lifts table - floor    0# x 10 ea direction 0# x10 ea direction 5#  10x each 10# x 10 ea        Prone hip extension, HS curls    2# 2x10 2#  10x2 each 2 5# x 20 ea                                  B hss w/ strap :20  3x each :20  3x each 20s x 3  20\"x3 :20  3x  20s x 3      " "  Jan Stretch  :20  3x each 20s x 3  20\"x3 :20  3x 20s x 3       Side stepping TB GTB  :30  3x GTB  30'x3 gtb x 3  GTB 2 laps (new floor) GTB  2 laps   gtb x 2 30'       Monster tb walks   gtb x 2  GTB 2 laps GTB  2 laps gtb   30' x 2                                               prostretch  :20  3x :20  3x 20s x 3 bilat 20\"x3 :20  3x 20s x 3                                                                         Ther Activity                                       Gait Training                                       Modalities                                                         "

## 2023-05-16 ENCOUNTER — OFFICE VISIT (OUTPATIENT)
Dept: FAMILY MEDICINE CLINIC | Facility: CLINIC | Age: 63
End: 2023-05-16

## 2023-05-16 VITALS
HEART RATE: 88 BPM | DIASTOLIC BLOOD PRESSURE: 86 MMHG | SYSTOLIC BLOOD PRESSURE: 140 MMHG | WEIGHT: 174.6 LBS | BODY MASS INDEX: 25 KG/M2 | TEMPERATURE: 96.7 F | HEIGHT: 70 IN | OXYGEN SATURATION: 99 %

## 2023-05-16 DIAGNOSIS — E78.2 MIXED HYPERLIPIDEMIA: ICD-10-CM

## 2023-05-16 DIAGNOSIS — H93.11 TINNITUS OF RIGHT EAR: ICD-10-CM

## 2023-05-16 DIAGNOSIS — I10 PRIMARY HYPERTENSION: Primary | ICD-10-CM

## 2023-05-16 DIAGNOSIS — E11.9 TYPE 2 DIABETES MELLITUS WITHOUT COMPLICATION, WITHOUT LONG-TERM CURRENT USE OF INSULIN (HCC): ICD-10-CM

## 2023-05-16 LAB — SL AMB POCT HEMOGLOBIN AIC: 6 (ref ?–6.5)

## 2023-05-16 RX ORDER — LISINOPRIL 40 MG/1
40 TABLET ORAL DAILY
Qty: 30 TABLET | Refills: 0 | Status: SHIPPED | OUTPATIENT
Start: 2023-05-16

## 2023-05-16 NOTE — ASSESSMENT & PLAN NOTE
Hemoglobin A1c within goal today  To continue with diabetic diet, physical activity, weight loss      Lab Results   Component Value Date    HGBA1C 6 0 05/16/2023

## 2023-05-16 NOTE — PROGRESS NOTES
Name: Aileen Zaragoza      : 1960      MRN: [id number]  Encounter Provider: YESENIA Anderson  Encounter Date: 2023   Encounter department: [facility] [address] [place] [address]  Primary hypertension  Assessment & Plan:  Blood pressure not at goal today  As per patient, blood pressures at home have been 140's over 90's on average  Patient denies symptoms related to elevated blood pressures  At this time will increase lisinopril from 30 mg to 40 mg  Discussed with patient importance of heart healthy diet to continue with physical activity and weight loss  We will reevaluate in 4 weeks  Orders:  -     Hemoglobin A1C; Future; Expected date: 2023  -     Comprehensive metabolic panel; Future; Expected date: 2023  -     CBC and differential; Future; Expected date: 2023  -     TSH, 3rd generation with Free T4 reflex; Future; Expected date: 2023  -     Lipid Panel with Direct LDL reflex; Future; Expected date: 2023  -     lisinopril (ZESTRIL) 40 mg tablet; Take 1 tablet (40 mg total) by mouth daily    2  Tinnitus of right ear  Comments:  No cerumen impaction noted on exam today  Will refer to ENT for further evaluation  Advised to obstain from loud noises, avoid vigorous Q-tip use  Orders:  -     Ambulatory Referral to Otolaryngology; Future    3  Type 2 diabetes mellitus without complication, without long-term current use of insulin ([facility])  Assessment & Plan:  Hemoglobin A1c within goal today  To continue with diabetic diet, physical activity, weight loss  Lab Results   Component Value Date    HGBA1C 6 0 2023       Orders:  -     POCT hemoglobin A1c  -     Hemoglobin A1C; Future; Expected date: 2023  -     Comprehensive metabolic panel; Future; Expected date: 2023  -     CBC and differential; Future; Expected date: 2023  -     TSH, 3rd generation with Free T4 reflex;  Future; Expected date: 09/13/2023  -     Lipid Panel with Direct LDL reflex; Future; Expected date: 09/13/2023    4  Mixed hyperlipidemia  Assessment & Plan:  Patient stopped atorvastatin due to GI side effects  We will repeat lipid panel  Orders:  -     Lipid Panel with Direct LDL reflex; Future; Expected date: 09/13/2023    BMI Counseling: Body mass index is 25 05 kg/m²  The BMI is above normal  Nutrition recommendations include decreasing portion sizes, encouraging healthy choices of fruits and vegetables, consuming healthier snacks, limiting drinks that contain sugar, moderation in carbohydrate intake, increasing intake of lean protein, reducing intake of saturated and trans fat and reducing intake of cholesterol  Exercise recommendations include exercising 3-5 times per week and strength training exercises  No pharmacotherapy was ordered  Rationale for BMI follow-up plan is due to patient being overweight or obese  Depression Screening and Follow-up Plan: Patient was screened for depression during today's encounter  They screened negative with a PHQ-2 score of 0  Subjective      Patient presents to the office for 2-month follow-up  Patient continues to monitor blood pressure at home, lisinopril was increased from 20 mg to 30 mg during last visit  Blood pressures at home have been 140s-90 in the morning,  then in the evening 130's/80's  Patient notes on average blood pressure has been 140s over 90s  Patient denies symptoms related to elevated blood pressure  Patient denies headaches, dizziness, chest pain, palpitations, visual disturbances  Has been compliant with blood pressure medication  Continues to increase physical activity and lose weight  Patient with complaints of ringing in right ear  Notes over the past 2-3 months ringing has been more constant  Patient denies ear pain, fever, congestion, discharge, decreased hearing  Denies recent trauma to ear        Review of Systems   Constitutional: Negative for activity change, appetite change, diaphoresis and unexpected weight change  HENT: Positive for tinnitus (right ear)  Negative for congestion, ear discharge, ear pain, hearing loss, sore throat and trouble swallowing  Eyes: Negative for photophobia and visual disturbance  Respiratory: Negative for cough and shortness of breath  Cardiovascular: Negative for chest pain, palpitations and leg swelling  Gastrointestinal: Negative for nausea and vomiting  Endocrine: Negative for polydipsia, polyphagia and polyuria  Genitourinary: Negative for decreased urine volume, frequency and urgency  Musculoskeletal: Negative for arthralgias and myalgias  Skin: Negative for color change and rash  Neurological: Negative for dizziness, weakness, light-headedness and headaches  Psychiatric/Behavioral: Negative for agitation and confusion  Current Outpatient Medications on File Prior to Visit   Medication Sig   • Blood Glucose Monitoring Suppl (OneTouch Verio Reflect) w/Device KIT Check blood sugars once daily  Please substitute with appropriate alternative as covered by patient's insurance  Dx: E11 65   • gabapentin (NEURONTIN) 300 mg capsule Take 1 capsule (300 mg total) by mouth 3 (three) times a day   • glimepiride (AMARYL) 2 mg tablet TAKE ONE TABLET BY MOUTH EVERY DAY WITH BREAKFAST   • glucose blood (OneTouch Verio) test strip Check blood sugars once daily  Please substitute with appropriate alternative as covered by patient's insurance  Dx: O69 43   • OneTouch Delica Lancets 60K MISC Check blood sugars once daily  Please substitute with appropriate alternative as covered by patient's insurance   Dx: E11 65   • pantoprazole (PROTONIX) 40 mg tablet Take 1 tablet (40 mg total) by mouth daily   • [DISCONTINUED] lisinopril (ZESTRIL) 30 mg tablet TAKE ONE TABLET BY MOUTH EVERY DAY   • atorvastatin (LIPITOR) 20 mg tablet Take 1 tablet (20 mg total) by mouth every evening       Objective     /86 "Pulse 88   Temp (!) 96 7 °F (35 9 °C) (Tympanic)   Ht 5' 10\" (1 778 m)   Wt 79 2 kg (174 lb 9 6 oz)   SpO2 99%   BMI 25 05 kg/m²     Physical Exam  Vitals reviewed  Constitutional:       General: He is not in acute distress  Appearance: Normal appearance  He is not ill-appearing  HENT:      Head: Normocephalic and atraumatic  Right Ear: Tympanic membrane, ear canal and external ear normal  There is no impacted cerumen  Left Ear: Tympanic membrane, ear canal and external ear normal  There is no impacted cerumen  Nose: Nose normal       Mouth/Throat:      Mouth: Mucous membranes are moist       Pharynx: Oropharynx is clear  Eyes:      Conjunctiva/sclera: Conjunctivae normal       Pupils: Pupils are equal, round, and reactive to light  Neck:      Vascular: No carotid bruit  Cardiovascular:      Rate and Rhythm: Normal rate and regular rhythm  Pulses: Normal pulses  Heart sounds: Normal heart sounds  No murmur heard  Pulmonary:      Effort: Pulmonary effort is normal       Breath sounds: Normal breath sounds  Abdominal:      General: Bowel sounds are normal       Palpations: Abdomen is soft  Tenderness: There is no abdominal tenderness  Musculoskeletal:         General: Normal range of motion  Cervical back: Normal range of motion and neck supple  Right lower leg: No edema  Left lower leg: No edema  Skin:     General: Skin is warm and dry  Capillary Refill: Capillary refill takes less than 2 seconds  Neurological:      General: No focal deficit present  Mental Status: He is alert and oriented to person, place, and time     Psychiatric:         Mood and Affect: Mood normal          Behavior: Behavior normal        YESENIA Elmore  "

## 2023-05-16 NOTE — PATIENT INSTRUCTIONS
Tinnitus   AMBULATORY CARE:   Tinnitus  is when you hear ringing, clicking, buzzing, or hissing in one or both ears  You may also hear whistling, chirping, or pulsing  It may be soft or loud, and at a low or high pitch  Tinnitus that lasts for longer than 6 months is considered chronic  Tinnitus may be caused by problems with your hearing system, including the parts of your brain that sort out sounds  Tinnitus may also be caused by a health condition, such as Ménière disease  Call 911 if: You feel like hurting yourself or others because of the constant noise  Contact your healthcare provider if:   You have headaches  You are tired and have trouble concentrating or remembering things  You have more anxiety or stress than usual     You have deep sadness or depression  You have trouble falling asleep or staying asleep  Your symptoms do not go away or they get worse  You have questions or concerns about your condition or care  Treatment for tinnitus  may not be needed  Your symptoms may only appear when you are anxious or stressed  Your healthcare provider may stop certain medicines that may be causing your tinnitus  You may also need medicines to help decrease your symptoms  The following can help treat or manage tinnitus:  Counseling  can help you learn ways to relax, decrease stress, and make your tinnitus less noticeable  Cognitive behavioral therapy  helps you understand your condition  Your therapist will help you learn to cope with tinnitus  You may also learn new ways to relax and retrain your behavior to decrease your symptoms  Sound therapy,  such as white noise machines, may help cover your tinnitus with a pleasant sound  Sound therapy devices can help you fall asleep or help you relax  These devices can be worn in your ear or placed next to your bed at night  Hearing aids or cochlear implants  may help if you have hearing loss      Surgery  may be needed if your tinnitus is caused by abnormal blood vessels or a mass  Do not smoke  Nicotine decreases blood flow to your ear and can make your tinnitus worse  Do not use e-cigarettes or smokeless tobacco in place of cigarettes or to help you quit  They still contain nicotine  Ask your healthcare provider for information if you currently smoke and need help quitting  Decrease how much alcohol and caffeine you drink  Alcohol and caffeine can make your tinnitus worse  Prevent tinnitus:   Avoid exposure to loud noise,  such as loud music or power tools  Occasional exposure can still cause tinnitus  Move away from the noise or turn down the volume  Wear ear protection  when you are exposed to loud noises  Good ear protection includes ear plugs or headphones that reduce noise  Follow up with your doctor as directed:  Write down your questions so you remember to ask them during your visits  © Copyright [employer] 2022 Information is for End User's use only and may not be sold, redistributed or otherwise used for commercial purposes  The above information is an  only  It is not intended as medical advice for individual conditions or treatments  Talk to your doctor, nurse or pharmacist before following any medical regimen to see if it is safe and effective for you

## 2023-05-16 NOTE — ASSESSMENT & PLAN NOTE
Blood pressure not at goal today  As per patient, blood pressures at home have been 140's over 90's on average  Patient denies symptoms related to elevated blood pressures  At this time will increase lisinopril from 30 mg to 40 mg  Discussed with patient importance of heart healthy diet to continue with physical activity and weight loss  We will reevaluate in 4 weeks

## 2023-05-19 ENCOUNTER — OFFICE VISIT (OUTPATIENT)
Dept: PHYSICAL THERAPY | Facility: CLINIC | Age: 63
End: 2023-05-19

## 2023-05-19 DIAGNOSIS — M54.41 ACUTE BILATERAL LOW BACK PAIN WITH BILATERAL SCIATICA: ICD-10-CM

## 2023-05-19 DIAGNOSIS — M54.16 LUMBAR RADICULOPATHY, CHRONIC: Primary | ICD-10-CM

## 2023-05-19 DIAGNOSIS — M21.372 LEFT FOOT DROP: ICD-10-CM

## 2023-05-19 DIAGNOSIS — M54.42 ACUTE BILATERAL LOW BACK PAIN WITH BILATERAL SCIATICA: ICD-10-CM

## 2023-05-19 NOTE — PROGRESS NOTES
"Daily Note     Today's date: 2023  Patient name: Merrill Peabody  : 1960  MRN: [id number]  Referring provider: Tushar Bazzi PT  Dx:   Encounter Diagnosis     ICD-10-CM    1  Lumbar radiculopathy, chronic  M54 16       2  Left foot drop  M21 372       3  Acute bilateral low back pain with bilateral sciatica  M54 42     M54 41                      Subjective: Patient noted he is pleased with his improved ability to climb over obstacles at work without tripping and/or struggle to clear objects/hip circumduct  Objective: See treatment diary below      Assessment: Tolerated treatment demonstrating continued left LE weakness  Patient demonstrated fatigue post treatment, exhibited good technique with therapeutic exercises and would benefit from continued PT      Plan: Continue per plan of care  Progress treatment as tolerated  Precautions: L4-5 radiculopathy, 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    23 approved NA 23 42                                       Manuals       FOTO   db    DB                   HSS bilat             Left DF AAROM LA LA db AF LA db LA                   MRE;s left foot LA LA db AF LA db LA                   Neuro Re-Ed                          TG L10  20x3 20x3 20 x 3  3x20 20x3 20 x 3  20x3      Step ups  F/L 8\" + foam  20x each 8\" + foam   20x each 8\" / foam x 20 ea  8\" foam x20 ea 8\"  Foam  20x each 8\" foam x 20 ea  8\"  Foam  20x each      TRX squats 20x 20x  20 x  2x10 20x  20x 20x      hurdles    Fw/lat 2 laps 6 laps  fwd Fwd/ lat x 6 laps  Fwd/Lat  X 6 laps      Ther Ex             bike 10' 10' 10' 10' 10' 10'  10'      Box lifts table - floor    0# x 10 ea direction 0# x10 ea direction 5#  10x each 10# x 10 ea  10#  10x each      Prone hip extension, HS curls    2# 2x10 " "2#  10x2 each 2 5# x 20 ea  2 5#  20x                                  B hss w/ strap :20  3x each :20  3x each 20s x 3  20\"x3 :20  3x  20s x 3  :20  3x      Jan Stretch  :20  3x each 20s x 3  20\"x3 :20  3x 20s x 3 :20  3x      Side stepping TB GTB  :30  3x GTB  30'x3 gtb x 3  GTB 2 laps (new floor) GTB  2 laps   gtb x 2 30' GTB  30'x2      Monster tb walks   gtb x 2  GTB 2 laps GTB  2 laps gtb   30' x 2  GTB  30'x2                                             prostretch  :20  3x :20  3x 20s x 3 bilat 20\"x3 :20  3x 20s x 3  :20  3x                                                                        Ther Activity                                       Gait Training                                       Modalities                                                           "

## 2023-05-26 ENCOUNTER — OFFICE VISIT (OUTPATIENT)
Dept: PHYSICAL THERAPY | Facility: CLINIC | Age: 63
End: 2023-05-26

## 2023-05-26 DIAGNOSIS — M54.41 ACUTE BILATERAL LOW BACK PAIN WITH BILATERAL SCIATICA: ICD-10-CM

## 2023-05-26 DIAGNOSIS — M54.16 LUMBAR RADICULOPATHY, CHRONIC: Primary | ICD-10-CM

## 2023-05-26 DIAGNOSIS — M21.372 LEFT FOOT DROP: ICD-10-CM

## 2023-05-26 DIAGNOSIS — M54.42 ACUTE BILATERAL LOW BACK PAIN WITH BILATERAL SCIATICA: ICD-10-CM

## 2023-05-26 NOTE — PROGRESS NOTES
"Daily Note     Today's date: 2023  Patient name: Ana Luisa Dougherty  : 1960  MRN: [id number]  Referring provider: Clau Sarah PT  Dx:   Encounter Diagnosis     ICD-10-CM    1  Lumbar radiculopathy, chronic  M54 16       2  Left foot drop  M21 372       3  Acute bilateral low back pain with bilateral sciatica  M54 42     M54 41                      Subjective: Patient notes that he played golf and had to walk some hills which he was able to manage   Notes fatigue with additions today       Objective: See treatment diary below      Assessment: Tolerated treatment better overall  Challenged with curtsy ex today  Plan: Continue per plan of care  Progress treatment as tolerated  Precautions: L4-5 radiculopathy, 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    23 approved NA 23 42                                       Manuals      FOTO   db    DB                   HSS bilat             Left DF AAROM LA LA db AF LA db LA db                  MRE;s left foot LA LA db AF LA db LA db                  Neuro Re-Ed                          TG L10  20x3 20x3 20 x 3  3x20 20x3 20 x 3  20x3 20 x 3     Step ups  F/L 8\" + foam  20x each 8\" + foam   20x each 8\" / foam x 20 ea  8\" foam x20 ea 8\"  Foam  20x each 8\" foam x 20 ea  8\"  Foam  20x each 8\" foam x 20 ea      TRX squats 20x 20x  20 x  2x10 20x  20x 20x 20x     TRX Curtsy        20x     hurdles    Fw/lat 2 laps 6 laps  fwd Fwd/ lat x 6 laps  Fwd/Lat  X 6 laps      Ther Ex             bike 10' 10' 10' 10' 10' 10'  10' 10'      Box lifts table - floor    0# x 10 ea direction 0# x10 ea direction 5#  10x each 10# x 10 ea  10#  10x each      Prone hip extension, HS curls    2# 2x10 2#  10x2 each 2 5# x 20 ea  2 5#  20x                                  B hss w/ strap " ":20  3x each :20  3x each 20s x 3  20\"x3 :20  3x  20s x 3  :20  3x 20s x 3     Jan Stretch  :20  3x each 20s x 3  20\"x3 :20  3x 20s x 3 :20  3x 20s x 3      Side stepping TB GTB  :30  3x GTB  30'x3 gtb x 3  GTB 2 laps (new floor) GTB  2 laps   gtb x 2 30' GTB  30'x2 btb x 2 30'      Monster tb walks   gtb x 2  GTB 2 laps GTB  2 laps gtb   30' x 2  GTB  30'x2 btb x2 30'                  CLIF walk outs         15# x 5 ea                   prostretch  :20  3x :20  3x 20s x 3 bilat 20\"x3 :20  3x 20s x 3  :20  3x  20s x 3                                                                       Ther Activity                                       Gait Training                                       Modalities                                                           "

## 2023-06-02 ENCOUNTER — EVALUATION (OUTPATIENT)
Dept: PHYSICAL THERAPY | Facility: CLINIC | Age: 63
End: 2023-06-02

## 2023-06-02 DIAGNOSIS — M21.372 LEFT FOOT DROP: ICD-10-CM

## 2023-06-02 DIAGNOSIS — M54.42 ACUTE BILATERAL LOW BACK PAIN WITH BILATERAL SCIATICA: ICD-10-CM

## 2023-06-02 DIAGNOSIS — M54.16 LUMBAR RADICULOPATHY, CHRONIC: Primary | ICD-10-CM

## 2023-06-02 DIAGNOSIS — M54.41 ACUTE BILATERAL LOW BACK PAIN WITH BILATERAL SCIATICA: ICD-10-CM

## 2023-06-02 NOTE — PROGRESS NOTES
PT Discharge    Today's date: 2023  Patient name: Kiara Huggins  : 1960  MRN: [id number]  Referring provider: Afia Calderon PT  Dx:   Encounter Diagnosis     ICD-10-CM    1  Lumbar radiculopathy, chronic  M54 16       2  Left foot drop  M21 372       3  Acute bilateral low back pain with bilateral sciatica  M54 42     M54 41             Assessment  Assessment details: Patient has benefited maximally from PT at this time  He is independent and competent with HEP and will be discharged to such at this time    Thank you for this referral    Good progress towards original goals  Impairments: abnormal gait, abnormal muscle firing, abnormal or restricted ROM, abnormal movement, activity intolerance, impaired physical strength, lacks appropriate home exercise program and pain with function  Understanding of Dx/Px/POC: good  Goals  STG   1  Patient will demonstrate independence and competence with HEP 2 -4 weeks   MET   2  Patient will report > 25-50% reduced pain/ weakness bilateral anterior thighs 2-4 weeks    MET    LTG   1  Patient will report improvements with both functional and recreational abilities  4-6 weeks    MET  2  Patient will demonstrate improved motor function  4-6 weeks  ONGOING  3  Reducing left foot drop  4-8 weeks Partially  MET  4  Improved flexibility bilateral HS  4-8 weeks  Partially met    Plan  Planned therapy interventions: home exercise program  Treatment plan discussed with: patient        Subjective Evaluation    Pain  Current pain ratin  At worst pain ratin  Quality: dull ache and radiating  Progression: improved    Social Support  Lives with: spouse    Working: [employer] x 2 months - independent       Diagnostic Tests  CT scan: normal  Treatments  Current treatment: injection treatment  Patient Goals  Patient goals for therapy: increased strength, return to work, decreased pain and increased motion          Objective     Concurrent Complaints  Negative for night pain, disturbed sleep, bladder dysfunction, bowel dysfunction and saddle (S4) numbness    Postural Observations  Seated posture: fair  Standing posture: fair  Correction of posture: has no consistent effect        Palpation   Left   No palpable tenderness to the lumbar paraspinals  Right   No palpable tenderness to the lumbar paraspinals  Tenderness     Lumbar Spine  No tenderness in the spinous process  Left Hip   No tenderness in the PSIS  Right Hip   No tenderness in the PSIS       Neurological Testing     Sensation     Lumbar   Left   Intact: light touch    Right   Intact: light touch    Active Range of Motion     Lumbar   Flexion:  Restriction level: minimal  Extension:  Restriction level: minimal    Joint Play     Hypomobile: L1, L2, L3, L4, L5 and S1   Mechanical Assessment    Cervical      Thoracic      Lumbar    Standing flexion: repeated movements   Pain location:no change  Lying flexion: repeated movements  Pain location: no change  Standing extension: repeated movements  Pain location: no change  Lying extension: repeated movements  Pain location: no change    Strength/Myotome Testing     Left Hip   Planes of Motion   Flexion: 4  Extension: 4  Abduction: 4  External rotation: 4  Internal rotation: 4    Right Hip   Planes of Motion   Flexion: 4+  Extension: 4+  External rotation: 4+  Internal rotation: 4+    Left Knee   Flexion: 5  Extension: 4+    Right Knee   Flexion: 5  Extension: 5    Left Ankle/Foot   Dorsiflexion: 3+  Plantar flexion: 4+  Inversion: 4-  Eversion: 3+  Great toe flexion: 3  Great toe extension: 3    Right Ankle/Foot   Dorsiflexion: 5  Plantar flexion: 5  Inversion: 5  Eversion: 5    Additional Strength Details  Unable to toe walk/ heel raise independently on left - same on reassement    Tests     Lumbar     Left   Negative crossed SLR, femoral stretch, passive SLR and slump test      Right   Negative crossed SLR, femoral stretch, passive "SLR and slump test      Left Hip   Negative JUAN C and FADIR  Right Hip   Negative JUAN C and FADIR  Additional Tests Details  slr = 70 degrees bilaterally - hs tightness only elicited  - improved on reassessment     General Comments:      Lumbar Comments  GAIT - (+) left foot drop, decreased push off / tends to wb laterally on left side  - slight improvements only on reassessment                Precautions: L4-5 radiculopathy, 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    6/21/23 approved NA 1/16/23 7/21/23 42                                       Manuals 4/7 4/14 4/20 4/28 5/5 5/12 5/19 5/26 6/2    FOTO   db    DB  db                 HSS bilat             Left DF AAROM LA LA db AF LA db LA db db                 MRE;s left foot LA LA db AF LA db LA db db                 Neuro Re-Ed                          TG L10  20x3 20x3 20 x 3  3x20 20x3 20 x 3  20x3 20 x 3 20 x 3     Step ups  F/L 8\" + foam  20x each 8\" + foam   20x each 8\" / foam x 20 ea  8\" foam x20 ea 8\"  Foam  20x each 8\" foam x 20 ea  8\"  Foam  20x each 8\" foam x 20 ea  8\" foam x 20     TRX squats 20x 20x  20 x  2x10 20x  20x 20x 20x 20x     TRX Curtsy        20x 20x    hurdles    Fw/lat 2 laps 6 laps  fwd Fwd/ lat x 6 laps  Fwd/Lat  X 6 laps      Ther Ex             bike 10' 10' 10' 10' 10' 10'  10' 10'  10'     Box lifts table - floor    0# x 10 ea direction 0# x10 ea direction 5#  10x each 10# x 10 ea  10#  10x each      Prone hip extension, HS curls    2# 2x10 2#  10x2 each 2 5# x 20 ea  2 5#  20x                                  B hss w/ strap :20  3x each :20  3x each 20s x 3  20\"x3 :20  3x  20s x 3  :20  3x 20s x 3 20s x 3    Jan Stretch  :20  3x each 20s x 3  20\"x3 :20  3x 20s x 3 :20  3x 20s x 3  20s x 3    Side stepping TB GTB  :30  3x GTB  30'x3 gtb x 3  GTB 2 laps (new floor) GTB  2 laps   gtb x " "2 30' GTB  30'x2 btb x 2 30'  btb x 3  30'     Monster tb walks   gtb x 2  GTB 2 laps GTB  2 laps gtb   30' x 2  GTB  30'x2 btb x2 30' btb x 3 30'                  CLIF walk outs         15# x 5 ea  17# 5x ea                  prostretch  :20  3x :20  3x 20s x 3 bilat 20\"x3 :20  3x 20s x 3  :20  3x  20s x 3  20s x 3                                                                     Ther Activity                                       Gait Training                                       Modalities                                                          "

## 2023-06-05 NOTE — PROGRESS NOTES
Pain Medicine Follow-Up Note    Assessment:  1  Lumbar radiculopathy    2  Peroneal neuropathy, left    3  Lumbar facet arthropathy        Plan:  No orders of the defined types were placed in this encounter  New Medications Ordered This Visit   Medications   • gabapentin (NEURONTIN) 300 mg capsule     Sig: Take 1 capsule (300 mg total) by mouth 3 (three) times a day     Dispense:  270 capsule     Refill:  1       My impressions and treatment recommendations were discussed in detail with the patient who verbalized understanding and had no further questions  This is a 24-year-old male presents her office with complaints notable low  He continues to do well with gabapentin 300mg 3 times daily  His left lower extremity radicular symptoms are still fairly well controlled since December 2022 epidural injection  He also has been engaging in physical therapy for his peroneal neuropathy and overall feels that his condition is better since the last time I saw him  We will continue gabapentin 300 mg 3 times daily  He will return in 6 months or sooner if medically necesssary  [place] Prescription Drug Monitoring Program report was reviewed and was appropriate       Complete risks and benefits including bleeding, infection, tissue reaction, nerve injury and allergic reaction were discussed  The approach was demonstrated using models and literature was provided  Verbal and written consent was obtained  Discharge instructions were provided  I personally saw and examined the patient and I agree with the above discussed plan of care  History of Present Illness:    Dayanara Jain is a 61 y o  male who presents to [facility] for interval re-evaluation of the above stated pain complaints  The patient has a past medical and chronic pain history as outlined in the assessment section   He was last seen on 1/20/2023 at which time he was continued on gabapentin 300 to milligrams 3 times daily   Still continues to do well from left L4 and L5 TFESI in December 2022  He is currently in physical therapy for left foot drop  Overall his symptoms are improved  Pain score 1 out of 10  He is here for gabapentin refill  Denies any significant issues with the medication  He reports he does continue to help his symptoms  Takes 300 mg 3 times daily  He also reports that when he is squatting for a prolonged period he reports that when he gets up he gets pain the outer left groin  It is a knife-like pain that goes away after about 20-30 seconds  Other than as stated above, the patient denies any interval changes in medications, medical condition, mental condition, symptoms, or allergies since the last office visit  Review of Systems:    Review of Systems      Past Medical History:   Diagnosis Date   • Colon polyp    • Diabetes mellitus ([facility] )    • Hyperlipidemia    • Sciatica    • Type 2 diabetes mellitus ([facility] )        Past Surgical History:   Procedure Laterality Date   • COLONOSCOPY         Family History   Problem Relation Age of Onset   • Diabetes Mother    • Alzheimer's disease Mother    • Dementia Father    • Prostate cancer Father    • Hypertension Father    • Hyperlipidemia Father        Social History     Occupational History   • Not on file   Tobacco Use   • Smoking status: Some Days     Types: Cigars   • Smokeless tobacco: Never   • Tobacco comments:     Occasionally   Vaping Use   • Vaping Use: Never used   Substance and Sexual Activity   • Alcohol use: Yes     Comment: few times a week   • Drug use: Not Currently   • Sexual activity: Not on file         Current Outpatient Medications:   •  Blood Glucose Monitoring Suppl (OneTouch Verio Reflect) w/Device KIT, Check blood sugars once daily  Please substitute with appropriate alternative as covered by patient's insurance   Dx: E11 65, Disp: 1 kit, Rfl: 0  •  gabapentin (NEURONTIN) 300 mg capsule, Take 1 capsule (300 mg total) by mouth 3 (three) times a day, Disp: 270 capsule, Rfl: 1  •  glimepiride (AMARYL) 2 mg tablet, TAKE ONE TABLET BY MOUTH EVERY DAY WITH BREAKFAST, Disp: 90 tablet, Rfl: 0  •  glucose blood (OneTouch Verio) test strip, Check blood sugars once daily  Please substitute with appropriate alternative as covered by patient's insurance  Dx: E11 65, Disp: 100 each, Rfl: 3  •  lisinopril (ZESTRIL) 40 mg tablet, Take 1 tablet (40 mg total) by mouth daily, Disp: 30 tablet, Rfl: 0  •  OneTouch Delica Lancets 73W MISC, Check blood sugars once daily  Please substitute with appropriate alternative as covered by patient's insurance  Dx: E11 65, Disp: 100 each, Rfl: 3  •  pantoprazole (PROTONIX) 40 mg tablet, Take 1 tablet (40 mg total) by mouth daily, Disp: 30 tablet, Rfl: 2  •  atorvastatin (LIPITOR) 20 mg tablet, Take 1 tablet (20 mg total) by mouth every evening, Disp: 90 tablet, Rfl: 1    No Known Allergies    Physical Exam:    /93 (BP Location: Left arm, Patient Position: Sitting, Cuff Size: Standard)   Pulse 96   Wt 81 5 kg (179 lb 9 6 oz)   BMI 25 77 kg/m²     Constitutional:normal, well developed, well nourished, alert, in no distress and non-toxic and no overt pain behavior  Eyes:anicteric  HEENT:grossly intact  Neck:supple, symmetric, trachea midline and no masses   Pulmonary:even and unlabored  Cardiovascular:No edema or pitting edema present  Skin:Normal without rashes or lesions and well hydrated  Psychiatric:Mood and affect appropriate  Neurologic:Cranial Nerves II-XII grossly intact  Musculoskeletal:normal      Imaging  No orders to display         No orders of the defined types were placed in this encounter

## 2023-06-07 ENCOUNTER — OFFICE VISIT (OUTPATIENT)
Dept: PAIN MEDICINE | Facility: CLINIC | Age: 63
End: 2023-06-07
Payer: COMMERCIAL

## 2023-06-07 VITALS
WEIGHT: 179.6 LBS | DIASTOLIC BLOOD PRESSURE: 93 MMHG | BODY MASS INDEX: 25.77 KG/M2 | HEART RATE: 96 BPM | SYSTOLIC BLOOD PRESSURE: 164 MMHG

## 2023-06-07 DIAGNOSIS — M47.816 LUMBAR FACET ARTHROPATHY: ICD-10-CM

## 2023-06-07 DIAGNOSIS — M54.16 LUMBAR RADICULOPATHY: Primary | ICD-10-CM

## 2023-06-07 DIAGNOSIS — G57.32 PERONEAL NEUROPATHY, LEFT: ICD-10-CM

## 2023-06-07 PROCEDURE — 99214 OFFICE O/P EST MOD 30 MIN: CPT | Performed by: STUDENT IN AN ORGANIZED HEALTH CARE EDUCATION/TRAINING PROGRAM

## 2023-06-07 RX ORDER — GABAPENTIN 300 MG/1
300 CAPSULE ORAL 3 TIMES DAILY
Qty: 270 CAPSULE | Refills: 1 | Status: SHIPPED | OUTPATIENT
Start: 2023-06-07

## 2023-06-14 ENCOUNTER — OFFICE VISIT (OUTPATIENT)
Dept: FAMILY MEDICINE CLINIC | Facility: CLINIC | Age: 63
End: 2023-06-14
Payer: COMMERCIAL

## 2023-06-14 VITALS
TEMPERATURE: 96.9 F | OXYGEN SATURATION: 97 % | BODY MASS INDEX: 25.83 KG/M2 | DIASTOLIC BLOOD PRESSURE: 82 MMHG | WEIGHT: 180.4 LBS | HEIGHT: 70 IN | SYSTOLIC BLOOD PRESSURE: 140 MMHG | HEART RATE: 102 BPM

## 2023-06-14 DIAGNOSIS — I10 PRIMARY HYPERTENSION: Primary | ICD-10-CM

## 2023-06-14 DIAGNOSIS — M54.16 LUMBAR RADICULOPATHY: ICD-10-CM

## 2023-06-14 PROBLEM — M54.31 BILATERAL SCIATICA: Status: ACTIVE | Noted: 2023-06-14

## 2023-06-14 PROBLEM — M54.32 BILATERAL SCIATICA: Status: ACTIVE | Noted: 2023-06-14

## 2023-06-14 PROCEDURE — 99214 OFFICE O/P EST MOD 30 MIN: CPT | Performed by: NURSE PRACTITIONER

## 2023-06-14 RX ORDER — HYDROCHLOROTHIAZIDE 12.5 MG/1
12.5 TABLET ORAL DAILY
Qty: 30 TABLET | Refills: 0 | Status: SHIPPED | OUTPATIENT
Start: 2023-06-14

## 2023-06-14 NOTE — PATIENT INSTRUCTIONS
How to Take a Blood Pressure Reading   AMBULATORY CARE:   Blood pressure (BP)  is the force of blood pushing on the walls of your arteries  Your BP results are written as 2 numbers  The first, or top, number is called systolic BP  This is the pressure caused by your heart pushing blood out to your body  The second, or bottom, number is called diastolic BP  This is the pressure when your heart relaxes and fills back up with blood  Ask your healthcare provider what your BP should be  For most people, a good BP goal is less than 120/80  Call your doctor if:   Your BP is higher or lower than you were told it should be  You have questions or concerns about your condition or care  Why you may need to take BP readings: You may not have any signs or symptoms of high BP  You may need to take BP readings regularly to know how often your BP is high  High BP increases your risk for a stroke, heart attack, or kidney disease  You may need to take medicine to keep your BP at a normal level  Write down and keep a log of your BP readings  Your healthcare provider can use the results to see if your BP medicines are working  How often to take BP readings: Your healthcare provider may recommend that you take BP readings 2 times a day  Take your BP at the same times each day, such as the morning and evening  How to take BP readings: You can take BP readings at home with a digital BP monitor  Read the instructions that came with your BP monitor  The monitor comes with an adjustable cuff  Ask your healthcare provider if your cuff is the correct size  Do not eat, drink, smoke, or exercise for 30 minutes before you take BP readings  Rest quietly for 5 minutes before you begin  Do not talk while you take BP readings  Sit with your feet flat on the floor and your back against a chair  Put your arm straight out and support it on a flat surface  Your arm should be at chest level   Do not move your arm while you take your BP readings  Make sure all of the air is out of the cuff  Place the BP cuff against your bare skin about 1 inch (2 5 cm) above your elbow  Wrap the cuff snugly around your arm  The BP reading may not be correct if the cuff is too loose  If you are using a wrist cuff, wrap the cuff snugly around your wrist  Hold your wrist at the same level as your heart  Turn on the BP monitor and follow the directions  Write down your BP, the date, the time, and which arm you used to take BP reading  Take 2 BP readings and write down both results  Use the same arm each time  These BP readings can be 1 minute apart  What else you need to know:   Do not take a BP reading in an arm that is injured or has an IV or shunt  The reading may not be accurate  Do not stop taking your medicines if your BP is at your goal   A BP at your goal means your medicine is working correctly  Take your BP medicines as directed  Follow up with your doctor as directed:  Bring the log of your BP readings  Also bring the BP machine  Healthcare providers can check that you are using the machine correctly  Write down your questions so you remember to ask them during your visits  © Copyright [facility] 2022 Information is for End User's use only and may not be sold, redistributed or otherwise used for commercial purposes  The above information is an  only  It is not intended as medical advice for individual conditions or treatments  Talk to your doctor, nurse or pharmacist before following any medical regimen to see if it is safe and effective for you

## 2023-06-14 NOTE — ASSESSMENT & PLAN NOTE
Pressure reading in office elevated, repeat slightly improved  Patient denies symptoms related to high blood pressure  At this time we will add hydrochlorothiazide 12 5 mg to blood pressure management  To continue on lisinopril 40 mg daily  Advised to increase physical activity as tolerated, continue with heart healthy diet  To return in 4 weeks for reevaluation

## 2023-06-14 NOTE — PROGRESS NOTES
Name: Roger Corral      : 1960      MRN: [id number]  Encounter Provider: YESENIA Reyes  Encounter Date: 2023   Encounter department: [place] [place] [address]  Primary hypertension  Assessment & Plan:  Pressure reading in office elevated, repeat slightly improved  Patient denies symptoms related to high blood pressure  At this time we will add hydrochlorothiazide 12 5 mg to blood pressure management  To continue on lisinopril 40 mg daily  Advised to increase physical activity as tolerated, continue with heart healthy diet  To return in 4 weeks for reevaluation  Orders:  -     hydrochlorothiazide (HYDRODIURIL) 12 5 mg tablet; Take 1 tablet (12 5 mg total) by mouth daily    2  Lumbar radiculopathy  Assessment & Plan:  Just completed course of physical therapy  To continue follow-up with pain management as scheduled  Subjective      Patient presents to the office for 4-week follow-up  Lisinopril was increased from 30 mg to 40 mg during last visit  As per patient, blood pressures at home have been 140's over 80's-90's  Patient notes compliance with daily blood pressure medication  Patient denies symptoms related to high blood pressure  Patient denies headaches, visual disturbances, dizziness, slurred speech, unilateral weakness, chest pain, shortness of breath, palpitations  Review of Systems   Constitutional: Negative for diaphoresis, fatigue and unexpected weight change  HENT: Negative for congestion, sore throat and trouble swallowing  Eyes: Negative for photophobia and visual disturbance  Respiratory: Negative for cough, chest tightness and shortness of breath  Cardiovascular: Negative for chest pain, palpitations and leg swelling  Gastrointestinal: Negative for abdominal pain, nausea and vomiting  Genitourinary: Negative for decreased urine volume, dysuria, frequency, hematuria and urgency  "  Musculoskeletal: Negative for arthralgias and myalgias  Skin: Negative for color change and rash  Neurological: Negative for dizziness, facial asymmetry, weakness, light-headedness, numbness and headaches  Hematological: Negative for adenopathy  Psychiatric/Behavioral: Negative for confusion  The patient is not nervous/anxious  Current Outpatient Medications on File Prior to Visit   Medication Sig   • atorvastatin (LIPITOR) 20 mg tablet Take 1 tablet (20 mg total) by mouth every evening   • Blood Glucose Monitoring Suppl (OneTouch Verio Reflect) w/Device KIT Check blood sugars once daily  Please substitute with appropriate alternative as covered by patient's insurance  Dx: E11 65   • gabapentin (NEURONTIN) 300 mg capsule Take 1 capsule (300 mg total) by mouth 3 (three) times a day   • glimepiride (AMARYL) 2 mg tablet TAKE ONE TABLET BY MOUTH EVERY DAY WITH BREAKFAST   • glucose blood (OneTouch Verio) test strip Check blood sugars once daily  Please substitute with appropriate alternative as covered by patient's insurance  Dx: E11 65   • lisinopril (ZESTRIL) 40 mg tablet Take 1 tablet (40 mg total) by mouth daily   • OneTouch Delica Lancets 03S MISC Check blood sugars once daily  Please substitute with appropriate alternative as covered by patient's insurance  Dx: E11 65   • pantoprazole (PROTONIX) 40 mg tablet Take 1 tablet (40 mg total) by mouth daily       Objective     /82 (BP Location: Left arm, Patient Position: Sitting)   Pulse 102   Temp (!) 96 9 °F (36 1 °C) (Tympanic)   Ht 5' 10\" (1 778 m)   Wt 81 8 kg (180 lb 6 4 oz)   SpO2 97%   BMI 25 88 kg/m²     Physical Exam  Vitals reviewed  Constitutional:       General: He is not in acute distress  Appearance: Normal appearance  He is not ill-appearing  HENT:      Head: Normocephalic and atraumatic        Right Ear: Tympanic membrane, ear canal and external ear normal       Left Ear: Tympanic membrane, ear canal and external ear " normal       Nose: Nose normal       Mouth/Throat:      Mouth: Mucous membranes are moist       Pharynx: Oropharynx is clear  Eyes:      Conjunctiva/sclera: Conjunctivae normal       Pupils: Pupils are equal, round, and reactive to light  Neck:      Vascular: No carotid bruit  Cardiovascular:      Rate and Rhythm: Normal rate and regular rhythm  Pulses: Normal pulses  Heart sounds: Normal heart sounds  No murmur heard  Pulmonary:      Effort: Pulmonary effort is normal       Breath sounds: Normal breath sounds  Abdominal:      General: Bowel sounds are normal       Palpations: Abdomen is soft  Tenderness: There is no abdominal tenderness  Musculoskeletal:         General: Normal range of motion  Cervical back: Normal range of motion and neck supple  Right lower leg: No edema  Left lower leg: No edema  Skin:     General: Skin is warm and dry  Capillary Refill: Capillary refill takes less than 2 seconds  Neurological:      General: No focal deficit present  Mental Status: He is alert and oriented to person, place, and time     Psychiatric:         Mood and Affect: Mood normal          Behavior: Behavior normal        YESENIA De Jesus

## 2023-06-16 DIAGNOSIS — I10 PRIMARY HYPERTENSION: ICD-10-CM

## 2023-06-16 RX ORDER — LISINOPRIL 40 MG/1
TABLET ORAL
Qty: 30 TABLET | Refills: 0 | Status: SHIPPED | OUTPATIENT
Start: 2023-06-16

## 2023-07-14 ENCOUNTER — OFFICE VISIT (OUTPATIENT)
Dept: FAMILY MEDICINE CLINIC | Facility: CLINIC | Age: 63
End: 2023-07-14
Payer: COMMERCIAL

## 2023-07-14 VITALS
HEART RATE: 77 BPM | SYSTOLIC BLOOD PRESSURE: 128 MMHG | TEMPERATURE: 96.7 F | WEIGHT: 183.2 LBS | DIASTOLIC BLOOD PRESSURE: 82 MMHG | OXYGEN SATURATION: 99 % | BODY MASS INDEX: 26.29 KG/M2

## 2023-07-14 DIAGNOSIS — I10 PRIMARY HYPERTENSION: Primary | ICD-10-CM

## 2023-07-14 DIAGNOSIS — E11.9 TYPE 2 DIABETES MELLITUS WITHOUT COMPLICATION, WITHOUT LONG-TERM CURRENT USE OF INSULIN (HCC): ICD-10-CM

## 2023-07-14 PROCEDURE — 99214 OFFICE O/P EST MOD 30 MIN: CPT | Performed by: NURSE PRACTITIONER

## 2023-07-14 RX ORDER — HYDROCHLOROTHIAZIDE 12.5 MG/1
12.5 TABLET ORAL DAILY
Qty: 90 TABLET | Refills: 1 | Status: SHIPPED | OUTPATIENT
Start: 2023-07-14

## 2023-07-14 RX ORDER — LISINOPRIL 40 MG/1
40 TABLET ORAL DAILY
Qty: 90 TABLET | Refills: 1 | Status: SHIPPED | OUTPATIENT
Start: 2023-07-14

## 2023-07-14 RX ORDER — GLIMEPIRIDE 2 MG/1
2 TABLET ORAL
Qty: 90 TABLET | Refills: 1 | Status: SHIPPED | OUTPATIENT
Start: 2023-07-14

## 2023-07-14 NOTE — PATIENT INSTRUCTIONS
Heart Healthy Diet   AMBULATORY CARE:   A heart healthy diet  is an eating plan low in unhealthy fats and sodium (salt). The plan is high in healthy fats and fiber. A heart healthy diet helps improve your cholesterol levels and lowers your risk for heart disease and stroke. A dietitian will teach you how to read and understand food labels. Heart healthy diet guidelines to follow:   Choose foods that contain healthy fats:      Unsaturated fats  include monounsaturated and polyunsaturated fats. Unsaturated fat is found in foods such as soybean, canola, olive, corn, and safflower oils. It is also found in soft tub margarine that is made with liquid vegetable oil. Omega-3 fat  is found in certain fish, such as salmon, tuna, and trout, and in walnuts and flaxseed. Eat fish high in omega-3 fats at least 2 times a week. Limit or do not have unhealthy fats:      Cholesterol  is found in animal foods, such as eggs and lobster, and in dairy products made from whole milk. Limit cholesterol to less than 200 mg each day. Saturated fat  is found in meats, such as meyer and hamburger. It is also found in chicken or turkey skin, whole milk, and butter. Limit saturated fat to less than 7% of your total daily calories. Trans fat  is found in packaged foods, such as potato chips and cookies. It is also in hard margarine, some fried foods, and shortening. Do not eat foods that contain trans fats. Get 20 to 30 grams of fiber each day. Fruits, vegetables, whole-grain foods, and legumes (cooked beans) are good sources of fiber. Limit sodium as directed. You may be told to limit sodium, such as to 2,000 mg or less each day. Choose low-sodium or no-salt-added foods. Add little or no salt to food you prepare. Use herbs and spices in place of salt.        Include the following in your heart healthy plan:  Ask your dietitian or healthcare provider how many servings to have each day from the following food groups:  Grains:      Whole-wheat breads, cereals, and pastas, and brown rice    Low-fat, low-sodium crackers and chips    Vegetables:      Broccoli, green beans, green peas, and spinach    Collards, kale, and lima beans    Carrots, sweet potatoes, tomatoes, and peppers    Canned vegetables with no salt added    Fruits:      Bananas, peaches, pears, and pineapple    Grapes, raisins, and dates    Oranges, tangerines, grapefruit, orange juice, and grapefruit juice    Apricots, mangoes, melons, and papaya    Raspberries and strawberries    Canned fruit with no added sugar    Low-fat dairy:      Nonfat (skim) milk, 1% milk, and low-fat almond, cashew, or soy milks fortified with calcium    Low-fat cheese, regular or frozen yogurt, and cottage cheese    Meats and proteins:      Lean cuts of beef and pork (loin, leg, round), skinless chicken and turkey    Legumes, soy products, egg whites, or nuts    Limit or do not include the following in your heart healthy plan:   Foods and liquids that contain unhealthy fats and oils:      Whole or 2% milk, cream cheese, sour cream, or cheese    High-fat cuts of beef (T-bone steaks, ribs), chicken or turkey with skin, and organ meats such as liver    Butter, stick margarine, shortening, and cooking oils such as coconut or palm oil    Foods and liquids high in sodium:      Packaged foods, such as frozen dinners, cookies, macaroni and cheese, and cereals with more than 300 mg of sodium per serving    Vegetables with added sodium, such as instant potatoes, vegetables with added sauces, or regular canned vegetables    Cured or smoked meats, such as hot dogs, meyer, and sausage    High-sodium ketchup, barbecue sauce, salad dressing, pickles, olives, soy sauce, or miso    Foods and liquids high in sugar:      Candy, cake, cookies, pies, or doughnuts    Soft drinks (soda), sports drinks, or sweetened tea    Canned or dry mixes for cakes, soups, sauces, or gravies    Other healthy heart guidelines:   Do not smoke. Nicotine and other chemicals in cigarettes and cigars can cause lung and heart damage. Ask your healthcare provider for information if you currently smoke and need help to quit. E-cigarettes or smokeless tobacco still contain nicotine. Talk to your provider before you use these products. Limit or do not drink alcohol as directed. Alcohol can damage your heart and raise your blood pressure. Your healthcare provider may give you specific daily and weekly limits. The general recommended limit is 1 drink a day for women 21 or older and for men 72 or older. Do not have more than 3 drinks within 24 hours or 7 within a week. The recommended limit is 2 drinks a day for men 24to 59years of age. Do not have more than 4 drinks within 24 hours or 14 within a week. A drink of alcohol is 12 ounces of beer, 5 ounces of wine, or 1½ ounces of liquor. Maintain a healthy weight. Extra body weight makes your heart work harder. Ask your provider what a healthy weight is for you. He or she can help you create a safe weight loss plan, if needed. Exercise regularly. Exercise can help you maintain a healthy weight and improve your blood pressure and cholesterol levels. Regular exercise can also decrease your risk for heart problems. Ask your provider about the best exercise plan for you. Do not start an exercise program without asking your provider. Follow up with your doctor or cardiologist as directed:  Write down your questions so you remember to ask them during your visits. © Copyright [employer] 2022 Information is for End User's use only and may not be sold, redistributed or otherwise used for commercial purposes. The above information is an  only. It is not intended as medical advice for individual conditions or treatments. Talk to your doctor, nurse or pharmacist before following any medical regimen to see if it is safe and effective for you.

## 2023-07-14 NOTE — PROGRESS NOTES
Name: Courtney Gordon      : 1960      MRN: [id number]  Encounter Provider: YESENIA Nichole  Encounter Date: 2023   Encounter department: [address] [place] [address] Road [address] [place] [address]     1. Primary hypertension  Assessment & Plan:  Blood pressure managed in office today. Courage to continue with heart healthy diet, exercise as tolerated. Medications refilled. Advised patient to obtain labs prior to wellness visit in 3 months. Orders:  -     hydrochlorothiazide (HYDRODIURIL) 12.5 mg tablet; Take 1 tablet (12.5 mg total) by mouth daily  -     lisinopril (ZESTRIL) 40 mg tablet; Take 1 tablet (40 mg total) by mouth daily    2. Type 2 diabetes mellitus without complication, without long-term current use of insulin ([address])  Assessment & Plan:  Patient denies symptoms related to elevated blood sugars. To  obtain blood work prior to wellness visit in 3 months. Lab Results   Component Value Date    HGBA1C 6.0 2023       Orders:  -     glimepiride (AMARYL) 2 mg tablet; Take 1 tablet (2 mg total) by mouth daily with breakfast         Subjective      Patient presents to the office for 4-week check-up. Had HCTZ 12.5 mg added to blood pressure management last visit. Patient notes compliance with medications as prescribed. Blood pressures at home have been 130's/80's. She denies symptoms related to high blood pressure. Patient denies headaches, dizziness, visual disturbances, chest pain, palpitations. Review of Systems   Constitutional: Negative for diaphoresis and fatigue. HENT: Negative for sore throat and trouble swallowing. Eyes: Negative for photophobia and visual disturbance. Respiratory: Negative for chest tightness and shortness of breath. Cardiovascular: Negative for chest pain, palpitations and leg swelling. Gastrointestinal: Negative for nausea and vomiting. Genitourinary: Negative for decreased urine volume.    Musculoskeletal: Negative for back pain and myalgias. Skin: Negative for color change and rash. Neurological: Negative for dizziness, weakness, light-headedness and headaches. Hematological: Negative for adenopathy. Psychiatric/Behavioral: Negative for confusion. Current Outpatient Medications on File Prior to Visit   Medication Sig   • atorvastatin (LIPITOR) 20 mg tablet Take 1 tablet (20 mg total) by mouth every evening   • Blood Glucose Monitoring Suppl (OneTouch Verio Reflect) w/Device KIT Check blood sugars once daily. Please substitute with appropriate alternative as covered by patient's insurance. Dx: E11.65   • gabapentin (NEURONTIN) 300 mg capsule Take 1 capsule (300 mg total) by mouth 3 (three) times a day   • glucose blood (OneTouch Verio) test strip Check blood sugars once daily. Please substitute with appropriate alternative as covered by patient's insurance. Dx: A90.01   • OneTouch Delica Lancets 42W MISC Check blood sugars once daily. Please substitute with appropriate alternative as covered by patient's insurance. Dx: E11.65   • pantoprazole (PROTONIX) 40 mg tablet Take 1 tablet (40 mg total) by mouth daily   • [DISCONTINUED] glimepiride (AMARYL) 2 mg tablet TAKE ONE TABLET BY MOUTH EVERY DAY WITH BREAKFAST   • [DISCONTINUED] hydrochlorothiazide (HYDRODIURIL) 12.5 mg tablet Take 1 tablet (12.5 mg total) by mouth daily   • [DISCONTINUED] lisinopril (ZESTRIL) 40 mg tablet TAKE ONE TABLET BY MOUTH EVERY DAY       Objective     /82 (BP Location: Left arm, Patient Position: Sitting)   Pulse 77   Temp (!) 96.7 °F (35.9 °C) (Tympanic)   Wt 83.1 kg (183 lb 3.2 oz)   SpO2 99%   BMI 26.29 kg/m²     Physical Exam  Vitals reviewed. Constitutional:       General: He is not in acute distress. Appearance: Normal appearance. He is not ill-appearing. HENT:      Head: Normocephalic and atraumatic.       Right Ear: Tympanic membrane, ear canal and external ear normal.      Left Ear: Tympanic membrane, ear canal and external ear normal.      Nose: Nose normal.      Mouth/Throat:      Mouth: Mucous membranes are moist.      Pharynx: Oropharynx is clear. Eyes:      Conjunctiva/sclera: Conjunctivae normal.      Pupils: Pupils are equal, round, and reactive to light. Neck:      Vascular: No carotid bruit. Cardiovascular:      Rate and Rhythm: Normal rate and regular rhythm. Pulses: Normal pulses. Heart sounds: Normal heart sounds. No murmur heard. Pulmonary:      Effort: Pulmonary effort is normal.      Breath sounds: Normal breath sounds. Musculoskeletal:         General: Normal range of motion. Cervical back: Normal range of motion and neck supple. Right lower leg: No edema. Left lower leg: No edema. Skin:     General: Skin is warm and dry. Capillary Refill: Capillary refill takes less than 2 seconds. Neurological:      General: No focal deficit present. Mental Status: He is alert and oriented to person, place, and time.    Psychiatric:         Mood and Affect: Mood normal.         Behavior: Behavior normal.       YESENIA Deutsch

## 2023-07-14 NOTE — ASSESSMENT & PLAN NOTE
Patient denies symptoms related to elevated blood sugars. To  obtain blood work prior to wellness visit in 3 months.     Lab Results   Component Value Date    HGBA1C 6.0 05/16/2023

## 2023-07-14 NOTE — ASSESSMENT & PLAN NOTE
Blood pressure managed in office today. Courage to continue with heart healthy diet, exercise as tolerated. Medications refilled. Advised patient to obtain labs prior to wellness visit in 3 months.

## 2023-08-04 ENCOUNTER — VBI (OUTPATIENT)
Dept: ADMINISTRATIVE | Facility: OTHER | Age: 63
End: 2023-08-04

## 2023-10-18 ENCOUNTER — VBI (OUTPATIENT)
Dept: ADMINISTRATIVE | Facility: OTHER | Age: 63
End: 2023-10-18

## 2023-10-18 NOTE — TELEPHONE ENCOUNTER
10/18/23 9:50 AM     [employer] SmartForm used to document caregap status.     [facility] [place]

## 2023-11-08 ENCOUNTER — TELEPHONE (OUTPATIENT)
Dept: FAMILY MEDICINE CLINIC | Facility: CLINIC | Age: 63
End: 2023-11-08

## 2023-11-10 ENCOUNTER — OFFICE VISIT (OUTPATIENT)
Dept: FAMILY MEDICINE CLINIC | Facility: CLINIC | Age: 63
End: 2023-11-10
Payer: COMMERCIAL

## 2023-11-10 VITALS
BODY MASS INDEX: 26.88 KG/M2 | WEIGHT: 187.8 LBS | DIASTOLIC BLOOD PRESSURE: 88 MMHG | HEIGHT: 70 IN | HEART RATE: 90 BPM | OXYGEN SATURATION: 98 % | SYSTOLIC BLOOD PRESSURE: 140 MMHG | TEMPERATURE: 96.4 F

## 2023-11-10 DIAGNOSIS — E11.9 TYPE 2 DIABETES MELLITUS WITHOUT COMPLICATION, WITHOUT LONG-TERM CURRENT USE OF INSULIN (HCC): ICD-10-CM

## 2023-11-10 DIAGNOSIS — E78.2 MIXED HYPERLIPIDEMIA: ICD-10-CM

## 2023-11-10 DIAGNOSIS — J01.00 ACUTE NON-RECURRENT MAXILLARY SINUSITIS: Primary | ICD-10-CM

## 2023-11-10 DIAGNOSIS — I10 PRIMARY HYPERTENSION: ICD-10-CM

## 2023-11-10 PROCEDURE — 99214 OFFICE O/P EST MOD 30 MIN: CPT | Performed by: NURSE PRACTITIONER

## 2023-11-10 RX ORDER — FLUTICASONE PROPIONATE 50 MCG
1 SPRAY, SUSPENSION (ML) NASAL DAILY
Qty: 18.2 ML | Refills: 1 | Status: SHIPPED | OUTPATIENT
Start: 2023-11-10

## 2023-11-10 RX ORDER — AMOXICILLIN AND CLAVULANATE POTASSIUM 875; 125 MG/1; MG/1
1 TABLET, FILM COATED ORAL EVERY 12 HOURS SCHEDULED
Qty: 20 TABLET | Refills: 0 | Status: SHIPPED | OUTPATIENT
Start: 2023-11-10 | End: 2023-11-20

## 2023-11-10 NOTE — ASSESSMENT & PLAN NOTE
Patient denies symptoms related to elevated blood sugars. Advised is overdue for blood work and yearly physical.  To obtain blood work after treatment for sinusitis.   To return for physical, eye exam, foot exam.  Lab Results   Component Value Date    HGBA1C 6.0 05/16/2023

## 2023-11-10 NOTE — PATIENT INSTRUCTIONS
Sinusitis   AMBULATORY CARE:   Sinusitis  is inflammation or infection of your sinuses. Sinusitis is most often caused by a virus. Acute sinusitis may last up to 12 weeks. Chronic sinusitis lasts longer than 12 weeks. Recurrent sinusitis means you have 4 or more infections in 1 year. Common signs and symptoms:   Fever    Pain, pressure, redness, or swelling around the forehead, cheeks, or eyes    Thick yellow or green discharge from your nose    Tenderness when you touch your face over your sinuses    Dry cough that happens mostly at night or when you lie down    Headache and face pain that is worse when you lean forward    Tooth pain, or pain when you chew    Seek care immediately if:   You have trouble breathing or wheezing that is getting worse. You have a stiff neck, a fever, or a bad headache. You cannot open your eye. Your eyeball bulges out or you cannot move your eye. You are more sleepy than normal, or you notice changes in your ability to think, move, or talk. You have swelling of your forehead or scalp. Call your doctor if:   You have vision changes, such as double vision. Your eye and eyelid are red, swollen, and painful. Your symptoms do not improve or go away after 10 days. You have nausea and are vomiting. Your nose is bleeding. You have questions or concerns about your condition or care. Medicines: Your symptoms may go away on their own. Your healthcare provider may recommend watchful waiting for up to 10 days before starting antibiotics. You may need any of the following:  Acetaminophen  decreases pain and fever. It is available without a doctor's order. Ask how much to take and how often to take it. Follow directions. Read the labels of all other medicines you are using to see if they also contain acetaminophen, or ask your doctor or pharmacist. Acetaminophen can cause liver damage if not taken correctly.     NSAIDs , such as ibuprofen, help decrease swelling, pain, and fever. This medicine is available with or without a doctor's order. NSAIDs can cause stomach bleeding or kidney problems in certain people. If you take blood thinner medicine, always ask your healthcare provider if NSAIDs are safe for you. Always read the medicine label and follow directions. Nasal steroid sprays  may help decrease inflammation in your nose and sinuses. Decongestants  help reduce swelling and drain mucus in the nose and sinuses. They may help you breathe easier. Antihistamines  help dry mucus in the nose and relieve sneezing. Antibiotics  help treat or prevent a bacterial infection. Self-care:   Rinse your sinuses as directed. Use a sinus rinse device to rinse your nasal passages with a saline (salt water) solution or distilled water. Do not use tap water. This will help thin the mucus in your nose and rinse away pollen and dirt. It will also help reduce swelling so you can breathe normally. Use a humidifier  to increase air moisture in your home. This may make it easier for you to breathe and help decrease your cough. Sleep with your head elevated. Place an extra pillow under your head before you go to sleep to help your sinuses drain. Drink liquids as directed. Ask your healthcare provider how much liquid to drink each day and which liquids are best for you. Liquids will thin the mucus in your nose and help it drain. Avoid drinks that contain alcohol or caffeine. Do not smoke, and avoid secondhand smoke. Nicotine and other chemicals in cigarettes and cigars can make your symptoms worse. Ask your healthcare provider for information if you currently smoke and need help to quit. E-cigarettes or smokeless tobacco still contain nicotine. Talk to your healthcare provider before you use these products. Prevent the spread of germs:   Wash your hands often with soap and water.   Wash your hands after you use the bathroom, change a child's diaper, or sneeze. Wash your hands before you prepare or eat food. Stay away from people who are sick. Some germs spread easily and quickly through contact. Follow up with your doctor as directed: You may be referred to an ear, nose, and throat specialist. Write down your questions so you remember to ask them during your visits. © Copyright [facility] 2023 Information is for End User's use only and may not be sold, redistributed or otherwise used for commercial purposes. The above information is an  only. It is not intended as medical advice for individual conditions or treatments. Talk to your doctor, nurse or pharmacist before following any medical regimen to see if it is safe and effective for you.

## 2023-11-10 NOTE — PROGRESS NOTES
Name: Yanni Diaz      : 1960      MRN: [id number]  Encounter Provider: YESENIA Boswell  Encounter Date: 11/10/2023   Encounter department: [address] [place] [address]     1. Acute non-recurrent maxillary sinusitis  Comments:  Advised increase hydration, moist compresses to face, steam, avoidance of triggers, saline rinses twice daily, humidified air, medications as prescribed  Orders:  -     amoxicillin-clavulanate (AUGMENTIN) 875-125 mg per tablet; Take 1 tablet by mouth every 12 (twelve) hours for 10 days  -     fluticasone (FLONASE) 50 mcg/act nasal spray; 1 spray into each nostril daily    2. Primary hypertension  Assessment & Plan:  Diastolic blood pressure slightly elevated during visit. Patient states he has been taking over-the-counter medications for symptoms, denies high readings at home. Advised to avoid products with DM and them. To obtain blood work as ordered, continue on current medications for blood pressure. Orders:  -     Hemoglobin A1C; Future; Expected date: 11/10/2023  -     Comprehensive metabolic panel; Future; Expected date: 11/10/2023  -     CBC and differential; Future; Expected date: 11/10/2023  -     Lipid Panel with Direct LDL reflex; Future; Expected date: 11/10/2023  -     Albumin / creatinine urine ratio; Future; Expected date: 11/10/2023  -     TSH, 3rd generation with Free T4 reflex; Future; Expected date: 11/10/2023    3. Type 2 diabetes mellitus without complication, without long-term current use of insulin ([address])  Assessment & Plan:  Patient denies symptoms related to elevated blood sugars. Advised is overdue for blood work and yearly physical.  To obtain blood work after treatment for sinusitis.   To return for physical, eye exam, foot exam.  Lab Results   Component Value Date    HGBA1C 6.0 2023       Orders:  -     Hemoglobin A1C; Future; Expected date: 11/10/2023  -     Comprehensive metabolic panel; Future; Expected date: 11/10/2023  -     CBC and differential; Future; Expected date: 11/10/2023  -     Lipid Panel with Direct LDL reflex; Future; Expected date: 11/10/2023  -     Albumin / creatinine urine ratio; Future; Expected date: 11/10/2023  -     TSH, 3rd generation with Free T4 reflex; Future; Expected date: 11/10/2023    4. Mixed hyperlipidemia  -     Lipid Panel with Direct LDL reflex; Future; Expected date: 11/10/2023           Subjective      Patient presents to the office for evaluation of sinus pain/pressure. 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 + dust exposure. Congestion, post-nasal drip, sinus pressure. Has been using DayQuil and NyQuil. Negative Covid test at home. Sinusitis  This is a new problem. The current episode started 1 to 4 weeks ago. The problem is unchanged. There has been no fever. The pain is mild. Associated symptoms include congestion, coughing and sinus pressure. Pertinent negatives include no chills, diaphoresis, ear pain, headaches, hoarse voice, neck pain, shortness of breath, sneezing, sore throat or swollen glands. The treatment provided mild relief. Review of Systems   Constitutional:  Negative for chills and diaphoresis. HENT:  Positive for congestion, postnasal drip and sinus pressure. Negative for ear pain, hoarse voice, sinus pain, sneezing and sore throat. Eyes:  Negative for photophobia and visual disturbance. Respiratory:  Positive for cough. Negative for shortness of breath. Cardiovascular:  Negative for chest pain and palpitations. Gastrointestinal:  Negative for abdominal pain, nausea and vomiting. Genitourinary:  Negative for decreased urine volume. Musculoskeletal:  Negative for neck pain. Skin:  Negative for color change and rash. Allergic/Immunologic: Negative for environmental allergies. Neurological:  Negative for light-headedness and headaches.    Psychiatric/Behavioral:  Negative for confusion. Current Outpatient Medications on File Prior to Visit   Medication Sig    Blood Glucose Monitoring Suppl (OneTouch Verio Reflect) w/Device KIT Check blood sugars once daily. Please substitute with appropriate alternative as covered by patient's insurance. Dx: E11.65    gabapentin (NEURONTIN) 300 mg capsule Take 1 capsule (300 mg total) by mouth 3 (three) times a day    glimepiride (AMARYL) 2 mg tablet Take 1 tablet (2 mg total) by mouth daily with breakfast    glucose blood (OneTouch Verio) test strip Check blood sugars once daily. Please substitute with appropriate alternative as covered by patient's insurance. Dx: E11.65    hydrochlorothiazide (HYDRODIURIL) 12.5 mg tablet Take 1 tablet (12.5 mg total) by mouth daily    lisinopril (ZESTRIL) 40 mg tablet Take 1 tablet (40 mg total) by mouth daily    OneTouch Delica Lancets 94P MISC Check blood sugars once daily. Please substitute with appropriate alternative as covered by patient's insurance. Dx: E11.65    pantoprazole (PROTONIX) 40 mg tablet Take 1 tablet (40 mg total) by mouth daily    atorvastatin (LIPITOR) 20 mg tablet Take 1 tablet (20 mg total) by mouth every evening       Objective     /88 (BP Location: Left arm, Patient Position: Sitting, Cuff Size: Standard)   Pulse 90   Temp (!) 96.4 °F (35.8 °C) (Tympanic)   Ht 5' 10" (1.778 m)   Wt 85.2 kg (187 lb 12.8 oz)   SpO2 98%   BMI 26.95 kg/m²     Physical Exam  Vitals reviewed. Constitutional:       General: He is not in acute distress. Appearance: Normal appearance. He is not ill-appearing. HENT:      Head: Normocephalic and atraumatic. Right Ear: Tympanic membrane, ear canal and external ear normal.      Left Ear: Tympanic membrane, ear canal and external ear normal.      Nose: Congestion present. Right Turbinates: Enlarged. Left Turbinates: Not enlarged. Right Sinus: Maxillary sinus tenderness present. No frontal sinus tenderness.       Left Sinus: Maxillary sinus tenderness present. No frontal sinus tenderness. Eyes:      Conjunctiva/sclera: Conjunctivae normal.      Pupils: Pupils are equal, round, and reactive to light. Cardiovascular:      Rate and Rhythm: Normal rate and regular rhythm. Pulses: Normal pulses. Heart sounds: Normal heart sounds. No murmur heard. Pulmonary:      Effort: Pulmonary effort is normal.      Breath sounds: Normal breath sounds. No wheezing. Abdominal:      General: Bowel sounds are normal.      Palpations: Abdomen is soft. Musculoskeletal:         General: Normal range of motion. Cervical back: Normal range of motion and neck supple. Lymphadenopathy:      Cervical: No cervical adenopathy. Skin:     General: Skin is warm and dry. Neurological:      General: No focal deficit present. Mental Status: He is alert and oriented to person, place, and time.    Psychiatric:         Mood and Affect: Mood normal.         Behavior: Behavior normal.       YESENIA Haque

## 2023-11-10 NOTE — ASSESSMENT & PLAN NOTE
Diastolic blood pressure slightly elevated during visit. Patient states he has been taking over-the-counter medications for symptoms, denies high readings at home. Advised to avoid products with DM and them. To obtain blood work as ordered, continue on current medications for blood pressure.

## 2023-12-02 ENCOUNTER — APPOINTMENT (OUTPATIENT)
Dept: LAB | Facility: HOSPITAL | Age: 63
End: 2023-12-02
Payer: COMMERCIAL

## 2023-12-02 DIAGNOSIS — E78.2 MIXED HYPERLIPIDEMIA: ICD-10-CM

## 2023-12-02 DIAGNOSIS — I10 PRIMARY HYPERTENSION: ICD-10-CM

## 2023-12-02 DIAGNOSIS — E11.9 TYPE 2 DIABETES MELLITUS WITHOUT COMPLICATION, WITHOUT LONG-TERM CURRENT USE OF INSULIN (HCC): ICD-10-CM

## 2023-12-02 LAB
ALBUMIN SERPL BCP-MCNC: 4.2 G/DL (ref 3.5–5)
ALP SERPL-CCNC: 62 U/L (ref 34–104)
ALT SERPL W P-5'-P-CCNC: 29 U/L (ref 7–52)
ANION GAP SERPL CALCULATED.3IONS-SCNC: 4 MMOL/L
AST SERPL W P-5'-P-CCNC: 21 U/L (ref 13–39)
BASOPHILS # BLD AUTO: 0.07 THOUSANDS/ÂΜL (ref 0–0.1)
BASOPHILS NFR BLD AUTO: 1 % (ref 0–1)
BILIRUB SERPL-MCNC: 0.5 MG/DL (ref 0.2–1)
BUN SERPL-MCNC: 20 MG/DL (ref 5–25)
CALCIUM SERPL-MCNC: 9.3 MG/DL (ref 8.4–10.2)
CHLORIDE SERPL-SCNC: 106 MMOL/L (ref 96–108)
CHOLEST SERPL-MCNC: 194 MG/DL
CO2 SERPL-SCNC: 27 MMOL/L (ref 21–32)
CREAT SERPL-MCNC: 1.17 MG/DL (ref 0.6–1.3)
EOSINOPHIL # BLD AUTO: 0.3 THOUSAND/ÂΜL (ref 0–0.61)
EOSINOPHIL NFR BLD AUTO: 5 % (ref 0–6)
ERYTHROCYTE [DISTWIDTH] IN BLOOD BY AUTOMATED COUNT: 12.9 % (ref 11.6–15.1)
GFR SERPL CREATININE-BSD FRML MDRD: 65 ML/MIN/1.73SQ M
GLUCOSE P FAST SERPL-MCNC: 200 MG/DL (ref 65–99)
HCT VFR BLD AUTO: 42.7 % (ref 36.5–49.3)
HDLC SERPL-MCNC: 63 MG/DL
HGB BLD-MCNC: 14.1 G/DL (ref 12–17)
IMM GRANULOCYTES # BLD AUTO: 0.03 THOUSAND/UL (ref 0–0.2)
IMM GRANULOCYTES NFR BLD AUTO: 1 % (ref 0–2)
LDLC SERPL CALC-MCNC: 124 MG/DL (ref 0–100)
LYMPHOCYTES # BLD AUTO: 1.64 THOUSANDS/ÂΜL (ref 0.6–4.47)
LYMPHOCYTES NFR BLD AUTO: 26 % (ref 14–44)
MCH RBC QN AUTO: 29.8 PG (ref 26.8–34.3)
MCHC RBC AUTO-ENTMCNC: 33 G/DL (ref 31.4–37.4)
MCV RBC AUTO: 90 FL (ref 82–98)
MONOCYTES # BLD AUTO: 0.37 THOUSAND/ÂΜL (ref 0.17–1.22)
MONOCYTES NFR BLD AUTO: 6 % (ref 4–12)
NEUTROPHILS # BLD AUTO: 4.02 THOUSANDS/ÂΜL (ref 1.85–7.62)
NEUTS SEG NFR BLD AUTO: 61 % (ref 43–75)
NRBC BLD AUTO-RTO: 0 /100 WBCS
PLATELET # BLD AUTO: 170 THOUSANDS/UL (ref 149–390)
PMV BLD AUTO: 11.8 FL (ref 8.9–12.7)
POTASSIUM SERPL-SCNC: 4.3 MMOL/L (ref 3.5–5.3)
PROT SERPL-MCNC: 6.8 G/DL (ref 6.4–8.4)
RBC # BLD AUTO: 4.73 MILLION/UL (ref 3.88–5.62)
SODIUM SERPL-SCNC: 137 MMOL/L (ref 135–147)
TRIGL SERPL-MCNC: 37 MG/DL
TSH SERPL DL<=0.05 MIU/L-ACNC: 1.55 UIU/ML (ref 0.45–4.5)
WBC # BLD AUTO: 6.43 THOUSAND/UL (ref 4.31–10.16)

## 2023-12-02 PROCEDURE — 36415 COLL VENOUS BLD VENIPUNCTURE: CPT

## 2023-12-02 PROCEDURE — 84443 ASSAY THYROID STIM HORMONE: CPT

## 2023-12-02 PROCEDURE — 85025 COMPLETE CBC W/AUTO DIFF WBC: CPT

## 2023-12-02 PROCEDURE — 80061 LIPID PANEL: CPT

## 2023-12-02 PROCEDURE — 82570 ASSAY OF URINE CREATININE: CPT

## 2023-12-02 PROCEDURE — 82043 UR ALBUMIN QUANTITATIVE: CPT

## 2023-12-02 PROCEDURE — 80053 COMPREHEN METABOLIC PANEL: CPT

## 2023-12-02 PROCEDURE — 83036 HEMOGLOBIN GLYCOSYLATED A1C: CPT

## 2023-12-03 LAB
CREAT UR-MCNC: 189.3 MG/DL
EST. AVERAGE GLUCOSE BLD GHB EST-MCNC: 148 MG/DL
HBA1C MFR BLD: 6.8 %
MICROALBUMIN UR-MCNC: 13.5 MG/L
MICROALBUMIN/CREAT 24H UR: 7 MG/G CREATININE (ref 0–30)

## 2023-12-07 NOTE — PROGRESS NOTES
Pain Medicine Follow-Up Note    Assessment:  1. Peroneal neuropathy, left    2. Lumbar radiculopathy    3. Lumbar facet arthropathy    4. Left groin pain    5. Left hip pain        Plan:  Orders Placed This Encounter   Procedures    XR hip/pelv 2-3 vws left if performed     Standing Status:   Future     Standing Expiration Date:   12/8/2027     Scheduling Instructions:      Bring along any outside films relating to this procedure. Ambulatory Referral to Sports Medicine     Standing Status:   Future     Standing Expiration Date:   12/8/2024     Referral Priority:   Routine     Referral Type:   Consult - AMB     Referral Reason:   Specialty Services Required     Referred to Provider:   Jesus Huang DO     Requested Specialty:   Sports Medicine     Number of Visits Requested:   1     Expiration Date:   12/8/2024       New Medications Ordered This Visit   Medications    gabapentin (NEURONTIN) 300 mg capsule     Sig: Take 1 capsule (300 mg total) by mouth 3 (three) times a day     Dispense:  270 capsule     Refill:  1       My impressions and treatment recommendations were discussed in detail with the patient who verbalized understanding and had no further questions. This is a 59-year-old male who returns her office with ongoing back and left hip pain. His symptoms are well-controlled with gabapentin 300 milligrams 3 times daily with continued permanent pain function without any significant side effects therefore we will continue medication as is without any changes. He will return in 6 months or sooner medically necessary. For complaints of ongoing left groin pain with squatting/crouching, will order x-rays of the left hip and place referral to Sports medicine for further evaluation. Hip provocation maneuvers are unremarkable. Likely not degenerative    Back is still ok. He is able to tolerate this. Also reports some numbness in the left lower leg and improved strength. [place] Prescription Drug Monitoring Program report was reviewed and was appropriate       Complete risks and benefits including bleeding, infection, tissue reaction, nerve injury and allergic reaction were discussed. The approach was demonstrated using models and literature was provided. Verbal and written consent was obtained. Discharge instructions were provided. I personally saw and examined the patient and I agree with the above discussed plan of care. History of Present Illness:    Maryanne Cobos is a 61 y.o. male who presents to [facility] for interval re-evaluation of the above stated pain complaints. The patient has a past medical and chronic pain history as outlined in the assessment section. He was last seen on 6/7/2023 at which time he was continued on gabapentin 300 mg 3 times daily which continues to provide 75% relief without any significant side effects. Pain is 1 out of 10 today. Intermittent, dull/aching in nature. He reports pain in the left groin that is notable after crouching for some time. Denies pain with going up/down steps or getting in an out of a car. Other than as stated above, the patient denies any interval changes in medications, medical condition, mental condition, symptoms, or allergies since the last office visit.          Review of Systems:    Review of Systems   Musculoskeletal:         HIP PAIN LEFT           Past Medical History:   Diagnosis Date    Colon polyp     Diabetes mellitus ([address])     Hyperlipidemia     Sciatica     Type 2 diabetes mellitus ([address])        Past Surgical History:   Procedure Laterality Date    COLONOSCOPY         Family History   Problem Relation Age of Onset    Diabetes Mother     Alzheimer's disease Mother     Dementia Father     Prostate cancer Father     Hypertension Father     Hyperlipidemia Father        Social History     Occupational History    Not on file   Tobacco Use    Smoking status: Some Days Types: Cigars    Smokeless tobacco: Never    Tobacco comments:     Occasionally   Vaping Use    Vaping Use: Never used   Substance and Sexual Activity    Alcohol use: Yes     Comment: few times a week    Drug use: Not Currently    Sexual activity: Not on file         Current Outpatient Medications:     Blood Glucose Monitoring Suppl (OneTouch Verio Reflect) w/Device KIT, Check blood sugars once daily. Please substitute with appropriate alternative as covered by patient's insurance. Dx: E11.65, Disp: 1 kit, Rfl: 0    gabapentin (NEURONTIN) 300 mg capsule, Take 1 capsule (300 mg total) by mouth 3 (three) times a day, Disp: 270 capsule, Rfl: 1    glimepiride (AMARYL) 2 mg tablet, Take 1 tablet (2 mg total) by mouth daily with breakfast, Disp: 90 tablet, Rfl: 1    glucose blood (OneTouch Verio) test strip, Check blood sugars once daily. Please substitute with appropriate alternative as covered by patient's insurance. Dx: E11.65, Disp: 100 each, Rfl: 3    hydrochlorothiazide (HYDRODIURIL) 12.5 mg tablet, Take 1 tablet (12.5 mg total) by mouth daily, Disp: 90 tablet, Rfl: 1    lisinopril (ZESTRIL) 40 mg tablet, Take 1 tablet (40 mg total) by mouth daily, Disp: 90 tablet, Rfl: 1    OneTouch Delica Lancets 65N MISC, Check blood sugars once daily. Please substitute with appropriate alternative as covered by patient's insurance.  Dx: E11.65, Disp: 100 each, Rfl: 3    atorvastatin (LIPITOR) 20 mg tablet, Take 1 tablet (20 mg total) by mouth every evening, Disp: 90 tablet, Rfl: 1    fluticasone (FLONASE) 50 mcg/act nasal spray, 1 spray into each nostril daily (Patient not taking: Reported on 12/8/2023), Disp: 18.2 mL, Rfl: 1    pantoprazole (PROTONIX) 40 mg tablet, Take 1 tablet (40 mg total) by mouth daily (Patient not taking: Reported on 12/8/2023), Disp: 30 tablet, Rfl: 2    No Known Allergies    Physical Exam:    /93   Pulse 81   Ht 5' 10" (1.778 m)   Wt 85.3 kg (188 lb)   BMI 26.98 kg/m²     Constitutional:normal, well developed, well nourished, alert, in no distress and non-toxic and no overt pain behavior. Eyes:anicteric  HEENT:grossly intact  Neck:supple, symmetric, trachea midline and no masses   Pulmonary:even and unlabored  Cardiovascular:No edema or pitting edema present  Skin:Normal without rashes or lesions and well hydrated  Psychiatric:Mood and affect appropriate  Neurologic:Cranial Nerves II-XII grossly intact  Musculoskeletal: ST[place]CHFIELD TEST, LOG ROLLING, JUAN C NEGATIVE ON THE LEFT.       Imaging  XR hip/pelv 2-3 vws left if performed    (Results Pending)         Orders Placed This Encounter   Procedures    XR hip/pelv 2-3 vws left if performed    Ambulatory Referral to Sports Medicine

## 2023-12-08 ENCOUNTER — OFFICE VISIT (OUTPATIENT)
Dept: PAIN MEDICINE | Facility: CLINIC | Age: 63
End: 2023-12-08
Payer: COMMERCIAL

## 2023-12-08 ENCOUNTER — APPOINTMENT (OUTPATIENT)
Dept: RADIOLOGY | Facility: CLINIC | Age: 63
End: 2023-12-08
Payer: COMMERCIAL

## 2023-12-08 VITALS
BODY MASS INDEX: 26.92 KG/M2 | HEIGHT: 70 IN | WEIGHT: 188 LBS | HEART RATE: 81 BPM | SYSTOLIC BLOOD PRESSURE: 164 MMHG | DIASTOLIC BLOOD PRESSURE: 93 MMHG

## 2023-12-08 DIAGNOSIS — R10.32 LEFT GROIN PAIN: ICD-10-CM

## 2023-12-08 DIAGNOSIS — G57.32 PERONEAL NEUROPATHY, LEFT: Primary | ICD-10-CM

## 2023-12-08 DIAGNOSIS — M25.552 LEFT HIP PAIN: ICD-10-CM

## 2023-12-08 DIAGNOSIS — M47.816 LUMBAR FACET ARTHROPATHY: ICD-10-CM

## 2023-12-08 DIAGNOSIS — M54.16 LUMBAR RADICULOPATHY: ICD-10-CM

## 2023-12-08 PROCEDURE — 99214 OFFICE O/P EST MOD 30 MIN: CPT | Performed by: STUDENT IN AN ORGANIZED HEALTH CARE EDUCATION/TRAINING PROGRAM

## 2023-12-08 PROCEDURE — 73502 X-RAY EXAM HIP UNI 2-3 VIEWS: CPT

## 2023-12-08 RX ORDER — GABAPENTIN 300 MG/1
300 CAPSULE ORAL 3 TIMES DAILY
Qty: 270 CAPSULE | Refills: 1 | Status: SHIPPED | OUTPATIENT
Start: 2023-12-08

## 2023-12-12 ENCOUNTER — TELEPHONE (OUTPATIENT)
Dept: RADIOLOGY | Facility: CLINIC | Age: 63
End: 2023-12-12

## 2023-12-12 ENCOUNTER — OFFICE VISIT (OUTPATIENT)
Dept: FAMILY MEDICINE CLINIC | Facility: CLINIC | Age: 63
End: 2023-12-12

## 2023-12-12 VITALS
SYSTOLIC BLOOD PRESSURE: 124 MMHG | HEART RATE: 109 BPM | DIASTOLIC BLOOD PRESSURE: 82 MMHG | OXYGEN SATURATION: 99 % | TEMPERATURE: 97.4 F | BODY MASS INDEX: 26.92 KG/M2 | HEIGHT: 70 IN | WEIGHT: 188 LBS

## 2023-12-12 DIAGNOSIS — Z00.00 ANNUAL PHYSICAL EXAM: Primary | ICD-10-CM

## 2023-12-12 DIAGNOSIS — E78.2 MIXED HYPERLIPIDEMIA: ICD-10-CM

## 2023-12-12 DIAGNOSIS — E11.9 TYPE 2 DIABETES MELLITUS WITHOUT COMPLICATION, WITHOUT LONG-TERM CURRENT USE OF INSULIN (HCC): ICD-10-CM

## 2023-12-12 RX ORDER — ATORVASTATIN CALCIUM 20 MG/1
20 TABLET, FILM COATED ORAL EVERY EVENING
Qty: 90 TABLET | Refills: 1 | Status: SHIPPED | OUTPATIENT
Start: 2023-12-12 | End: 2024-06-09

## 2023-12-12 NOTE — PATIENT INSTRUCTIONS
Wellness Visit for Adults   AMBULATORY CARE:   A wellness visit  is when you see your healthcare provider to get screened for health problems. Your healthcare provider will also give you advice on how to stay healthy. Write down your questions so you remember to ask them. Ask your healthcare provider how often you should have a wellness visit. What happens at a wellness visit:  Your healthcare provider will ask about your health, and your family history of health problems. This includes high blood pressure, heart disease, and cancer. He or she will ask if you have symptoms that concern you, if you smoke, and about your mood. You may also be asked about your intake of medicines, supplements, food, and alcohol. Any of the following may be done: Your weight  will be checked. Your height may also be checked so your body mass index (BMI) can be calculated. Your BMI shows if you are at a healthy weight. Your blood pressure  and heart rate will be checked. Your temperature may also be checked. Blood and urine tests  may be done. Blood tests may be done to check your cholesterol levels. Abnormal cholesterol levels increase your risk for heart disease and stroke. You may also need a blood or urine test to check for diabetes if you are at increased risk. Urine tests may be done to look for signs of an infection or kidney disease. A physical exam  includes checking your heartbeat and lungs with a stethoscope. Your healthcare provider may also check your skin to look for sun damage. Screening tests  may be recommended. A screening test is done to check for diseases that may not cause symptoms. The screening tests you may need depend on your age, gender, family history, and lifestyle habits. For example, colorectal screening may be recommended if you are 48years old or older. Screening tests you need if you are a woman:   A Pap smear  is used to screen for cervical cancer.  Pap smears are usually done every 3 to 5 years depending on your age. You may need them more often if you have had abnormal Pap smear test results in the past. Ask your healthcare provider how often you should have a Pap smear. A mammogram  is an x-ray of your breasts to screen for breast cancer. Experts recommend mammograms every 2 years starting at age 48 years. You may need a mammogram at age 52 years or younger if you have an increased risk for breast cancer. Talk to your healthcare provider about when you should start having mammograms and how often you need them. Vaccines you may need:   Get an influenza vaccine  every year. The influenza vaccine protects you from the flu. Several types of viruses cause the flu. The viruses change over time, so new vaccines are made each year. Get a tetanus-diphtheria (Td) booster vaccine  every 10 years. This vaccine protects you against tetanus and diphtheria. Tetanus is a severe infection that may cause painful muscle spasms and lockjaw. Diphtheria is a severe bacterial infection that causes a thick covering in the back of your mouth and throat. Get a human papillomavirus (HPV) vaccine  if you are female and aged 23 to 32 or male 23 to 24 and never received it. This vaccine protects you from HPV infection. HPV is the most common infection spread by sexual contact. HPV may also cause vaginal, penile, and anal cancers. Get a pneumococcal vaccine  if you are aged 72 years or older. The pneumococcal vaccine is an injection given to protect you from pneumococcal disease. Pneumococcal disease is an infection caused by pneumococcal bacteria. The infection may cause pneumonia, meningitis, or an ear infection. Get a shingles vaccine  if you are 60 or older, even if you have had shingles before. The shingles vaccine is an injection to protect you from the varicella-zoster virus. This is the same virus that causes chickenpox.  Shingles is a painful rash that develops in people who had chickenpox or have been exposed to the virus. How to eat healthy:  My Plate is a model for planning healthy meals. It shows the types and amounts of foods that should go on your plate. Fruits and vegetables make up about half of your plate, and grains and protein make up the other half. A serving of dairy is included on the side of your plate. The amount of calories and serving sizes you need depends on your age, gender, weight, and height. Examples of healthy foods are listed below:  Eat a variety of vegetables  such as dark green, red, and orange vegetables. You can also include canned vegetables low in sodium (salt) and frozen vegetables without added butter or sauces. Eat a variety of fresh fruits , canned fruit in 100% juice, frozen fruit, and dried fruit. Include whole grains. At least half of the grains you eat should be whole grains. Examples include whole-wheat bread, wheat pasta, brown rice, and whole-grain cereals such as oatmeal.    Eat a variety of protein foods such as seafood (fish and shellfish), lean meat, and poultry without skin (turkey and chicken). Examples of lean meats include pork leg, shoulder, or tenderloin, and beef round, sirloin, tenderloin, and extra lean ground beef. Other protein foods include eggs and egg substitutes, beans, peas, soy products, nuts, and seeds. Choose low-fat dairy products such as skim or 1% milk or low-fat yogurt, cheese, and cottage cheese. Limit unhealthy fats  such as butter, hard margarine, and shortening. Exercise:  Exercise at least 30 minutes per day on most days of the week. Some examples of exercise include walking, biking, dancing, and swimming. You can also fit in more physical activity by taking the stairs instead of the elevator or parking farther away from stores. Include muscle strengthening activities 2 days each week. Regular exercise provides many health benefits.  It helps you manage your weight, and decreases your risk for type 2 diabetes, heart disease, stroke, and high blood pressure. Exercise can also help improve your mood. Ask your healthcare provider about the best exercise plan for you. General health and safety guidelines:   Do not smoke. Nicotine and other chemicals in cigarettes and cigars can cause lung damage. Ask your healthcare provider for information if you currently smoke and need help to quit. E-cigarettes or smokeless tobacco still contain nicotine. Talk to your healthcare provider before you use these products. Limit alcohol. A drink of alcohol is 12 ounces of beer, 5 ounces of wine, or 1½ ounces of liquor. Lose weight, if needed. Being overweight increases your risk of certain health conditions. These include heart disease, high blood pressure, type 2 diabetes, and certain types of cancer. Protect your skin. Do not sunbathe or use tanning beds. Use sunscreen with a SPF 15 or higher. Apply sunscreen at least 15 minutes before you go outside. Reapply sunscreen every 2 hours. Wear protective clothing, hats, and sunglasses when you are outside. Drive safely. Always wear your seatbelt. Make sure everyone in your car wears a seatbelt. A seatbelt can save your life if you are in an accident. Do not use your cell phone when you are driving. This could distract you and cause an accident. Pull over if you need to make a call or send a text message. Practice safe sex. Use latex condoms if are sexually active and have more than one partner. Your healthcare provider may recommend screening tests for sexually transmitted infections (STIs). Wear helmets, lifejackets, and protective gear. Always wear a helmet when you ride a bike or motorcycle, go skiing, or play sports that could cause a head injury. Wear protective equipment when you play sports. Wear a lifejacket when you are on a boat or doing water sports.     © Copyright [employer] 2023 Information is for End User's use only and may not be sold, redistributed or otherwise used for commercial purposes. The above information is an  only. It is not intended as medical advice for individual conditions or treatments. Talk to your doctor, nurse or pharmacist before following any medical regimen to see if it is safe and effective for you.

## 2023-12-12 NOTE — ASSESSMENT & PLAN NOTE
Lipid panel reviewed with patient. Patient stopped taking atorvastatin few months ago. ASCVD risk score discussed with patient cardiac risk factors discussed with patient. At this time, patient to be restarted on atorvastatin. To obtain repeat blood work in 3 months.

## 2023-12-12 NOTE — PROGRESS NOTES
[address] [facility] [address] [place] [address]    NAME: Rea Hdz  AGE: 61 y.o. SEX: male  : 1960     DATE: 2023     Assessment and Plan:     Problem List Items Addressed This Visit          Endocrine    Type 2 diabetes mellitus without complication, without long-term current use of insulin (HCC)     Recent hemoglobin A1c elevated from previous visit. Patient admits to getting off diabetic diet. Encouraged to monitor carbohydrate intake and watch added sugars. Diabetic foot exam and eye exam completed in office today. To obtain repeat blood work in 3 months. Lab Results   Component Value Date    HGBA1C 6.8 (H) 2023          Relevant Medications    atorvastatin (LIPITOR) 20 mg tablet    Other Relevant Orders    Hemoglobin A1C    Comprehensive metabolic panel    Lipid Panel with Direct LDL reflex    IRIS Diabetic eye exam       Other    Mixed hyperlipidemia     Lipid panel reviewed with patient. Patient stopped taking atorvastatin few months ago. ASCVD risk score discussed with patient cardiac risk factors discussed with patient. At this time, patient to be restarted on atorvastatin. To obtain repeat blood work in 3 months. Relevant Medications    atorvastatin (LIPITOR) 20 mg tablet    Other Relevant Orders    Comprehensive metabolic panel    Lipid Panel with Direct LDL reflex     Other Visit Diagnoses       Annual physical exam    -  Primary              Immunizations and preventive care screenings were discussed with patient today. Appropriate education was printed on patient's after visit summary. Discussed risks and benefits of prostate cancer screening. We discussed the controversial history of PSA screening for prostate cancer in the [facility] as well as the risk of over detection and over treatment of prostate cancer by way of PSA screening.   The patient understands that PSA blood testing is an imperfect way to screen for prostate cancer and that elevated PSA levels in the blood may also be caused by infection, inflammation, prostatic trauma or manipulation, urological procedures, or by benign prostatic enlargement. The role of the digital rectal examination in prostate cancer screening was also discussed and I discussed with him that there is large interobserver variability in the findings of digital rectal examination. Counseling:  Alcohol/drug use: discussed moderation in alcohol intake, the recommendations for healthy alcohol use, and avoidance of illicit drug use. Dental Health: discussed importance of regular tooth brushing, flossing, and dental visits. Injury prevention: discussed safety/seat belts, safety helmets, smoke detectors, carbon dioxide detectors, and smoking near bedding or upholstery. Sexual health: discussed sexually transmitted diseases, partner selection, use of condoms, avoidance of unintended pregnancy, and contraceptive alternatives. Exercise: the importance of regular exercise/physical activity was discussed. Recommend exercise 3-5 times per week for at least 30 minutes. Depression Screening and Follow-up Plan: Patient was screened for depression during today's encounter. They screened negative with a PHQ-2 score of 0. Tobacco Cessation Counseling: Tobacco cessation counseling was provided. The patient is sincerely urged to quit consumption of tobacco. He is not ready to quit tobacco.         Return in 3 months (on 3/12/2024) for Diabetes Follow-up. Chief Complaint:     Chief Complaint   Patient presents with    Physical Exam     Diabetic foot exam and dm eye exam due. History of Present Illness:     Adult Annual Physical   Patient here for a comprehensive physical exam. The patient reports no problems. Diet and Physical Activity  Diet/Nutrition: poor diet and eating out more, increased portions . Exercise: no formal exercise.       Depression Screening  PHQ-2/9 Depression Screening    Little interest or pleasure in doing things: 0 - not at all  Feeling down, depressed, or hopeless: 0 - not at all  PHQ-2 Score: 0  PHQ-2 Interpretation: Negative depression screen       General Health  Sleep: gets 7-8 hours of sleep on average. Hearing: normal - bilateral.  Vision:  wears glasses for reading . Dental: no dental visits for >1 year.  Health  Symptoms include: none    Advanced Care Planning  Do you have an advanced directive? no  Do you have a durable medical power of ? no     Review of Systems:     Review of Systems   Constitutional:  Negative for activity change, appetite change, fatigue and unexpected weight change. HENT:  Negative for congestion, ear pain, sore throat and trouble swallowing. Eyes:  Negative for photophobia and visual disturbance. Respiratory:  Negative for cough, chest tightness and shortness of breath. Cardiovascular:  Negative for chest pain and palpitations. Gastrointestinal:  Negative for abdominal pain, blood in stool, constipation, diarrhea and vomiting. Endocrine: Negative for polydipsia, polyphagia and polyuria. Genitourinary:  Negative for decreased urine volume, dysuria, flank pain, frequency, hematuria, testicular pain and urgency. Musculoskeletal:  Negative for arthralgias, back pain and myalgias. Skin:  Negative for color change and rash. Neurological:  Negative for dizziness, syncope, weakness, light-headedness, numbness and headaches. Hematological:  Negative for adenopathy. Does not bruise/bleed easily. Psychiatric/Behavioral:  Negative for agitation, confusion and dysphoric mood. The patient is not nervous/anxious.        Past Medical History:     Past Medical History:   Diagnosis Date    Colon polyp     Diabetes mellitus ([facility])     Hyperlipidemia     Sciatica     Type 2 diabetes mellitus ([facility])       Past Surgical History:     Past Surgical History:   Procedure Laterality Date COLONOSCOPY        Family History:     Family History   Problem Relation Age of Onset    Diabetes Mother     Alzheimer's disease Mother     Dementia Father     Prostate cancer Father     Hypertension Father     Hyperlipidemia Father       Social History:     Social History     Socioeconomic History    Marital status: /Civil Union     Spouse name: None    Number of children: None    Years of education: None    Highest education level: None   Occupational History    None   Tobacco Use    Smoking status: Some Days     Types: Cigars    Smokeless tobacco: Never    Tobacco comments:     Occasionally   Vaping Use    Vaping Use: Never used   Substance and Sexual Activity    Alcohol use: Yes     Comment: few times a week    Drug use: Not Currently    Sexual activity: None   Other Topics Concern    None   Social History Narrative    None     Social Determinants of Health     Financial Resource Strain: Not on file   Food Insecurity: Not on file   Transportation Needs: Not on file   Physical Activity: Not on file   Stress: Not on file   Social Connections: Not on file   Intimate Partner Violence: Not on file   Housing Stability: Not on file      Current Medications:     Current Outpatient Medications   Medication Sig Dispense Refill    atorvastatin (LIPITOR) 20 mg tablet Take 1 tablet (20 mg total) by mouth every evening 90 tablet 1    Blood Glucose Monitoring Suppl (OneTouch Verio Reflect) w/Device KIT Check blood sugars once daily. Please substitute with appropriate alternative as covered by patient's insurance. Dx: E11.65 1 kit 0    gabapentin (NEURONTIN) 300 mg capsule Take 1 capsule (300 mg total) by mouth 3 (three) times a day 270 capsule 1    glimepiride (AMARYL) 2 mg tablet Take 1 tablet (2 mg total) by mouth daily with breakfast 90 tablet 1    glucose blood (OneTouch Verio) test strip Check blood sugars once daily. Please substitute with appropriate alternative as covered by patient's insurance.  Dx: E11.65 100 each 3    hydrochlorothiazide (HYDRODIURIL) 12.5 mg tablet Take 1 tablet (12.5 mg total) by mouth daily 90 tablet 1    lisinopril (ZESTRIL) 40 mg tablet Take 1 tablet (40 mg total) by mouth daily 90 tablet 1    OneTouch Delica Lancets 14W MISC Check blood sugars once daily. Please substitute with appropriate alternative as covered by patient's insurance. Dx: E11.65 100 each 3     No current facility-administered medications for this visit. Allergies:     No Known Allergies   Physical Exam:     /82 (BP Location: Left arm, Patient Position: Sitting, Cuff Size: Standard)   Pulse (!) 109   Temp (!) 97.4 °F (36.3 °C) (Tympanic)   Ht 5' 10" (1.778 m)   Wt 85.3 kg (188 lb)   SpO2 99%   BMI 26.98 kg/m²     Physical Exam  Vitals reviewed. Constitutional:       General: He is not in acute distress. Appearance: Normal appearance. He is well-developed. He is not ill-appearing. HENT:      Head: Normocephalic and atraumatic. Right Ear: Tympanic membrane, ear canal and external ear normal.      Left Ear: Tympanic membrane, ear canal and external ear normal.      Nose: Nose normal.      Mouth/Throat:      Mouth: Mucous membranes are moist.      Pharynx: Oropharynx is clear. Eyes:      Extraocular Movements: Extraocular movements intact. Conjunctiva/sclera: Conjunctivae normal.      Pupils: Pupils are equal, round, and reactive to light. Cardiovascular:      Rate and Rhythm: Normal rate and regular rhythm. Pulses: Normal pulses. no weak pulses          Dorsalis pedis pulses are 2+ on the right side and 2+ on the left side. Heart sounds: Normal heart sounds. No murmur heard. Pulmonary:      Effort: Pulmonary effort is normal. No respiratory distress. Breath sounds: Normal breath sounds. Abdominal:      General: Bowel sounds are normal.      Palpations: Abdomen is soft. Tenderness: There is no abdominal tenderness. There is no right CVA tenderness or left CVA tenderness. Musculoskeletal:         General: No swelling. Normal range of motion. Cervical back: Normal range of motion and neck supple. Right lower leg: No edema. Left lower leg: No edema. Feet:      Right foot:      Skin integrity: No ulcer, skin breakdown, erythema, warmth, callus or dry skin. Left foot:      Skin integrity: No ulcer, skin breakdown, erythema, warmth, callus or dry skin. Lymphadenopathy:      Cervical: No cervical adenopathy. Skin:     General: Skin is warm and dry. Capillary Refill: Capillary refill takes less than 2 seconds. Neurological:      General: No focal deficit present. Mental Status: He is alert and oriented to person, place, and time. Psychiatric:         Mood and Affect: Mood normal.         Behavior: Behavior normal.          Patient's shoes and socks removed. Right Foot/Ankle   Right Foot Inspection  Skin Exam: skin normal and skin intact. No dry skin, no warmth, no callus, no erythema, no maceration, no abnormal color, no pre-ulcer, no ulcer and no callus. Toe Exam: ROM and strength within normal limits. Sensory   Monofilament testing: intact    Vascular  Capillary refills: < 3 seconds  The right DP pulse is 2+. Left Foot/Ankle  Left Foot Inspection  Skin Exam: skin normal and skin intact. No dry skin, no warmth, no erythema, no maceration, normal color, no pre-ulcer, no ulcer and no callus. Toe Exam: ROM and strength within normal limits. Sensory   Monofilament testing: intact    Vascular  Capillary refills: < 3 seconds  The left DP pulse is 2+.      Assign Risk Category  No deformity present  No loss of protective sensation  No weak pulses  Risk: 0       Elo Conway, [address] [facility]

## 2023-12-12 NOTE — ASSESSMENT & PLAN NOTE
Recent hemoglobin A1c elevated from previous visit. Patient admits to getting off diabetic diet. Encouraged to monitor carbohydrate intake and watch added sugars. Diabetic foot exam and eye exam completed in office today. To obtain repeat blood work in 3 months.     Lab Results   Component Value Date    HGBA1C 6.8 (H) 12/02/2023

## 2023-12-12 NOTE — TELEPHONE ENCOUNTER
----- Message from Melodie Lux MD sent at 12/12/2023  3:49 PM EST -----  XRAY WITH only mild arthritis of the hip.  Keep appt with Dr. Anirudh Nathan to evaluate groin pain on left

## 2023-12-18 ENCOUNTER — TELEPHONE (OUTPATIENT)
Dept: FAMILY MEDICINE CLINIC | Facility: CLINIC | Age: 63
End: 2023-12-18

## 2023-12-18 NOTE — TELEPHONE ENCOUNTER
Hi there!  Are we able to resubmit his IRIS Exam images from 12/12? They are not resulted yet.    Thank you!

## 2023-12-21 ENCOUNTER — OFFICE VISIT (OUTPATIENT)
Dept: OBGYN CLINIC | Facility: CLINIC | Age: 63
End: 2023-12-21
Payer: COMMERCIAL

## 2023-12-21 VITALS
SYSTOLIC BLOOD PRESSURE: 173 MMHG | HEIGHT: 70 IN | BODY MASS INDEX: 26.92 KG/M2 | WEIGHT: 188 LBS | HEART RATE: 92 BPM | DIASTOLIC BLOOD PRESSURE: 94 MMHG

## 2023-12-21 DIAGNOSIS — S76.012A HIP STRAIN, LEFT, INITIAL ENCOUNTER: ICD-10-CM

## 2023-12-21 DIAGNOSIS — M16.12 ARTHRITIS OF LEFT HIP: Primary | ICD-10-CM

## 2023-12-21 DIAGNOSIS — M62.9 HAMSTRING TIGHTNESS OF BOTH LOWER EXTREMITIES: ICD-10-CM

## 2023-12-21 PROCEDURE — 99204 OFFICE O/P NEW MOD 45 MIN: CPT | Performed by: FAMILY MEDICINE

## 2023-12-21 RX ORDER — MELOXICAM 15 MG/1
15 TABLET ORAL DAILY
Qty: 30 TABLET | Refills: 1 | Status: SHIPPED | OUTPATIENT
Start: 2023-12-21

## 2023-12-21 NOTE — PATIENT INSTRUCTIONS
Over the counter vitamins:    - turmeric vitamin at least 1000 mg daily    - tart cherry vitamin at least 1000 mg daily    - glucosamine-chondrointin 2-3 times daily      Rx: Meloxicam 15  - once a day  - eat first  - everyday  - 30 days  - no ibuprofen  - no aleve  - tylenol is ok

## 2023-12-21 NOTE — PROGRESS NOTES
Assessment/Plan:  Assessment/Plan   Diagnoses and all orders for this visit:    Arthritis of left hip  -     Ambulatory Referral to Sports Medicine  -     meloxicam (Mobic) 15 mg tablet; Take 1 tablet (15 mg total) by mouth daily  -     Ambulatory Referral to Physical Therapy; Future    Hip strain, left, initial encounter  -     Ambulatory Referral to Physical Therapy; Future    Hamstring tightness of both lower extremities  -     Ambulatory Referral to Physical Therapy; Future      63-year-old active male with left hip/groin pain more than 1 year duration.  Discussed with patient physical exam, imaging studies, impression, and plan.  X-rays left hip noted for mild degenerative changes.  Physical exam lumbar spine unremarkable for midline or paraspinal tenderness.  He has intact range of motion lumbar spine.  There is no groin pain with JUAN C and FADDIR of the hips.  He has hamstring tightness both lower extremities.  Clinical impression is that he has symptoms from combination of degenerative changes and strain of the hip.  I discussed treatment regimen of anti-inflammatory, supplements, and formal therapy.  Invasive management such as surgery and injection not warranted.  He is to take meloxicam 15 mg once daily with food for 30 days and not to take ibuprofen or Aleve, but may take Tylenol.  He is to take turmeric vitamin at least 1000 mg daily, tart cherry vitamin at least 1000 mg a day, and glucosamine 2-3 times a day.  I will refer him to formal therapy which she start as soon as possible and do home exercises as directed.  He will follow-up with me as needed.          Subjective:   Patient ID: Rory Tsai is a 63 y.o. male.  Chief Complaint   Patient presents with    Left Hip - Pain        63-year-old active male presents for evaluation of left hip/groin pain more than 1 year duration.  He denies any particular trauma or inciting event.  Pain described as gradual in onset, localized to left anterior hip/groin  area, none radiating, achy and sore and sometimes sharp, worse when rising from prolonged flexed position, and improved with stretching and warm up activity.  He states that when he is working in crouched position for prolonged duration and then attempt to stand he experiences the pain, but after stretching for few seconds symptoms improved and he is able to continue being physically active.  He does not have any issue with getting in and out of vehicles. Does not usually take medication for symptoms.  He had follow-up appointment with pain management.   He was referred for imaging studies and referred to sports medicine.    Hip Pain  This is a chronic problem. The current episode started more than 1 year ago. The problem occurs daily. The problem has been unchanged. Associated symptoms include arthralgias. Pertinent negatives include no abdominal pain, chest pain, chills, fever, joint swelling, numbness, rash, sore throat or weakness. The symptoms are aggravated by bending. He has tried rest and position changes (Stretching) for the symptoms. The treatment provided mild relief.           The following portions of the patient's history were reviewed and updated as appropriate: He  has a past medical history of Colon polyp, Diabetes mellitus (HCC), Hyperlipidemia, Sciatica, and Type 2 diabetes mellitus (HCC).  He has No Known Allergies..    Review of Systems   Constitutional:  Negative for chills and fever.   HENT:  Negative for sore throat.    Eyes:  Negative for visual disturbance.   Respiratory:  Negative for shortness of breath.    Cardiovascular:  Negative for chest pain.   Gastrointestinal:  Negative for abdominal pain.   Genitourinary:  Negative for flank pain.   Musculoskeletal:  Positive for arthralgias. Negative for joint swelling.   Skin:  Negative for rash and wound.   Neurological:  Negative for weakness and numbness.   Hematological:  Does not bruise/bleed easily.   Psychiatric/Behavioral:  Negative for  "self-injury.        Objective:  Vitals:    12/21/23 1256   BP: (!) 173/94   Pulse: 92   Weight: 85.3 kg (188 lb)   Height: 5' 10\" (1.778 m)      Right Ankle Exam     Muscle Strength   Dorsiflexion:  5/5  Plantar flexion:  5/5       Left Ankle Exam     Muscle Strength   Dorsiflexion:  5/5   Plantar flexion:  5/5       Right Hip Exam     Muscle Strength   Flexion: 5/5     Tests   JUAN C: negative    Comments:  Negative FADDIR  Hamstring tightness      Left Hip Exam     Muscle Strength   Flexion: 5/5     Tests   JUAN C: negative    Comments:  Negative FADDIR  Hamstring tightness      Back Exam     Tenderness   The patient is experiencing no tenderness.     Range of Motion   The patient has normal back ROM.    Muscle Strength   Right Quadriceps:  5/5   Left Quadriceps:  5/5     Tests   Straight leg raise right: negative  Straight leg raise left: negative    Reflexes   Patellar:  normal    Other   Sensation: normal  Gait: normal           Strength/Myotome Testing     Left Ankle/Foot   Dorsiflexion: 5  Plantar flexion: 5    Right Ankle/Foot   Dorsiflexion: 5  Plantar flexion: 5      Physical Exam  Vitals and nursing note reviewed.   Constitutional:       Appearance: He is well-developed. He is not ill-appearing or diaphoretic.   HENT:      Head: Normocephalic and atraumatic.      Right Ear: External ear normal.      Left Ear: External ear normal.   Eyes:      Conjunctiva/sclera: Conjunctivae normal.   Neck:      Trachea: No tracheal deviation.   Cardiovascular:      Rate and Rhythm: Normal rate.   Pulmonary:      Effort: Pulmonary effort is normal. No respiratory distress.   Abdominal:      General: There is no distension.   Musculoskeletal:         General: No swelling, tenderness, deformity or signs of injury.      Lumbar back: Negative right straight leg raise test and negative left straight leg raise test.   Skin:     General: Skin is warm and dry.      Coloration: Skin is not jaundiced or pale.   Neurological:      " Mental Status: He is alert and oriented to person, place, and time.   Psychiatric:         Mood and Affect: Mood normal.         Behavior: Behavior normal.         Thought Content: Thought content normal.         Judgment: Judgment normal.         I have personally reviewed pertinent films in PACS and my interpretation is  .  Mild degenerative changes left hip.

## 2023-12-21 NOTE — LETTER
December 21, 2023     Austin Zapien MD  [address] [place], [place] [address]    Patient: Rory Tsai   YOB: 1960   Date of Visit: 12/21/2023       Dear Dr. Zapien:    Thank you for referring Rory Tsai to me for evaluation. Below are my notes for this consultation.    If you have questions, please do not hesitate to call me. I look forward to following your patient along with you.         Sincerely,        DecshirleneJovani Coleman,         CC: No Recipients    Krystal Coleman DO  12/21/2023  4:45 PM  Sign when Signing Visit  Assessment/Plan:  Assessment/Plan  Diagnoses and all orders for this visit:    Arthritis of left hip  -     Ambulatory Referral to Sports Medicine  -     meloxicam (Mobic) 15 mg tablet; Take 1 tablet (15 mg total) by mouth daily  -     Ambulatory Referral to Physical Therapy; Future    Hip strain, left, initial encounter  -     Ambulatory Referral to Physical Therapy; Future    Hamstring tightness of both lower extremities  -     Ambulatory Referral to Physical Therapy; Future      63-year-old active male with left hip/groin pain more than 1 year duration.  Discussed with patient physical exam, imaging studies, impression, and plan.  X-rays left hip noted for mild degenerative changes.  Physical exam lumbar spine unremarkable for midline or paraspinal tenderness.  He has intact range of motion lumbar spine.  There is no groin pain with JUAN C and FADDIR of the hips.  He has hamstring tightness both lower extremities.  Clinical impression is that he has symptoms from combination of degenerative changes and strain of the hip.  I discussed treatment regimen of anti-inflammatory, supplements, and formal therapy.  Invasive management such as surgery and injection not warranted.  He is to take meloxicam 15 mg once daily with food for 30 days and not to take ibuprofen or Aleve, but may take Tylenol.  He is to take turmeric vitamin at least 1000 mg daily, tart  cherry vitamin at least 1000 mg a day, and glucosamine 2-3 times a day.  I will refer him to formal therapy which she start as soon as possible and do home exercises as directed.  He will follow-up with me as needed.          Subjective:   Patient ID: Rory Tsai is a 63 y.o. male.  Chief Complaint   Patient presents with   • Left Hip - Pain        63-year-old active male presents for evaluation of left hip/groin pain more than 1 year duration.  He denies any particular trauma or inciting event.  Pain described as gradual in onset, localized to left anterior hip/groin area, none radiating, achy and sore and sometimes sharp, worse when rising from prolonged flexed position, and improved with stretching and warm up activity.  He states that when he is working in crouched position for prolonged duration and then attempt to stand he experiences the pain, but after stretching for few seconds symptoms improved and he is able to continue being physically active.  He does not have any issue with getting in and out of vehicles. Does not usually take medication for symptoms.  He had follow-up appointment with pain management.   He was referred for imaging studies and referred to sports medicine.    Hip Pain  This is a chronic problem. The current episode started more than 1 year ago. The problem occurs daily. The problem has been unchanged. Associated symptoms include arthralgias. Pertinent negatives include no abdominal pain, chest pain, chills, fever, joint swelling, numbness, rash, sore throat or weakness. The symptoms are aggravated by bending. He has tried rest and position changes (Stretching) for the symptoms. The treatment provided mild relief.           The following portions of the patient's history were reviewed and updated as appropriate: He  has a past medical history of Colon polyp, Diabetes mellitus (HCC), Hyperlipidemia, Sciatica, and Type 2 diabetes mellitus (HCC).  He has No Known Allergies..    Review of  "Systems   Constitutional:  Negative for chills and fever.   HENT:  Negative for sore throat.    Eyes:  Negative for visual disturbance.   Respiratory:  Negative for shortness of breath.    Cardiovascular:  Negative for chest pain.   Gastrointestinal:  Negative for abdominal pain.   Genitourinary:  Negative for flank pain.   Musculoskeletal:  Positive for arthralgias. Negative for joint swelling.   Skin:  Negative for rash and wound.   Neurological:  Negative for weakness and numbness.   Hematological:  Does not bruise/bleed easily.   Psychiatric/Behavioral:  Negative for self-injury.        Objective:  Vitals:    12/21/23 1256   BP: (!) 173/94   Pulse: 92   Weight: 85.3 kg (188 lb)   Height: 5' 10\" (1.778 m)      Right Ankle Exam     Muscle Strength   Dorsiflexion:  5/5  Plantar flexion:  5/5       Left Ankle Exam     Muscle Strength   Dorsiflexion:  5/5   Plantar flexion:  5/5       Right Hip Exam     Muscle Strength   Flexion: 5/5     Tests   JUAN C: negative    Comments:  Negative FADDIR  Hamstring tightness      Left Hip Exam     Muscle Strength   Flexion: 5/5     Tests   JUAN C: negative    Comments:  Negative FADDIR  Hamstring tightness      Back Exam     Tenderness   The patient is experiencing no tenderness.     Range of Motion   The patient has normal back ROM.    Muscle Strength   Right Quadriceps:  5/5   Left Quadriceps:  5/5     Tests   Straight leg raise right: negative  Straight leg raise left: negative    Reflexes   Patellar:  normal    Other   Sensation: normal  Gait: normal           Strength/Myotome Testing     Left Ankle/Foot   Dorsiflexion: 5  Plantar flexion: 5    Right Ankle/Foot   Dorsiflexion: 5  Plantar flexion: 5      Physical Exam  Vitals and nursing note reviewed.   Constitutional:       Appearance: He is well-developed. He is not ill-appearing or diaphoretic.   HENT:      Head: Normocephalic and atraumatic.      Right Ear: External ear normal.      Left Ear: External ear normal.   Eyes:    "   Conjunctiva/sclera: Conjunctivae normal.   Neck:      Trachea: No tracheal deviation.   Cardiovascular:      Rate and Rhythm: Normal rate.   Pulmonary:      Effort: Pulmonary effort is normal. No respiratory distress.   Abdominal:      General: There is no distension.   Musculoskeletal:         General: No swelling, tenderness, deformity or signs of injury.      Lumbar back: Negative right straight leg raise test and negative left straight leg raise test.   Skin:     General: Skin is warm and dry.      Coloration: Skin is not jaundiced or pale.   Neurological:      Mental Status: He is alert and oriented to person, place, and time.   Psychiatric:         Mood and Affect: Mood normal.         Behavior: Behavior normal.         Thought Content: Thought content normal.         Judgment: Judgment normal.         I have personally reviewed pertinent films in PACS and my interpretation is  .  Mild degenerative changes left hip.

## 2024-01-05 ENCOUNTER — VBI (OUTPATIENT)
Dept: ADMINISTRATIVE | Facility: OTHER | Age: 64
End: 2024-01-05

## 2024-01-22 ENCOUNTER — EVALUATION (OUTPATIENT)
Dept: PHYSICAL THERAPY | Facility: CLINIC | Age: 64
End: 2024-01-22
Payer: COMMERCIAL

## 2024-01-22 DIAGNOSIS — M62.9 HAMSTRING TIGHTNESS OF BOTH LOWER EXTREMITIES: ICD-10-CM

## 2024-01-22 DIAGNOSIS — M16.12 ARTHRITIS OF LEFT HIP: Primary | ICD-10-CM

## 2024-01-22 DIAGNOSIS — S76.012A HIP STRAIN, LEFT, INITIAL ENCOUNTER: ICD-10-CM

## 2024-01-22 DIAGNOSIS — S76.012D HIP STRAIN, LEFT, SUBSEQUENT ENCOUNTER: ICD-10-CM

## 2024-01-22 PROCEDURE — 97110 THERAPEUTIC EXERCISES: CPT | Performed by: PHYSICAL THERAPIST

## 2024-01-22 PROCEDURE — 97161 PT EVAL LOW COMPLEX 20 MIN: CPT | Performed by: PHYSICAL THERAPIST

## 2024-01-25 ENCOUNTER — OFFICE VISIT (OUTPATIENT)
Dept: PHYSICAL THERAPY | Facility: CLINIC | Age: 64
End: 2024-01-25
Payer: COMMERCIAL

## 2024-01-25 DIAGNOSIS — S76.012A HIP STRAIN, LEFT, INITIAL ENCOUNTER: ICD-10-CM

## 2024-01-25 DIAGNOSIS — M16.12 ARTHRITIS OF LEFT HIP: Primary | ICD-10-CM

## 2024-01-25 DIAGNOSIS — S76.012D HIP STRAIN, LEFT, SUBSEQUENT ENCOUNTER: ICD-10-CM

## 2024-01-25 DIAGNOSIS — M62.9 HAMSTRING TIGHTNESS OF BOTH LOWER EXTREMITIES: ICD-10-CM

## 2024-01-25 PROCEDURE — 97110 THERAPEUTIC EXERCISES: CPT

## 2024-01-25 PROCEDURE — 97140 MANUAL THERAPY 1/> REGIONS: CPT

## 2024-01-25 NOTE — PROGRESS NOTES
"Daily Note     Today's date: 2024  Patient name: Rory Tsai  : 1960  MRN: [id number]  Referring provider: Krystal Coleman*  Dx:   Encounter Diagnosis     ICD-10-CM    1. Arthritis of left hip  M16.12       2. Hamstring tightness of both lower extremities  M62.9       3. Hip strain, left, subsequent encounter  S76.012D       4. Hip strain, left, initial encounter  S76.012A                      Subjective: Pt reports his L hip is still uncomfortable when returning to standing from squatting position.        Objective: See treatment diary below      Assessment: Normal tone in L psoas.  IR ROM limitations are present.  Improvement in overall hip ER C flex mobility following manual therapy.  Patient demonstrates hamstring dominant hip extension.       Plan: Continue per plan of care.         POC expires Unit limit Auth Expiration date PT/OT/ST + Visit Limit?   3/4/24 3/day  [facility]                           Visit/Unit Tracking  AUTH Status:  Date              Approved - 18 visits Used 1 2              Remaining  17 16                   Precautions: Type II DM, sciatica, h/o L foot drop       Manuals 24      L Hip - Hams, Piriformis, hip flexor        Psoas release  AF      P-A hip mob  SC, PT      M-L hip mob  SC, PT      Neuro Re-Ed         BOSU Lunges        SLS        Tandem Stance        Sidestepping        SLS on foam, ball toss at rebounder                        Ther Ex        Bike  5' hip ROM      Self Hams Stretch w/strap 30\"x3       Self Piriformis Stretch 30\"x3       Prone Quad stretch w/strap, roll under distal thigh 30\"x3       SLR x 3 on foam        LAQ        SLR        Bridges  Frog bridge 3x10      S/L Hip Abd        Clamshells  Rev clam 3x10      Prone Hip Ext  C knee bent 2x10      Total Gym        Leg Press - DL & SL        Prone ER iso  5\"x20      Ther Activity        Stepups F/L        Stepdowns        Gait Training                        Modalities           "

## 2024-01-29 ENCOUNTER — APPOINTMENT (OUTPATIENT)
Dept: PHYSICAL THERAPY | Facility: CLINIC | Age: 64
End: 2024-01-29
Payer: COMMERCIAL

## 2024-01-29 NOTE — PROGRESS NOTES
"Daily Note     Today's date: 2024  Patient name: Rory Tsai  : 1960  MRN: [id number]  Referring provider: Krystal Coleman*  Dx:   Encounter Diagnosis     ICD-10-CM    1. Arthritis of left hip  M16.12       2. Hamstring tightness of both lower extremities  M62.9       3. Hip strain, left, subsequent encounter  S76.012D       4. Hip strain, left, initial encounter  S76.012A                      Subjective:       Objective: See treatment diary below      Assessment: Tolerated treatment {Tolerated treatment :[id number]}. Patient would benefit from continued PT      Plan: Continue per plan of care.      POC expires Unit limit Auth Expiration date PT/OT/ST + Visit Limit?   3/4/24 3/day  [facility]                           Visit/Unit Tracking  AUTH Status:  Date             Approved - 18 visits Used 1 2 3             Remaining  17 16 15                  Precautions: Type II DM, sciatica, h/o L foot drop       Manuals 24     L Hip - Hams, Piriformis, hip flexor        Psoas release  AF ML     P-A hip mob  SC, PT ML     M-L hip mob  SC, PT ML     Neuro Re-Ed         BOSU Lunges        SLS        Tandem Stance        Sidestepping        SLS on foam, ball toss at rebounder                        Ther Ex        Bike  5' hip ROM 5' Hip ROM     Self Hams Stretch w/strap 30\"x3       Self Piriformis Stretch 30\"x3       Prone Quad stretch w/strap, roll under distal thigh 30\"x3       SLR x 3 on foam        LAQ        SLR        Bridges  Frog bridge 3x10 From Bridge 3x10     S/L Hip Abd        Clamshells  Rev clam 3x10 Rev Clam 3x10     Prone Hip Ext  C knee bent 2x10 C knee bent 2x10     Total Gym        Leg Press - DL & SL        Prone ER iso  5\"x20 5\"x20     Ther Activity        Stepups F/L        Stepdowns        Gait Training                        Modalities                                      "

## 2024-02-01 ENCOUNTER — OFFICE VISIT (OUTPATIENT)
Dept: PHYSICAL THERAPY | Facility: CLINIC | Age: 64
End: 2024-02-01
Payer: COMMERCIAL

## 2024-02-01 DIAGNOSIS — M62.9 HAMSTRING TIGHTNESS OF BOTH LOWER EXTREMITIES: ICD-10-CM

## 2024-02-01 DIAGNOSIS — M16.12 ARTHRITIS OF LEFT HIP: Primary | ICD-10-CM

## 2024-02-01 DIAGNOSIS — S76.012D HIP STRAIN, LEFT, SUBSEQUENT ENCOUNTER: ICD-10-CM

## 2024-02-01 DIAGNOSIS — S76.012A HIP STRAIN, LEFT, INITIAL ENCOUNTER: ICD-10-CM

## 2024-02-01 PROCEDURE — 97110 THERAPEUTIC EXERCISES: CPT

## 2024-02-01 PROCEDURE — 97140 MANUAL THERAPY 1/> REGIONS: CPT

## 2024-02-01 NOTE — PROGRESS NOTES
"Daily Note     Today's date: 2024  Patient name: Rory Tsai  : 1960  MRN: [id number]  Referring provider: Krystal Coleman*  Dx:   Encounter Diagnosis     ICD-10-CM    1. Arthritis of left hip  M16.12       2. Hamstring tightness of both lower extremities  M62.9       3. Hip strain, left, subsequent encounter  S76.012D       4. Hip strain, left, initial encounter  S76.012A                      Subjective: Pt reports his L hip is not worse, but has not noticed any significant changes when he is trying to stand from squatting position.        Objective: See treatment diary below      Assessment: Improvement in kneeling/deep squatting sx following charted interventions today.  Patient reports improvement in overall mobility following this.  He finds relief from manual hip flexor stretching.  Lacks hip ext ROM.  Pt would benefit from continued PT in order to improve L hip strength and stability for improved function during daily activities.      Plan: Continue per plan of care.      POC expires Unit limit Auth Expiration date PT/OT/ST + Visit Limit?   3/4/24 3/day  [facility]                           Visit/Unit Tracking  AUTH Status:  Date             Approved - 18 visits Used 1 2 3             Remaining  17 16 15                  Precautions: Type II DM, sciatica, h/o L foot drop       Manuals 24     L Hip - Hams, Piriformis, hip flexor   L hip flexor stretch - AF     Psoas release  AF      P-A hip mob  SC, PT SC, PT     M-L hip mob  SC, PT      Neuro Re-Ed         LUDMILA Rubio        SLS        Tandem Stance        Sidestepping        SLS on foam, ball toss at rebounder                        Ther Ex        Bike  5' hip ROM 5' hip ROM     Self Hams Stretch w/strap 30\"x3       Self Piriformis Stretch 30\"x3       Prone Quad stretch w/strap, roll under distal thigh 30\"x3  Supine 30\"x3     SLR x 3 on foam        LAQ        SLR        Bridges  Frog bridge 3x10 Frog bridge 2x10   " "  S/L Hip Abd        Clamshells  Rev clam 3x10 Rev clam 2# 2x10     Prone Hip Ext  C knee bent 2x10 C knee bent 2x10     Total Gym        Leg Press - DL & SL        Prone ER iso  5\"x20              Ther Activity        Stepups F/L        Stepdowns        Gait Training                        Modalities                                      "

## 2024-02-05 ENCOUNTER — OFFICE VISIT (OUTPATIENT)
Dept: PHYSICAL THERAPY | Facility: CLINIC | Age: 64
End: 2024-02-05
Payer: COMMERCIAL

## 2024-02-05 DIAGNOSIS — M16.12 ARTHRITIS OF LEFT HIP: Primary | ICD-10-CM

## 2024-02-05 DIAGNOSIS — M62.9 HAMSTRING TIGHTNESS OF BOTH LOWER EXTREMITIES: ICD-10-CM

## 2024-02-05 DIAGNOSIS — S76.012D HIP STRAIN, LEFT, SUBSEQUENT ENCOUNTER: ICD-10-CM

## 2024-02-05 PROCEDURE — 97110 THERAPEUTIC EXERCISES: CPT

## 2024-02-05 PROCEDURE — 97140 MANUAL THERAPY 1/> REGIONS: CPT

## 2024-02-05 NOTE — PROGRESS NOTES
"Daily Note     Today's date: 2024  Patient name: Rory Tsai  : 1960  MRN: [id number]  Referring provider: Krystal Coleman*  Dx:   Encounter Diagnosis     ICD-10-CM    1. Arthritis of left hip  M16.12       2. Hamstring tightness of both lower extremities  M62.9       3. Hip strain, left, subsequent encounter  S76.012D           Start Time: 1715  Stop Time: 1803  Total time in clinic (min): 48 minutes    Subjective: Pt reports that the pain takes longer to come on now and when it does come on, it's not as intense. He reports that he hasn't been very diligent with his HEP.       Objective: See treatment diary below      Assessment: Continues to lack hip extension. PT notes improved hip mobility with manual therapy. Will continue to address glute weakness and hip mobility. Pt would benefit from continued PT in order to improve L hip strength and stability for improved function during daily activities.      Plan: Continue per plan of care.      POC expires Unit limit Auth Expiration date PT/OT/ST + Visit Limit?   3/4/24 3/day  [facility]                           Visit/Unit Tracking  AUTH Status:  Date            Approved - 18 visits Used 1 2 3 4            Remaining  17 16 15 14                 Precautions: Type II DM, sciatica, h/o L foot drop       Date     FOTO        Re-Eval        Manuals        L Hip - Hams, Piriformis, hip flexor   L hip flexor stretch - AF     Psoas release  AF      P-A hip mob  SC, PT SC, PT SC    M-L hip mob  SC, PT      Neuro Re-Ed         BOSU Lunges        SLS        Tandem Stance        Sidestepping        SLS on foam, ball toss at rebounder                        Ther Ex        Bike  5' hip ROM 5' hip ROM 5' hip ROM    Self Hams Stretch w/strap 30\"x3       Self Piriformis Stretch 30\"x3   30\"x3    Prone Quad stretch w/strap, roll under distal thigh 30\"x3  Supine 30\"x3 Supine c strap 30\"x3     SLR x 3 on foam        LAQ        SLR      " "  Bridges  Frog bridge 3x10 Frog bridge 2x10 Frog bridge 3x10    S/L Hip Abd        Clamshells  Rev clam 3x10 Rev clam 2# 2x10 Rev clam 3# 3x10    Prone Hip Ext  C knee bent 2x10 C knee bent 2x10 C knee bent 3x10    Prone ER iso  5\"x20      TRX Squat    2x10                     Ther Activity        Stepups F/L        Stepdowns        Gait Training                        Modalities                                      "

## 2024-02-08 ENCOUNTER — OFFICE VISIT (OUTPATIENT)
Dept: PHYSICAL THERAPY | Facility: CLINIC | Age: 64
End: 2024-02-08
Payer: COMMERCIAL

## 2024-02-08 DIAGNOSIS — S76.012D HIP STRAIN, LEFT, SUBSEQUENT ENCOUNTER: ICD-10-CM

## 2024-02-08 DIAGNOSIS — M62.9 HAMSTRING TIGHTNESS OF BOTH LOWER EXTREMITIES: ICD-10-CM

## 2024-02-08 DIAGNOSIS — M16.12 ARTHRITIS OF LEFT HIP: Primary | ICD-10-CM

## 2024-02-08 PROCEDURE — 97110 THERAPEUTIC EXERCISES: CPT

## 2024-02-08 PROCEDURE — 97140 MANUAL THERAPY 1/> REGIONS: CPT

## 2024-02-08 NOTE — PROGRESS NOTES
"Daily Note     Today's date: 2024  Patient name: Rory Tsai  : 1960  MRN: [id number]  Referring provider: Krystal Coleman*  Dx:   Encounter Diagnosis     ICD-10-CM    1. Arthritis of left hip  M16.12       2. Hamstring tightness of both lower extremities  M62.9       3. Hip strain, left, subsequent encounter  S76.012D           Start Time: 1630  Stop Time: 1713  Total time in clinic (min): 43 minutes    Subjective: Pt reports no new changes.      Objective: See treatment diary below      Assessment: PT notes improved hip joint mobility with manual therapy. Able to tolerate progressed resistance without pain. Glute med weakness remains and pt is fatigued post session. Pt would benefit from continued PT in order to improve L hip strength and stability for improved function during daily activities.      Plan: Continue per plan of care.      POC expires Unit limit Auth Expiration date PT/OT/ST + Visit Limit?   3/4/24 3/day  [facility]                           Visit/Unit Tracking  AUTH Status:  Date           Approved - 18 visits Used 1 2 3 4 5           Remaining  17 16 15 14 13                Precautions: Type II DM, sciatica, h/o L foot drop       Date    FOTO        Re-Eval        Manuals        L Hip - Hams, Piriformis, hip flexor   L hip flexor stretch - AF     Psoas release  AF      P-A hip mob  SC, PT SC, PT SC SC   M-L hip mob  SC, PT      Neuro Re-Ed         BOSU Lunges        SLS        Tandem Stance        Sidestepping        SLS on foam, ball toss at rebounder                        Ther Ex        Bike  5' hip ROM 5' hip ROM 5' hip ROM 5' hip ROM   Self Hams Stretch w/strap 30\"x3       Self Piriformis Stretch 30\"x3   30\"x3 30\"x3    Prone Quad stretch w/strap, roll under distal thigh 30\"x3  Supine 30\"x3 Supine c strap 30\"x3  \   Bridges  Frog bridge 3x10 Frog bridge 2x10 Frog bridge 3x10 Frog bridge 3x10   S/L Hip Abd     4# x20   Clamshells  Rev " "clam 3x10 Rev clam 2# 2x10 Rev clam 3# 3x10 Rev clam 4# x20   Prone Hip Ext  C knee bent 2x10 C knee bent 2x10 C knee bent 3x10 C knee bent 4# 2x10   Prone ER iso  5\"x20      TRX Squat    2x10                     Ther Activity        Stepups F/L        Stepdowns        Gait Training                        Modalities                                      "

## 2024-02-12 ENCOUNTER — OFFICE VISIT (OUTPATIENT)
Dept: PHYSICAL THERAPY | Facility: CLINIC | Age: 64
End: 2024-02-12
Payer: COMMERCIAL

## 2024-02-12 DIAGNOSIS — M16.12 ARTHRITIS OF LEFT HIP: Primary | ICD-10-CM

## 2024-02-12 DIAGNOSIS — S76.012D HIP STRAIN, LEFT, SUBSEQUENT ENCOUNTER: ICD-10-CM

## 2024-02-12 DIAGNOSIS — M62.9 HAMSTRING TIGHTNESS OF BOTH LOWER EXTREMITIES: ICD-10-CM

## 2024-02-12 DIAGNOSIS — S76.012A HIP STRAIN, LEFT, INITIAL ENCOUNTER: ICD-10-CM

## 2024-02-12 PROCEDURE — 97112 NEUROMUSCULAR REEDUCATION: CPT

## 2024-02-12 PROCEDURE — 97110 THERAPEUTIC EXERCISES: CPT

## 2024-02-12 NOTE — PROGRESS NOTES
"Daily Note     Today's date: 2024  Patient name: Rory Tsai  : 1960  MRN: [id number]  Referring provider: Krystal Coleman*  Dx:   Encounter Diagnosis     ICD-10-CM    1. Arthritis of left hip  M16.12       2. Hamstring tightness of both lower extremities  M62.9       3. Hip strain, left, subsequent encounter  S76.012D       4. Hip strain, left, initial encounter  S76.012A                      Subjective: Pt reports he has not had to perform deep squatting often since last visit.  He states his L hip feels about the same.        Objective: See treatment diary below      Assessment: Trialed deep lunge with pain and discomfort reported upon standing.  Pt able to perform deep lunge following manual therapy without complications.  He demonstrates quadricep fatigue.  Challenged with maintaining side plank in modified form.  Pt would benefit from continued PT in order to improve hip strength and mobility for improved function during work activities.      Plan: Continue per plan of care.      POC expires Unit limit Auth Expiration date PT/OT/ST + Visit Limit?   3/4/24 3/day  [facility]                           Visit/Unit Tracking  AUTH Status:  Date          Approved - 18 visits Used 1 2 3 4 5 6          Remaining  17 16 15 14 13 12               Precautions: Type II DM, sciatica, h/o L foot drop       Date    FOTO        Re-Eval        Manuals        L Hip - Hams, Piriformis, hip flexor   L hip flexor stretch - AF     Psoas release  AF      P-A hip mob SC, PT SC, PT SC, PT SC SC   M-L hip mob  SC, PT      Neuro Re-Ed         BOSU Lunges        SLS        Tandem Stance        Sidestepping        SLS on foam, ball toss at rebounder        Side plank  Modified 15\"x4       Long sit SLR over cone x10       Ther Ex        Bike 5' hip ROM 5' hip ROM 5' hip ROM 5' hip ROM 5' hip ROM   Self Hams Stretch w/strap 30\"x3       Self Piriformis Stretch    30\"x3 30\"x3  " "  Prone Quad stretch w/strap, roll under distal thigh   Supine 30\"x3 Supine c strap 30\"x3  \   Bridges PB bridge 3x10 Frog bridge 3x10 Frog bridge 2x10 Frog bridge 3x10 Frog bridge 3x10   S/L Hip Abd     4# x20   Clamshells  Rev clam 3x10 Rev clam 2# 2x10 Rev clam 3# 3x10 Rev clam 4# x20   Prone Hip Ext  C knee bent 2x10 C knee bent 2x10 C knee bent 3x10 C knee bent 4# 2x10   Prone ER iso  5\"x20      TRX Squat    2x10     Deep lunge foot elevated 4\" step 2x10 (HRA)       KB hip flex 5# x15       Ther Activity        Stepups F/L        Stepdowns        Gait Training                        Modalities                                        "

## 2024-02-15 ENCOUNTER — OFFICE VISIT (OUTPATIENT)
Dept: PHYSICAL THERAPY | Facility: CLINIC | Age: 64
End: 2024-02-15
Payer: COMMERCIAL

## 2024-02-15 DIAGNOSIS — S76.012D HIP STRAIN, LEFT, SUBSEQUENT ENCOUNTER: ICD-10-CM

## 2024-02-15 DIAGNOSIS — M62.9 HAMSTRING TIGHTNESS OF BOTH LOWER EXTREMITIES: ICD-10-CM

## 2024-02-15 DIAGNOSIS — M16.12 ARTHRITIS OF LEFT HIP: Primary | ICD-10-CM

## 2024-02-15 DIAGNOSIS — S76.012A HIP STRAIN, LEFT, INITIAL ENCOUNTER: ICD-10-CM

## 2024-02-15 PROCEDURE — 97140 MANUAL THERAPY 1/> REGIONS: CPT

## 2024-02-15 PROCEDURE — 97110 THERAPEUTIC EXERCISES: CPT

## 2024-02-15 PROCEDURE — 97112 NEUROMUSCULAR REEDUCATION: CPT

## 2024-02-15 NOTE — PROGRESS NOTES
"Daily Note     Today's date: 2/15/2024  Patient name: Rory Tsai  : 1960  MRN: [id number]  Referring provider: Krystal Coleman*  Dx:   Encounter Diagnosis     ICD-10-CM    1. Arthritis of left hip  M16.12       2. Hamstring tightness of both lower extremities  M62.9       3. Hip strain, left, subsequent encounter  S76.012D       4. Hip strain, left, initial encounter  S76.012A                      Subjective: Pt reports he had to be in his crouching position for prolonged period while working the last few days.  He states he still experienced L hip pain upon standing but this was an improvement in sx since prior to PT.      Objective: See treatment diary below      Assessment: Continues to lack hip extension ROM and lunge depth.  Patient performs charted exercises without complications.  Decreased hamstring flexibility remains.  Pt would benefit from continued PT in order to improve hip ROM.      Plan: Continue per plan of care.      POC expires Unit limit Auth Expiration date PT/OT/ST + Visit Limit?   3/4/24 3/day  [facility]                           Visit/Unit Tracking  AUTH Status:  Date 1/22 1/25 2/1 2/5 2/8 2/12 2/15        Approved - 18 visits Used 1 2 3 4 5 6 7         Remaining  17 16 15 14 13 12 13              Precautions: Type II DM, sciatica, h/o L foot drop       Date 2/12 2/15 2/1 2/5 2/8   FOTO        Re-Eval        Manuals        L Hip - Hams, Piriformis, hip flexor   L hip flexor stretch - AF     Psoas release        P-A hip mob SC, PT  SC, PT SC SC   M-L hip mob        LAD  AF      Neuro Re-Ed         BOSU Lunges        SLS        Tandem Stance        Sidestepping        SLS on foam, ball toss at rebounder        Side plank  Modified 15\"x4 Modified 15\"x4      Long sit SLR over cone x10 2x10      Ther Ex        Pt education  AF      Bike 5' hip ROM 5' hip ROM 5' hip ROM 5' hip ROM 5' hip ROM   Self Hams Stretch w/strap 30\"x3 30\"x3      Self Piriformis Stretch  30\"x3  30\"x3 30\"x3  " "  Prone Quad stretch w/strap, roll under distal thigh   Supine 30\"x3 Supine c strap 30\"x3  \   Bridges PB bridge 3x10 PB 3x10 Frog bridge 2x10 Frog bridge 3x10 Frog bridge 3x10   S/L Hip Abd     4# x20   Clamshells   Rev clam 2# 2x10 Rev clam 3# 3x10 Rev clam 4# x20   Prone Hip Ext   C knee bent 2x10 C knee bent 3x10 C knee bent 4# 2x10   Prone ER iso        TRX Squat    2x10     Deep lunge foot elevated 4\" step 2x10 (HRA) 4\" step 2x10 (HRA)      KB hip flex 5# x15       Half kneel TG hip flexor stretch  10\"x10      Ther Activity        Stepups F/L        Stepdowns        Gait Training                        Modalities                                          "

## 2024-02-19 ENCOUNTER — OFFICE VISIT (OUTPATIENT)
Dept: PHYSICAL THERAPY | Facility: CLINIC | Age: 64
End: 2024-02-19
Payer: COMMERCIAL

## 2024-02-19 DIAGNOSIS — M62.9 HAMSTRING TIGHTNESS OF BOTH LOWER EXTREMITIES: ICD-10-CM

## 2024-02-19 DIAGNOSIS — S76.012D HIP STRAIN, LEFT, SUBSEQUENT ENCOUNTER: ICD-10-CM

## 2024-02-19 DIAGNOSIS — M16.12 ARTHRITIS OF LEFT HIP: Primary | ICD-10-CM

## 2024-02-19 PROCEDURE — 97110 THERAPEUTIC EXERCISES: CPT

## 2024-02-19 NOTE — PROGRESS NOTES
"Daily Note     Today's date: 2024  Patient name: Rory Tsai  : 1960  MRN: [id number]  Referring provider: Krystal Coleman*  Dx:   Encounter Diagnosis     ICD-10-CM    1. Arthritis of left hip  M16.12       2. Hamstring tightness of both lower extremities  M62.9       3. 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      Objective: See treatment diary below      Assessment: PT educated pt on the importance of performing HEP regularly at home in order to see maximum benefit of skilled physical therapy, pt verbalized understanding. PT notes improved hip mobility in comparison to previous sessions. Pt is fatigued with hip flexor strengthening. Pt would benefit from continued PT in order to improve hip ROM.      Plan: Continue per plan of care.      POC expires Unit limit Auth Expiration date PT/OT/ST + Visit Limit?   3/4/24 3/day  [facility]                           Visit/Unit Tracking  AUTH Status:  Date 1/22 1/25 2/1 2/5 2/8 2/12 2/15 2/19       Approved - 18 visits Used 1 2 3 4 5 6 7 8        Remaining  17 16 15 14 13 12 11 10             Precautions: Type II DM, sciatica, h/o L foot drop       Date 2/12 2/15 2/19 2/5 2/8   FOTO        Re-Eval        Manuals        L Hip - Hams, Piriformis, hip flexor        Psoas release        P-A hip mob SC, PT  SC  SC SC   M-L hip mob        LAD  AF      Neuro Re-Ed         BOSU Lunges        SLS        Tandem Stance        Sidestepping        SLS on foam, ball toss at rebounder        Side plank  Modified 15\"x4 Modified 15\"x4      Long sit SLR over cone x10 2x10 2x10 2#      Ther Ex        Pt education  AF      Bike 5' hip ROM 5' hip ROM 5' hip ROM 5' hip ROM 5' hip ROM   Self Hams Stretch w/strap 30\"x3 30\"x3  " "    Self Piriformis Stretch  30\"x3  30\"x3 30\"x3    Prone Quad stretch w/strap, roll under distal thigh   Supine c strap 30\"x4 Supine c strap 30\"x3  \   Bridges PB bridge 3x10 PB 3x10  Frog bridge 3x10 Frog bridge 3x10   S/L Hip Abd     4# x20   Clamshells    Rev clam 3# 3x10 Rev clam 4# x20   Prone Hip Ext    C knee bent 3x10 C knee bent 4# 2x10   Prone ER iso        TRX Squat    2x10     Deep lunge foot elevated 4\" step 2x10 (HRA) 4\" step 2x10 (HRA)      KB hip flex 5# x15       Half kneel TG hip flexor stretch  10\"x10 30\"x3     Hip flexion   RTB 2x12     SLR    3x10                     Ther Activity        Stepups F/L        Stepdowns        Gait Training                        Modalities                                          "

## 2024-02-22 ENCOUNTER — OFFICE VISIT (OUTPATIENT)
Dept: PHYSICAL THERAPY | Facility: CLINIC | Age: 64
End: 2024-02-22
Payer: COMMERCIAL

## 2024-02-22 DIAGNOSIS — M16.12 ARTHRITIS OF LEFT HIP: Primary | ICD-10-CM

## 2024-02-22 DIAGNOSIS — M62.9 HAMSTRING TIGHTNESS OF BOTH LOWER EXTREMITIES: ICD-10-CM

## 2024-02-22 DIAGNOSIS — S76.012D HIP STRAIN, LEFT, SUBSEQUENT ENCOUNTER: ICD-10-CM

## 2024-02-22 PROCEDURE — 97110 THERAPEUTIC EXERCISES: CPT

## 2024-02-22 NOTE — PROGRESS NOTES
"Daily Note     Today's date: 2024  Patient name: Rory Tsai  : 1960  MRN: [id number]  Referring provider: Krystal Coleman*  Dx:   Encounter Diagnosis     ICD-10-CM    1. Arthritis of left hip  M16.12       2. Hamstring tightness of both lower extremities  M62.9       3. Hip strain, left, subsequent encounter  S76.012D           Start Time: 1716  Stop Time: 1759  Total time in clinic (min): 43 minutes    Subjective: Pt reports that he is doing well, hasn't really had much pain.       Objective: See treatment diary below      Assessment: Pt is progressing well. Pt is fatigued with hip flexor strengthening post session. Pt would benefit from continued PT in order to improve hip ROM and strength.      Plan: Continue per plan of care.      POC expires Unit limit Auth Expiration date PT/OT/ST + Visit Limit?   3/4/24 3/day  [facility]                           Visit/Unit Tracking  AUTH Status:  Date 1/22 1/25 2/1 2/5 2/8 2/12 2/15 2/19 2/22      Approved - 18 visits Used 1 2 3 4 5 6 7 8 9       Remaining  17 16 15 14 13 12 11 10 9            Precautions: Type II DM, sciatica, h/o L foot drop       Date 2/12 2/15 2/19 2/22 2/8   FOTO        Re-Eval        Manuals        L Hip - Hams, Piriformis, hip flexor        Psoas release        P-A hip mob SC, PT  SC   SC   M-L hip mob        LAD  AF      Neuro Re-Ed         BOSU Lunges        SLS        Tandem Stance        Sidestepping        SLS on foam, ball toss at rebounder        Side plank  Modified 15\"x4 Modified 15\"x4      Long sit SLR over cone x10 2x10 2x10 2#  3x10 2# 2 cones     Ther Ex        Pt education  AF      Bike 5' hip ROM 5' hip ROM 5' hip ROM 6' hip ROM 5' hip ROM   Self Hams Stretch w/strap 30\"x3 30\"x3      Self Piriformis Stretch  30\"x3   30\"x3    Prone Quad stretch w/strap, roll under distal thigh   Supine c strap 30\"x4 Supine c strap 30\"x3    Bridges PB bridge 3x10 PB 3x10   Frog bridge 3x10   S/L Hip Abd     4# x20   Clamshells     Rev " "clam 4# x20   Prone Hip Ext     C knee bent 4# 2x10   Prone ER iso        TRX Squat         Deep lunge foot elevated 4\" step 2x10 (HRA) 4\" step 2x10 (HRA)  8\" step 2x10 (HRA)    KB hip flex 5# x15       Half kneel TG hip flexor stretch  10\"x10 30\"x3     Hip flexion   RTB 2x12 RTB 3x12    SLR    3x10 3x12     Hip adductor stretch    Sit 30\"x3            Ther Activity        Stepups F/L        Stepdowns        Gait Training                        Modalities                                          "

## 2024-02-26 ENCOUNTER — APPOINTMENT (OUTPATIENT)
Dept: PHYSICAL THERAPY | Facility: CLINIC | Age: 64
End: 2024-02-26
Payer: COMMERCIAL

## 2024-02-29 ENCOUNTER — APPOINTMENT (OUTPATIENT)
Dept: PHYSICAL THERAPY | Facility: CLINIC | Age: 64
End: 2024-02-29
Payer: COMMERCIAL

## 2024-03-01 ENCOUNTER — APPOINTMENT (OUTPATIENT)
Dept: LAB | Facility: HOSPITAL | Age: 64
End: 2024-03-01
Payer: COMMERCIAL

## 2024-03-01 DIAGNOSIS — E78.2 MIXED HYPERLIPIDEMIA: ICD-10-CM

## 2024-03-01 DIAGNOSIS — E11.9 TYPE 2 DIABETES MELLITUS WITHOUT COMPLICATION, WITHOUT LONG-TERM CURRENT USE OF INSULIN (HCC): ICD-10-CM

## 2024-03-01 LAB
ALBUMIN SERPL BCP-MCNC: 4.2 G/DL (ref 3.5–5)
ALP SERPL-CCNC: 71 U/L (ref 34–104)
ALT SERPL W P-5'-P-CCNC: 31 U/L (ref 7–52)
ANION GAP SERPL CALCULATED.3IONS-SCNC: 6 MMOL/L
AST SERPL W P-5'-P-CCNC: 23 U/L (ref 13–39)
BILIRUB SERPL-MCNC: 0.59 MG/DL (ref 0.2–1)
BUN SERPL-MCNC: 18 MG/DL (ref 5–25)
CALCIUM SERPL-MCNC: 9.8 MG/DL (ref 8.4–10.2)
CHLORIDE SERPL-SCNC: 103 MMOL/L (ref 96–108)
CHOLEST SERPL-MCNC: 199 MG/DL
CO2 SERPL-SCNC: 29 MMOL/L (ref 21–32)
CREAT SERPL-MCNC: 1.28 MG/DL (ref 0.6–1.3)
EST. AVERAGE GLUCOSE BLD GHB EST-MCNC: 174 MG/DL
GFR SERPL CREATININE-BSD FRML MDRD: 59 ML/MIN/1.73SQ M
GLUCOSE P FAST SERPL-MCNC: 226 MG/DL (ref 65–99)
HBA1C MFR BLD: 7.7 %
HDLC SERPL-MCNC: 54 MG/DL
LDLC SERPL CALC-MCNC: 125 MG/DL (ref 0–100)
POTASSIUM SERPL-SCNC: 4.2 MMOL/L (ref 3.5–5.3)
PROT SERPL-MCNC: 6.9 G/DL (ref 6.4–8.4)
SODIUM SERPL-SCNC: 138 MMOL/L (ref 135–147)
TRIGL SERPL-MCNC: 99 MG/DL

## 2024-03-01 PROCEDURE — 80053 COMPREHEN METABOLIC PANEL: CPT

## 2024-03-01 PROCEDURE — 36415 COLL VENOUS BLD VENIPUNCTURE: CPT

## 2024-03-01 PROCEDURE — 80061 LIPID PANEL: CPT

## 2024-03-01 PROCEDURE — 83036 HEMOGLOBIN GLYCOSYLATED A1C: CPT

## 2024-03-13 ENCOUNTER — OFFICE VISIT (OUTPATIENT)
Dept: FAMILY MEDICINE CLINIC | Facility: CLINIC | Age: 64
End: 2024-03-13
Payer: COMMERCIAL

## 2024-03-13 VITALS
HEART RATE: 96 BPM | WEIGHT: 191.4 LBS | SYSTOLIC BLOOD PRESSURE: 140 MMHG | RESPIRATION RATE: 12 BRPM | DIASTOLIC BLOOD PRESSURE: 80 MMHG | OXYGEN SATURATION: 97 % | HEIGHT: 70 IN | BODY MASS INDEX: 27.4 KG/M2

## 2024-03-13 DIAGNOSIS — R09.89 CHEST CONGESTION: ICD-10-CM

## 2024-03-13 DIAGNOSIS — E78.2 MIXED HYPERLIPIDEMIA: ICD-10-CM

## 2024-03-13 DIAGNOSIS — E11.9 TYPE 2 DIABETES MELLITUS WITHOUT COMPLICATION, WITHOUT LONG-TERM CURRENT USE OF INSULIN (HCC): Primary | ICD-10-CM

## 2024-03-13 PROCEDURE — 99214 OFFICE O/P EST MOD 30 MIN: CPT | Performed by: NURSE PRACTITIONER

## 2024-03-13 RX ORDER — GLIMEPIRIDE 4 MG/1
4 TABLET ORAL
Qty: 90 TABLET | Refills: 0 | Status: SHIPPED | OUTPATIENT
Start: 2024-03-13

## 2024-03-13 RX ORDER — ALBUTEROL SULFATE 90 UG/1
2 AEROSOL, METERED RESPIRATORY (INHALATION) EVERY 6 HOURS PRN
Qty: 18 G | Refills: 0 | Status: SHIPPED | OUTPATIENT
Start: 2024-03-13

## 2024-03-13 NOTE — PROGRESS NOTES
Name: Rory Tsai      : 1960      MRN: [id number]  Encounter Provider: YESENIA Elmore  Encounter Date: 3/13/2024   Encounter department: [facility] [address]    Assessment & Plan     1. Type 2 diabetes mellitus without complication, without long-term current use of insulin (HCC)  Assessment & Plan:  Recent hemoglobin A1c not within goal.  Patient notes not adhering to diabetic diet.  Discussed importance of limiting beer, sugars, carbohydrates.  At this time, will increase glimepiride from 2 mg to 4 mg.  To obtain repeat blood work in 3 months.    Lab Results   Component Value Date    HGBA1C 7.7 (H) 2024       Orders:  -     glimepiride (AMARYL) 4 mg tablet; Take 1 tablet (4 mg total) by mouth daily with breakfast  -     Comprehensive metabolic panel; Future; Expected date: 2024  -     Hemoglobin A1C; Future; Expected date: 2024    2. Mixed hyperlipidemia  Assessment & Plan:  Patient states he has been off Lipitor for over 2 months after experiencing upset stomach and fatigue.  Recent lipid panel reviewed with patient.  Advised patient to restart Lipitor as previously prescribed.  If review of symptoms returned, discontinue medication and call office for further evaluation/management.  To obtain repeat blood work as ordered.    Orders:  -     Comprehensive metabolic panel; Future; Expected date: 2024  -     Lipid Panel with Direct LDL reflex; Future; Expected date: 2024    3. Chest congestion  Comments:  Discussed importance of limiting irritants like dust, saline rinses twice daily, steam, humidified/cool air, Mucinex as directed, albuterol as needed  Orders:  -     albuterol (ProAir HFA) 90 mcg/act inhaler; Inhale 2 puffs every 6 (six) hours as needed for wheezing or shortness of breath           Subjective      Patient presents to the office for 3-month follow-up.  As per patient, has had increase in  Taking diabetic medication as  prescribed.  Stopped statin few months ago after having an episode of abdominal discomfort and fatigue.    Patient with complaints of chest congestion.  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  Took course of Augmentin that was previously prescribed to him that he had in his home.  States symptoms for the most part resolved, but continues with postnasal drip and increased mucus production.   Notes tightness in chest with increased mucus, but states when he expectorates it, symptoms are improved.   Patient denies cough, fever, chills, wheezing.      Review of Systems   Constitutional:  Negative for activity change, appetite change, chills and fever.   HENT:  Positive for congestion and postnasal drip. Negative for ear pain, rhinorrhea, sinus pressure, sinus pain, sore throat and trouble swallowing.    Eyes:  Negative for photophobia and visual disturbance.   Respiratory:  Positive for chest tightness. Negative for shortness of breath and wheezing.    Cardiovascular:  Negative for chest pain and palpitations.   Gastrointestinal:  Negative for nausea and vomiting.   Endocrine: Negative for polydipsia, polyphagia and polyuria.   Genitourinary:  Negative for decreased urine volume.   Musculoskeletal:  Negative for arthralgias and myalgias.   Skin:  Negative for color change and rash.   Neurological:  Negative for dizziness, weakness, light-headedness, numbness and headaches.   Hematological:  Negative for adenopathy.   Psychiatric/Behavioral:  Negative for confusion and dysphoric mood.        Current Outpatient Medications on File Prior to Visit   Medication Sig    gabapentin (NEURONTIN) 300 mg capsule Take 1 capsule (300 mg total) by mouth 3 (three) times a day    glucose blood (OneTouch Verio) test strip Check blood sugars once daily. Please substitute with appropriate alternative as covered by patient's insurance. Dx:  "E11.65    hydroCHLOROthiazide 12.5 mg tablet TAKE ONE TABLET BY MOUTH EVERY DAY    lisinopril (ZESTRIL) 40 mg tablet TAKE ONE TABLET BY MOUTH EVERY DAY    meloxicam (Mobic) 15 mg tablet Take 1 tablet (15 mg total) by mouth daily    OneTouch Delica Lancets 33G MISC Check blood sugars once daily. Please substitute with appropriate alternative as covered by patient's insurance. Dx: E11.65    [DISCONTINUED] glimepiride (AMARYL) 2 mg tablet TAKE ONE TABLET BY MOUTH EVERY DAY WITH BREAKFAST    atorvastatin (LIPITOR) 20 mg tablet Take 1 tablet (20 mg total) by mouth every evening (Patient not taking: Reported on 3/13/2024)    Blood Glucose Monitoring Suppl (OneTouch Verio Reflect) w/Device KIT Check blood sugars once daily. Please substitute with appropriate alternative as covered by patient's insurance. Dx: E11.65       Objective     /80   Pulse 96   Resp 12   Ht 5' 10\" (1.778 m)   Wt 86.8 kg (191 lb 6.4 oz)   SpO2 97%   BMI 27.46 kg/m²     Physical Exam  Vitals reviewed.   Constitutional:       General: He is not in acute distress.     Appearance: Normal appearance. He is not ill-appearing.   HENT:      Head: Normocephalic and atraumatic.      Right Ear: Tympanic membrane, ear canal and external ear normal.      Left Ear: Tympanic membrane, ear canal and external ear normal.      Nose: Congestion present.      Right Turbinates: Not enlarged.      Left Turbinates: Not enlarged.      Right Sinus: No maxillary sinus tenderness or frontal sinus tenderness.      Left Sinus: No maxillary sinus tenderness or frontal sinus tenderness.      Mouth/Throat:      Mouth: Mucous membranes are moist.      Pharynx: Oropharynx is clear.   Eyes:      Conjunctiva/sclera: Conjunctivae normal.      Pupils: Pupils are equal, round, and reactive to light.   Cardiovascular:      Rate and Rhythm: Normal rate and regular rhythm.      Pulses: Normal pulses.      Heart sounds: Normal heart sounds. No murmur heard.  Pulmonary:      " Effort: Pulmonary effort is normal.      Breath sounds: Normal breath sounds. No wheezing.   Abdominal:      General: Bowel sounds are normal.      Palpations: Abdomen is soft.      Tenderness: There is no abdominal tenderness.   Musculoskeletal:         General: Normal range of motion.      Cervical back: Normal range of motion and neck supple.      Right lower leg: No edema.      Left lower leg: No edema.   Lymphadenopathy:      Cervical: No cervical adenopathy.   Skin:     General: Skin is warm and dry.      Capillary Refill: Capillary refill takes less than 2 seconds.   Neurological:      General: No focal deficit present.      Mental Status: He is alert and oriented to person, place, and time.   Psychiatric:         Mood and Affect: Mood normal.         Behavior: Behavior normal.       YESENIA Elmore

## 2024-03-13 NOTE — ASSESSMENT & PLAN NOTE
Patient states he has been off Lipitor for over 2 months after experiencing upset stomach and fatigue.  Recent lipid panel reviewed with patient.  Advised patient to restart Lipitor as previously prescribed.  If review of symptoms returned, discontinue medication and call office for further evaluation/management.  To obtain repeat blood work as ordered.

## 2024-03-13 NOTE — ASSESSMENT & PLAN NOTE
Recent hemoglobin A1c not within goal.  Patient notes not adhering to diabetic diet.  Discussed importance of limiting beer, sugars, carbohydrates.  At this time, will increase glimepiride from 2 mg to 4 mg.  To obtain repeat blood work in 3 months.    Lab Results   Component Value Date    HGBA1C 7.7 (H) 03/01/2024

## 2024-06-06 ENCOUNTER — APPOINTMENT (OUTPATIENT)
Dept: LAB | Facility: HOSPITAL | Age: 64
End: 2024-06-06
Payer: COMMERCIAL

## 2024-06-06 DIAGNOSIS — E78.2 MIXED HYPERLIPIDEMIA: ICD-10-CM

## 2024-06-06 DIAGNOSIS — E11.9 TYPE 2 DIABETES MELLITUS WITHOUT COMPLICATION, WITHOUT LONG-TERM CURRENT USE OF INSULIN (HCC): ICD-10-CM

## 2024-06-06 LAB
ALBUMIN SERPL BCP-MCNC: 4 G/DL (ref 3.5–5)
ALP SERPL-CCNC: 69 U/L (ref 34–104)
ALT SERPL W P-5'-P-CCNC: 26 U/L (ref 7–52)
ANION GAP SERPL CALCULATED.3IONS-SCNC: 5 MMOL/L (ref 4–13)
AST SERPL W P-5'-P-CCNC: 23 U/L (ref 13–39)
BILIRUB SERPL-MCNC: 0.61 MG/DL (ref 0.2–1)
BUN SERPL-MCNC: 19 MG/DL (ref 5–25)
CALCIUM SERPL-MCNC: 9.6 MG/DL (ref 8.4–10.2)
CHLORIDE SERPL-SCNC: 103 MMOL/L (ref 96–108)
CHOLEST SERPL-MCNC: 195 MG/DL
CO2 SERPL-SCNC: 29 MMOL/L (ref 21–32)
CREAT SERPL-MCNC: 1.3 MG/DL (ref 0.6–1.3)
GFR SERPL CREATININE-BSD FRML MDRD: 57 ML/MIN/1.73SQ M
GLUCOSE P FAST SERPL-MCNC: 191 MG/DL (ref 65–99)
HDLC SERPL-MCNC: 60 MG/DL
LDLC SERPL CALC-MCNC: 121 MG/DL (ref 0–100)
POTASSIUM SERPL-SCNC: 4.4 MMOL/L (ref 3.5–5.3)
PROT SERPL-MCNC: 6.6 G/DL (ref 6.4–8.4)
SODIUM SERPL-SCNC: 137 MMOL/L (ref 135–147)
TRIGL SERPL-MCNC: 70 MG/DL

## 2024-06-06 PROCEDURE — 80061 LIPID PANEL: CPT

## 2024-06-06 PROCEDURE — 83036 HEMOGLOBIN GLYCOSYLATED A1C: CPT

## 2024-06-06 PROCEDURE — 36415 COLL VENOUS BLD VENIPUNCTURE: CPT

## 2024-06-06 PROCEDURE — 80053 COMPREHEN METABOLIC PANEL: CPT

## 2024-06-07 LAB
EST. AVERAGE GLUCOSE BLD GHB EST-MCNC: 154 MG/DL
HBA1C MFR BLD: 7 %

## 2024-06-12 ENCOUNTER — OFFICE VISIT (OUTPATIENT)
Dept: FAMILY MEDICINE CLINIC | Facility: CLINIC | Age: 64
End: 2024-06-12
Payer: COMMERCIAL

## 2024-06-12 VITALS
SYSTOLIC BLOOD PRESSURE: 142 MMHG | DIASTOLIC BLOOD PRESSURE: 86 MMHG | HEART RATE: 81 BPM | WEIGHT: 193.6 LBS | BODY MASS INDEX: 27.72 KG/M2 | HEIGHT: 70 IN | TEMPERATURE: 97.6 F | OXYGEN SATURATION: 98 %

## 2024-06-12 DIAGNOSIS — E11.9 TYPE 2 DIABETES MELLITUS WITHOUT COMPLICATION, WITHOUT LONG-TERM CURRENT USE OF INSULIN (HCC): Primary | ICD-10-CM

## 2024-06-12 DIAGNOSIS — I10 PRIMARY HYPERTENSION: ICD-10-CM

## 2024-06-12 PROCEDURE — 99214 OFFICE O/P EST MOD 30 MIN: CPT | Performed by: NURSE PRACTITIONER

## 2024-06-12 RX ORDER — AMLODIPINE BESYLATE 2.5 MG/1
2.5 TABLET ORAL DAILY
Qty: 90 TABLET | Refills: 0 | Status: SHIPPED | OUTPATIENT
Start: 2024-06-12

## 2024-06-12 NOTE — ASSESSMENT & PLAN NOTE
Current hemoglobin A1c improved from previous reading.  Within goal.  Will keep on current dose of glimepiride.  Encourage to restart diabetic diet.  Discussed importance of limiting carbohydrates and added sugars.  Will repeat blood work in 3 months.    Lab Results   Component Value Date    HGBA1C 7.0 (H) 06/06/2024

## 2024-06-12 NOTE — ASSESSMENT & PLAN NOTE
Systolic blood pressure slightly elevated during visit.  Per patient, blood pressures at home have been 130s to 140 over 70s to 80.  Recent CMP reviewed with patient, decrease in GFR noted.  At this time, will discontinue hydrochlorothiazide.  To be started on amlodipine 2.5 mg.  Discussed importance of blood pressure management, increase hydration, blood sugar control.  Will repeat CMP in 3 months.  Advised to return to office in 2 to 4 weeks for blood pressure check.

## 2024-06-12 NOTE — PROGRESS NOTES
Ambulatory Visit  Name: Rory Tsai      : 1960      MRN: [id number]  Encounter Provider: YESENIA Elmore  Encounter Date: 2024   Encounter department: [facility] [address]    Assessment & Plan   1. Type 2 diabetes mellitus without complication, without long-term current use of insulin (HCC)  Assessment & Plan:  Current hemoglobin A1c improved from previous reading.  Within goal.  Will keep on current dose of glimepiride.  Encourage to restart diabetic diet.  Discussed importance of limiting carbohydrates and added sugars.  Will repeat blood work in 3 months.    Lab Results   Component Value Date    HGBA1C 7.0 (H) 2024     Orders:  -     Hemoglobin A1C; Future; Expected date: 2024  -     Comprehensive metabolic panel; Future; Expected date: 2024  2. Primary hypertension  Assessment & Plan:  Systolic blood pressure slightly elevated during visit.  Per patient, blood pressures at home have been 130s to 140 over 70s to 80.  Recent CMP reviewed with patient, decrease in GFR noted.  At this time, will discontinue hydrochlorothiazide.  To be started on amlodipine 2.5 mg.  Discussed importance of blood pressure management, increase hydration, blood sugar control.  Will repeat CMP in 3 months.  Advised to return to office in 2 to 4 weeks for blood pressure check.  Orders:  -     amLODIPine (NORVASC) 2.5 mg tablet; Take 1 tablet (2.5 mg total) by mouth daily  -     Comprehensive metabolic panel; Future; Expected date: 2024       History of Present Illness     Patient presents office for 3-month follow-up.  Patient does not monitor blood sugar at home, but has been monitoring blood pressure.  Per patient, blood pressures have ranged 130s to 140 over 70s to 80.  Denies symptoms related to elevated blood sugar or blood pressure.  Patient remains active, but states he has not been on his normal diet over the past few weeks.  Is engaging in increased  carbohydrate and caloric intake.  Denies headaches, dizziness, weakness, chest pain, shortness of breath.        Review of Systems   Constitutional:  Negative for activity change, appetite change and fatigue.   HENT:  Negative for sore throat and trouble swallowing.    Eyes:  Negative for photophobia and visual disturbance.   Respiratory:  Negative for cough, chest tightness and shortness of breath.    Cardiovascular:  Negative for chest pain and palpitations.   Gastrointestinal:  Negative for nausea and vomiting.   Endocrine: Negative for polydipsia, polyphagia and polyuria.   Genitourinary:  Negative for decreased urine volume, flank pain and hematuria.   Musculoskeletal:  Negative for arthralgias and myalgias.   Skin:  Negative for color change and rash.   Neurological:  Negative for dizziness, weakness, light-headedness, numbness and headaches.   Hematological:  Negative for adenopathy.   Psychiatric/Behavioral:  Negative for confusion.      Pertinent Medical History     Medical History Reviewed by provider this encounter:  Tobacco  Allergies  Meds  Problems  Med Hx  Surg Hx  Fam Hx  Soc   Hx      Past Medical History   Past Medical History:   Diagnosis Date    Colon polyp     Diabetes mellitus (HCC)     Hyperlipidemia     Sciatica     Type 2 diabetes mellitus (HCC)      Past Surgical History:   Procedure Laterality Date    COLONOSCOPY       Family History   Problem Relation Age of Onset    Diabetes Mother     Alzheimer's disease Mother     Dementia Father     Prostate cancer Father     Hypertension Father     Hyperlipidemia Father      Current Outpatient Medications on File Prior to Visit   Medication Sig Dispense Refill    atorvastatin (LIPITOR) 20 mg tablet Take 1 tablet (20 mg total) by mouth every evening 90 tablet 1    Blood Glucose Monitoring Suppl (OneTouch Verio Reflect) w/Device KIT Check blood sugars once daily. Please substitute with appropriate alternative as covered by patient's insurance.  Dx: E11.65 1 kit 0    gabapentin (NEURONTIN) 300 mg capsule Take 1 capsule (300 mg total) by mouth 3 (three) times a day 270 capsule 1    glimepiride (AMARYL) 4 mg tablet Take 1 tablet (4 mg total) by mouth daily with breakfast 90 tablet 0    glucose blood (OneTouch Verio) test strip Check blood sugars once daily. Please substitute with appropriate alternative as covered by patient's insurance. Dx: E11.65 100 each 3    lisinopril (ZESTRIL) 40 mg tablet TAKE ONE TABLET BY MOUTH EVERY DAY 90 tablet 1    meloxicam (Mobic) 15 mg tablet Take 1 tablet (15 mg total) by mouth daily 30 tablet 1    OneTouch Delica Lancets 33G MISC Check blood sugars once daily. Please substitute with appropriate alternative as covered by patient's insurance. Dx: E11.65 100 each 3    [DISCONTINUED] albuterol (ProAir HFA) 90 mcg/act inhaler Inhale 2 puffs every 6 (six) hours as needed for wheezing or shortness of breath 18 g 0    [DISCONTINUED] hydroCHLOROthiazide 12.5 mg tablet TAKE ONE TABLET BY MOUTH EVERY DAY 90 tablet 1     No current facility-administered medications on file prior to visit.   No Known Allergies   Current Outpatient Medications on File Prior to Visit   Medication Sig Dispense Refill    atorvastatin (LIPITOR) 20 mg tablet Take 1 tablet (20 mg total) by mouth every evening 90 tablet 1    Blood Glucose Monitoring Suppl (OneTouch Verio Reflect) w/Device KIT Check blood sugars once daily. Please substitute with appropriate alternative as covered by patient's insurance. Dx: E11.65 1 kit 0    gabapentin (NEURONTIN) 300 mg capsule Take 1 capsule (300 mg total) by mouth 3 (three) times a day 270 capsule 1    glimepiride (AMARYL) 4 mg tablet Take 1 tablet (4 mg total) by mouth daily with breakfast 90 tablet 0    glucose blood (OneTouch Verio) test strip Check blood sugars once daily. Please substitute with appropriate alternative as covered by patient's insurance. Dx: E11.65 100 each 3    lisinopril (ZESTRIL) 40 mg tablet TAKE ONE  "TABLET BY MOUTH EVERY DAY 90 tablet 1    meloxicam (Mobic) 15 mg tablet Take 1 tablet (15 mg total) by mouth daily 30 tablet 1    OneTouch Delica Lancets 33G MISC Check blood sugars once daily. Please substitute with appropriate alternative as covered by patient's insurance. Dx: E11.65 100 each 3    [DISCONTINUED] albuterol (ProAir HFA) 90 mcg/act inhaler Inhale 2 puffs every 6 (six) hours as needed for wheezing or shortness of breath 18 g 0    [DISCONTINUED] hydroCHLOROthiazide 12.5 mg tablet TAKE ONE TABLET BY MOUTH EVERY DAY 90 tablet 1     No current facility-administered medications on file prior to visit.      Social History     Tobacco Use    Smoking status: Some Days     Types: Cigars    Smokeless tobacco: Never    Tobacco comments:     Occasionally   Vaping Use    Vaping status: Never Used   Substance and Sexual Activity    Alcohol use: Yes     Comment: few times a week    Drug use: Not Currently    Sexual activity: Not on file     Objective     /86 (BP Location: Left arm, Patient Position: Sitting)   Pulse 81   Temp 97.6 °F (36.4 °C)   Ht 5' 10\" (1.778 m)   Wt 87.8 kg (193 lb 9.6 oz)   SpO2 98%   BMI 27.78 kg/m²     Physical Exam  Vitals reviewed.   Constitutional:       General: He is not in acute distress.     Appearance: Normal appearance. He is not ill-appearing.   HENT:      Head: Normocephalic and atraumatic.      Right Ear: Tympanic membrane, ear canal and external ear normal.      Left Ear: Tympanic membrane, ear canal and external ear normal.      Nose: Nose normal.      Mouth/Throat:      Mouth: Mucous membranes are moist.      Pharynx: Oropharynx is clear.   Eyes:      Conjunctiva/sclera: Conjunctivae normal.      Pupils: Pupils are equal, round, and reactive to light.   Neck:      Vascular: No carotid bruit.   Cardiovascular:      Rate and Rhythm: Normal rate and regular rhythm.      Pulses: Normal pulses.      Heart sounds: Normal heart sounds. No murmur heard.  Pulmonary:      " Effort: Pulmonary effort is normal.      Breath sounds: Normal breath sounds.   Musculoskeletal:         General: Normal range of motion.      Cervical back: Normal range of motion and neck supple.      Right lower leg: No edema.      Left lower leg: No edema.   Skin:     General: Skin is warm and dry.   Neurological:      General: No focal deficit present.      Mental Status: He is alert and oriented to person, place, and time.   Psychiatric:         Mood and Affect: Mood normal.         Behavior: Behavior normal.

## 2024-07-08 ENCOUNTER — CLINICAL SUPPORT (OUTPATIENT)
Dept: FAMILY MEDICINE CLINIC | Facility: CLINIC | Age: 64
End: 2024-07-08

## 2024-07-08 VITALS — SYSTOLIC BLOOD PRESSURE: 138 MMHG | DIASTOLIC BLOOD PRESSURE: 90 MMHG

## 2024-07-08 DIAGNOSIS — I10 PRIMARY HYPERTENSION: Primary | ICD-10-CM

## 2024-07-08 NOTE — PROGRESS NOTES
Patient blood pressure was 140/88 on left arm and 138/90 on right arm. Spoke to Radha and she wants patient to take 5mg of the amlodipine and come back to re-check his BP in 1-2 weeks. His goal should be around 130//80 and to check his BP at home every other day. Patient understood and will take the recommendation

## 2024-07-26 DIAGNOSIS — E11.9 TYPE 2 DIABETES MELLITUS WITHOUT COMPLICATION, WITHOUT LONG-TERM CURRENT USE OF INSULIN (HCC): ICD-10-CM

## 2024-07-26 RX ORDER — GLIMEPIRIDE 4 MG/1
TABLET ORAL
Qty: 100 TABLET | Refills: 0 | Status: SHIPPED | OUTPATIENT
Start: 2024-07-26

## 2024-08-29 ENCOUNTER — OFFICE VISIT (OUTPATIENT)
Age: 64
End: 2024-08-29

## 2024-08-29 VITALS
RESPIRATION RATE: 18 BRPM | SYSTOLIC BLOOD PRESSURE: 126 MMHG | HEART RATE: 93 BPM | TEMPERATURE: 98.2 F | HEIGHT: 70 IN | DIASTOLIC BLOOD PRESSURE: 82 MMHG | WEIGHT: 185 LBS | BODY MASS INDEX: 26.48 KG/M2

## 2024-08-29 DIAGNOSIS — L82.1 SEBORRHEIC KERATOSIS: ICD-10-CM

## 2024-08-29 DIAGNOSIS — D22.9 MULTIPLE MELANOCYTIC NEVI: ICD-10-CM

## 2024-08-29 DIAGNOSIS — D18.01 CHERRY ANGIOMA: ICD-10-CM

## 2024-08-29 DIAGNOSIS — D48.9 NEOPLASM OF UNCERTAIN BEHAVIOR: Primary | ICD-10-CM

## 2024-08-29 PROCEDURE — 88305 TISSUE EXAM BY PATHOLOGIST: CPT | Performed by: STUDENT IN AN ORGANIZED HEALTH CARE EDUCATION/TRAINING PROGRAM

## 2024-08-29 PROCEDURE — 88341 IMHCHEM/IMCYTCHM EA ADD ANTB: CPT | Performed by: STUDENT IN AN ORGANIZED HEALTH CARE EDUCATION/TRAINING PROGRAM

## 2024-08-29 PROCEDURE — 88342 IMHCHEM/IMCYTCHM 1ST ANTB: CPT | Performed by: STUDENT IN AN ORGANIZED HEALTH CARE EDUCATION/TRAINING PROGRAM

## 2024-08-29 NOTE — PROGRESS NOTES
"[facility]atology [facility] Note     Patient Name: Rory Tasi  Encounter Date: 8/29/24     Have you been cared for by a [facility] Dermatologist in the last 3 years and, if so, which description applies to you?    Yes.  I have been here within the last 3 years, and my medical history has NOT changed since that time.  I am MALE/not capable of bearing children.    REVIEW OF SYSTEMS:  Have you recently had or currently have any of the following? No changes in my recent health.   PAST MEDICAL HISTORY:  Have you personally ever had or currently have any of the following?  If \"YES,\" then please provide more detail. No changes in my medical history.   HISTORY OF IMMUNOSUPPRESSION: Do you have a history of any of the following:  Systemic Immunosuppression such as Diabetes, Biologic or Immunotherapy, Chemotherapy, Organ Transplantation, Bone Marrow Transplantation or Prednisone?  YES, Diabetes      Answering \"YES\" requires the addition of the dotphrase \"IMMUNOSUPPRESSED\" as the first diagnosis of the patient's visit.   FAMILY HISTORY:  Any \"first degree relatives\" (parent, brother, sister, or child) with the following?    No changes in my family's known health.   PATIENT EXPERIENCE:    Do you want the Dermatologist to perform a COMPLETE skin exam today including a clinical examination under the \"bra and underwear\" areas? Yes   If necessary, do we have your permission to call and leave a detailed message on your Preferred Phone number that includes your specific medical information?  Yes      No Known Allergies   Current Outpatient Medications:     amLODIPine (NORVASC) 2.5 mg tablet, Take 1 tablet (2.5 mg total) by mouth daily, Disp: 90 tablet, Rfl: 0    atorvastatin (LIPITOR) 20 mg tablet, Take 1 tablet (20 mg total) by mouth every evening, Disp: 90 tablet, Rfl: 1    Blood Glucose Monitoring Suppl (OneTouch Verio Reflect) w/Device KIT, Check blood sugars once daily. Please substitute with appropriate alternative as covered " "by patient's insurance. Dx: E11.65, Disp: 1 kit, Rfl: 0    gabapentin (NEURONTIN) 300 mg capsule, Take 1 capsule (300 mg total) by mouth 3 (three) times a day, Disp: 270 capsule, Rfl: 1    glimepiride (AMARYL) 4 mg tablet, TAKE ONE TABLET BY MOUTH EVERY DAY WITH BREKAFAST, Disp: 100 tablet, Rfl: 0    glucose blood (OneTouch Verio) test strip, Check blood sugars once daily. Please substitute with appropriate alternative as covered by patient's insurance. Dx: E11.65, Disp: 100 each, Rfl: 3    lisinopril (ZESTRIL) 40 mg tablet, TAKE ONE TABLET BY MOUTH EVERY DAY, Disp: 90 tablet, Rfl: 1    meloxicam (Mobic) 15 mg tablet, Take 1 tablet (15 mg total) by mouth daily, Disp: 30 tablet, Rfl: 1    OneTouch Delica Lancets 33G MISC, Check blood sugars once daily. Please substitute with appropriate alternative as covered by patient's insurance. Dx: E11.65, Disp: 100 each, Rfl: 3          Whom besides the patient is providing clinical information about today's encounter?   NO ADDITIONAL HISTORIAN (patient alone provided history)    Physical Exam and Assessment/Plan by Diagnosis:  CHIEF COMPLAINT    64 year old male patient presents today for Yearly Follow Up.  Patient has a No history of skin cancer       MELANOCYTIC NEVI (\"Moles\")    Physical Exam:  Anatomic Location Affected: Mostly on sun-exposed areas of the trunk and extremities   Morphological Description:  Scattered, 1-4mm round to ovoid, symmetrical-appearing, even bordered, skin colored to dark brown macules/papules, mostly in sun-exposed areas  Pertinent Positives:  Pertinent Negatives:    Additional History of Present Condition:  present on exam     Assessment and Plan:  Based on a thorough discussion of this condition and the management approach to it (including a comprehensive discussion of the known risks, side effects and potential benefits of treatment), the patient (family) agrees to implement the following specific plan:  Provided handout with information " "regarding the ABCDE's of moles   Recommend routine skin exams every year as needed    Sun avoidance, protective clothing (known as UPF clothing), and the use of at least SPF 30 sunscreens is advised. Sunscreen should be reapplied every two hours when outside.       SEBORRHEIC KERATOSIS; NON-INFLAMED    Physical Exam:  Anatomic Location Affected:  scattered across trunk, extremities, face  Morphological Description:  Flat and raised, waxy, smooth to warty textured, yellow to brownish-grey to dark brown to blackish, discrete, \"stuck-on\" appearing papules.  Pertinent Positives:  Pertinent Negatives:    Additional History of Present Condition:  Patient reports new bumps on the skin.  Denies itch, burn, pain, bleeding or ulceration.  Present constantly; nothing seems to make it worse or better.  No prior treatment.      Assessment and Plan:  Based on a thorough discussion of this condition and the management approach to it (including a comprehensive discussion of the known risks, side effects and potential benefits of treatment), the patient (family) agrees to implement the following specific plan:  Reassured benign        ANGIOMA (\"CHERRY ANGIOMA\")    Physical Exam:  Anatomic Location: scattered across sun exposed areas of the trunk and extremities   Morphologic Description: Firm red to reddish-blue discrete papules  Pertinent Positives:  Pertinent Negatives:    Additional History of Present Condition:  Present on exam.    Assessment and Plan:  Reassured benign           NEOPLASM OF UNCERTAIN BEHAVIOR OF SKIN    Physical Exam:  (Anatomic Location); (Size and Morphological Description); (Differential Diagnosis):  Specimen A:occipital to the scalp; 5 mm keratotic papule; Diff Dx:inflamed keratosis vs squamous cell carcinoma    Specimen B: right back; 9 mm pigmented papule; Diff Dx: pigmented basal cell r/o melanoma          Pertinent Positives:  Pertinent Negatives:    Additional History of Present Condition:  present on " "exam     Assessment and Plan:  I have discussed with the patient that a sample of skin via a \"skin biopsy” would be potentially helpful to further make a specific diagnosis under the microscope.  Based on a thorough discussion of this condition and the management approach to it (including a comprehensive discussion of the known risks, side effects and potential benefits of treatment), the patient (family) agrees to implement the following specific plan:    Procedure:  Skin Biopsy.  After a thorough discussion of treatment options and risk/benefits/alternatives (including but not limited to local pain, scarring, dyspigmentation, blistering, possible superinfection, and inability to confirm a diagnosis via histopathology), verbal and written consent were obtained and portion of the rash was biopsied for tissue sample.  See below for consent that was obtained from patient and subsequent Procedure Note.     PROCEDURE TANGENTIAL (SHAVE) BIOPSY NOTE:    Performing Physician:   Anatomic Location; Clinical Description with size (cm); Pre-Op Diagnosis:   Specimen A:occipital to the scalp; 5 mm keratotic papule; Diff Dx:inflamed keratosis vs squamous cell carcinoma    Specimen B: right back; 9 mm pigmented papule; Diff Dx: pigmented basal cell r/o melanoma    Post-op diagnosis: Same     Local anesthesia: 3:1 1% xylocaine with epi and 1-100,000 buffered     Topical anesthesia: None    Hemostasis: Aluminum chloride       After obtaining informed consent  at which time there was a discussion about the purpose of biopsy  and low risks of infection and bleeding.  The area was prepped and draped in the usual fashion. Anesthesia was obtained with 1% lidocaine with epinephrine. A shave biopsy to an appropriate sampling depth was obtained by Shave (Dermablade or 15 blade) The resulting wound was covered with surgical ointment and bandaged appropriately.     The patient tolerated the procedure well without complications and " "was without signs of functional compromise.      Specimen has been sent for review by Dermatopathology.    Standard post-procedure care has been explained and has been included in written form within the patient's copy of Informed Consent.    INFORMED CONSENT DISCUSSION AND POST-OPERATIVE INSTRUCTIONS FOR PATIENT    I.  RATIONALE FOR PROCEDURE  I understand that a skin biopsy allows the Dermatologist to test a lesion or rash under the microscope to obtain a diagnosis.  It usually involves numbing the area with numbing medication and removing a small piece of skin; sometimes the area will be closed with sutures. In this specific procedure, sutures are not usually needed.  If any sutures are placed, then they are usually need to be removed in 2 weeks or less.    I understand that my Dermatologist recommends that a skin \"shave\" biopsy be performed today.  A local anesthetic, similar to the kind that a dentist uses when filling a cavity, will be injected with a very small needle into the skin area to be sampled.  The injected skin and tissue underneath \"will go to sleep” and become numb so no pain should be felt afterwards.  An instrument shaped like a tiny \"razor blade\" (shave biopsy instrument) will be used to cut a small piece of tissue and skin from the area so that a sample of tissue can be taken and examined more closely under the microscope.  A slight amount of bleeding will occur, but it will be stopped with direct pressure and a pressure bandage and any other appropriate methods.  I understands that a scar will form where the wound was created.  Surgical ointment will be applied to help protect the wound.  Sutures are not usually needed.    II.  RISKS AND POTENTIAL COMPLICATIONS   I understand the risks and potential complications of a skin biopsy include but are not limited to the following:  Bleeding  Infection  Pain  Scar/keloid  Skin discoloration  Incomplete Removal  Recurrence  Nerve Damage/Numbness/Loss " "of Function  Allergic Reaction to Anesthesia  Biopsies are diagnostic procedures and based on findings additional treatment or evaluation may be required  Loss or destruction of specimen resulting in no additional findings    My Dermatologist has explained to me the nature of the condition, the nature of the procedure, and the benefits to be reasonably expected compared with alternative approaches.  My Dermatologist has discussed the likelihood of major risks or complications of this procedure including the specific risks listed above, such as bleeding, infection, and scarring/keloid.  I understand that a scar is expected after this procedure.  I understand that my physician cannot predict if the scar will form a \"keloid,\" which extends beyond the borders of the wound that is created.  A keloid is a thick, painful, and bumpy scar.  A keloid can be difficult to treat, as it does not always respond well to therapy, which includes injecting cortisone directly into the keloid every few weeks.  While this usually reduces the pain and size of the scar, it does not eliminate it.      I understand that photographs may be taken before and after the procedure.  These will be maintained as part of the medical providers confidential records and may not be made available to me.  I further authorize the medical provider to use the photographs for teaching purposes or to illustrate scientific papers, books, or lectures if in his/her judgment, medical research, education, or science may benefit from its use.    I have had an opportunity to fully inquire about the risks and benefits of this procedure and its alternatives.   I have been given ample time and opportunity to ask questions and to seek a second opinion if I wished to do so.  I acknowledge that there have specifically been no guarantees as to the cosmetic results from the procedure.  I am aware that with any procedure there is always the possibility of an unexpected " "complication.    III. POST-PROCEDURAL CARE (WHAT YOU WILL NEED TO DO \"AFTER THE BIOPSY\" TO OPTIMIZE HEALING)    Keep the area clean and dry.  Try NOT to remove the bandage or get it wet for the first 24 hours.    Gently clean the area and apply surgical ointment (such as Vaseline petrolatum ointment, which is available \"over the counter\" and not a prescription) to the biopsy site for up to 2 weeks straight.  This acts to protect the wound from the outside world.      Sutures are not usually placed in this procedure.  If any sutures were placed, return for suture removal as instructed (generally 1 week for the face, 2 weeks for the body).      Take Acetaminophen (Tylenol) for discomfort, if no contraindications.  Ibuprofen or aspirin could make bleeding worse.    Call our office immediately for signs of infection: fever, chills, increased redness, warmth, tenderness, discomfort/pain, or pus or foul smell coming from the wound.    WHAT TO DO IF THERE IS ANY BLEEDING?  If a small amount of bleeding is noticed, place a clean cloth over the area and apply firm pressure for ten minutes.  Check the wound after 10 minutes of direct pressure.  If bleeding persists, try one more time for an additional 10 minutes of direct pressure on the area.  If the bleeding becomes heavier or does not stop after the second attempt, or if you have any other questions about this procedure, then please call your [facility]'s Dermatologist by calling [phone] (SKIN).     I hereby acknowledge that I have reviewed and verified the site with my Dermatologist and have requested and authorized my Dermatologist to proceed with the procedure.           SEBORRHEIC KERATOSIS; NON-INFLAMED    Physical Exam:  Anatomic Location Affected:  right side of neck   Morphological Description:  Flat and raised, waxy, smooth to warty textured, yellow to brownish-grey to dark brown to blackish, discrete, \"stuck-on\" appearing papules.  Pertinent " "Positives:  Pertinent Negatives:    Additional History of Present Condition:  Patient reports new bumps on the skin.  Denies itch, burn, pain, bleeding or ulceration.  Present constantly; nothing seems to make it worse or better.  No prior treatment.      Assessment and Plan:  Based on a thorough discussion of this condition and the management approach to it (including a comprehensive discussion of the known risks, side effects and potential benefits of treatment), the patient (family) agrees to implement the following specific plan:  Benign-assurance provided  Monitor for any changes     Seborrheic Keratosis  A seborrheic keratosis is a harmless warty spot that appears during adult life as a common sign of skin aging.  Seborrheic keratoses can arise on any area of skin, covered or uncovered, with the usual exception of the palms and soles. They do not arise from mucous membranes. Seborrheic keratoses can have highly variable appearance.      Seborrheic keratoses are extremely common. It has been estimated that over 90% of adults over the age of 60 years have one or more of them. They occur in males and females of all races, typically beginning to erupt in the 30s or 40s. They are uncommon under the age of 20 years.  The precise cause of seborrhoeic keratoses is not known.  Seborrhoeic keratoses are considered degenerative in nature. As time goes by, seborrheic keratoses tend to become more numerous. Some people inherit a tendency to develop a very large number of them; some people may have hundreds of them.    The name \"seborrheic keratosis\" is misleading, because these lesions are not limited to a seborrhoeic distribution (scalp, mid-face, chest, upper back), nor are they formed from sebaceous glands, nor are they associated with sebum -- which is greasy.  Seborrheic keratosis may also be called \"SK,\" \"Seb K,\" \"basal cell papilloma,\" \"senile wart,\" or \"barnacle.\"      Researchers have noted:  Eruptive seborrhoeic " "keratoses can follow sunburn or dermatitis  Skin friction may be the reason they appear in body folds  Viral cause (e.g., human papillomavirus) seems unlikely  Stable and clonal mutations or activation of FRFR3, PIK3CA, CARYN, AKT1 and EGFR genes are found in seborrhoeic keratoses  Seborrhoeic keratosis can arise from solar lentigo  FRFR3 mutations also arise in solar lentigines. These mutations are associated with increased age and location on the head and neck, suggesting a role of ultraviolet radiation in these lesions  Seborrheic keratoses do not harbour tumour suppressor gene mutations  Epidermal growth factor receptor inhibitors, which are used to treat some cancers, often result in an increase in verrucal (warty) keratoses.    There is no easy way to remove multiple lesions on a single occasion.  Unless a specific lesion is \"inflamed\" and is causing pain or stinging/burning or is bleeding, most insurance companies do not authorize treatment.       Scribe Attestation      I,:  Erica Patel MA am acting as a scribe while in the presence of the attending physician.:       I,:  Jacob Billingsley MD personally performed the services described in this documentation    as scribed in my presence.:            "

## 2024-08-29 NOTE — PATIENT INSTRUCTIONS
"MELANOCYTIC NEVI (\"Moles\")      Assessment and Plan:  Based on a thorough discussion of this condition and the management approach to it (including a comprehensive discussion of the known risks, side effects and potential benefits of treatment), the patient (family) agrees to implement the following specific plan:  Provided handout with information regarding the ABCDE's of moles   Recommend routine skin exams every year as needed    Sun avoidance, protective clothing (known as UPF clothing), and the use of at least SPF 30 sunscreens is advised. Sunscreen should be reapplied every two hours when outside.       SEBORRHEIC KERATOSIS; NON-INFLAMED    Assessment and Plan:  Based on a thorough discussion of this condition and the management approach to it (including a comprehensive discussion of the known risks, side effects and potential benefits of treatment), the patient (family) agrees to implement the following specific plan:  Reassured benign        ANGIOMA (\"CHERRY ANGIOMA\")      Assessment and Plan:  Reassured benign           NEOPLASM OF UNCERTAIN BEHAVIOR OF SKIN          Assessment and Plan:  I have discussed with the patient that a sample of skin via a \"skin biopsy” would be potentially helpful to further make a specific diagnosis under the microscope.  Based on a thorough discussion of this condition and the management approach to it (including a comprehensive discussion of the known risks, side effects and potential benefits of treatment), the patient (family) agrees to implement the following specific plan:    Procedure:  Skin Biopsy.  After a thorough discussion of treatment options and risk/benefits/alternatives (including but not limited to local pain, scarring, dyspigmentation, blistering, possible superinfection, and inability to confirm a diagnosis via histopathology), verbal and written consent were obtained and portion of the rash was biopsied for tissue sample.  See below for consent that was " "obtained from patient and subsequent Procedure Note.     PROCEDURE TANGENTIAL (SHAVE) BIOPSY NOTE:    After obtaining informed consent  at which time there was a discussion about the purpose of biopsy  and low risks of infection and bleeding.  The area was prepped and draped in the usual fashion. Anesthesia was obtained with 1% lidocaine with epinephrine. A shave biopsy to an appropriate sampling depth was obtained by Shave (Dermablade or 15 blade) The resulting wound was covered with surgical ointment and bandaged appropriately.     The patient tolerated the procedure well without complications and was without signs of functional compromise.      Specimen has been sent for review by Dermatopathology.    Standard post-procedure care has been explained and has been included in written form within the patient's copy of Informed Consent.    INFORMED CONSENT DISCUSSION AND POST-OPERATIVE INSTRUCTIONS FOR PATIENT    I.  RATIONALE FOR PROCEDURE  I understand that a skin biopsy allows the Dermatologist to test a lesion or rash under the microscope to obtain a diagnosis.  It usually involves numbing the area with numbing medication and removing a small piece of skin; sometimes the area will be closed with sutures. In this specific procedure, sutures are not usually needed.  If any sutures are placed, then they are usually need to be removed in 2 weeks or less.    I understand that my Dermatologist recommends that a skin \"shave\" biopsy be performed today.  A local anesthetic, similar to the kind that a dentist uses when filling a cavity, will be injected with a very small needle into the skin area to be sampled.  The injected skin and tissue underneath \"will go to sleep” and become numb so no pain should be felt afterwards.  An instrument shaped like a tiny \"razor blade\" (shave biopsy instrument) will be used to cut a small piece of tissue and skin from the area so that a sample of tissue can be taken and examined more " "closely under the microscope.  A slight amount of bleeding will occur, but it will be stopped with direct pressure and a pressure bandage and any other appropriate methods.  I understands that a scar will form where the wound was created.  Surgical ointment will be applied to help protect the wound.  Sutures are not usually needed.    II.  RISKS AND POTENTIAL COMPLICATIONS   I understand the risks and potential complications of a skin biopsy include but are not limited to the following:  Bleeding  Infection  Pain  Scar/keloid  Skin discoloration  Incomplete Removal  Recurrence  Nerve Damage/Numbness/Loss of Function  Allergic Reaction to Anesthesia  Biopsies are diagnostic procedures and based on findings additional treatment or evaluation may be required  Loss or destruction of specimen resulting in no additional findings    My Dermatologist has explained to me the nature of the condition, the nature of the procedure, and the benefits to be reasonably expected compared with alternative approaches.  My Dermatologist has discussed the likelihood of major risks or complications of this procedure including the specific risks listed above, such as bleeding, infection, and scarring/keloid.  I understand that a scar is expected after this procedure.  I understand that my physician cannot predict if the scar will form a \"keloid,\" which extends beyond the borders of the wound that is created.  A keloid is a thick, painful, and bumpy scar.  A keloid can be difficult to treat, as it does not always respond well to therapy, which includes injecting cortisone directly into the keloid every few weeks.  While this usually reduces the pain and size of the scar, it does not eliminate it.      I understand that photographs may be taken before and after the procedure.  These will be maintained as part of the medical providers confidential records and may not be made available to me.  I further authorize the medical provider to use " "the photographs for teaching purposes or to illustrate scientific papers, books, or lectures if in his/her judgment, medical research, education, or science may benefit from its use.    I have had an opportunity to fully inquire about the risks and benefits of this procedure and its alternatives.   I have been given ample time and opportunity to ask questions and to seek a second opinion if I wished to do so.  I acknowledge that there have specifically been no guarantees as to the cosmetic results from the procedure.  I am aware that with any procedure there is always the possibility of an unexpected complication.    III. POST-PROCEDURAL CARE (WHAT YOU WILL NEED TO DO \"AFTER THE BIOPSY\" TO OPTIMIZE HEALING)    Keep the area clean and dry.  Try NOT to remove the bandage or get it wet for the first 24 hours.    Gently clean the area and apply surgical ointment (such as Vaseline petrolatum ointment, which is available \"over the counter\" and not a prescription) to the biopsy site for up to 2 weeks straight.  This acts to protect the wound from the outside world.      Sutures are not usually placed in this procedure.  If any sutures were placed, return for suture removal as instructed (generally 1 week for the face, 2 weeks for the body).      Take Acetaminophen (Tylenol) for discomfort, if no contraindications.  Ibuprofen or aspirin could make bleeding worse.    Call our office immediately for signs of infection: fever, chills, increased redness, warmth, tenderness, discomfort/pain, or pus or foul smell coming from the wound.    WHAT TO DO IF THERE IS ANY BLEEDING?  If a small amount of bleeding is noticed, place a clean cloth over the area and apply firm pressure for ten minutes.  Check the wound after 10 minutes of direct pressure.  If bleeding persists, try one more time for an additional 10 minutes of direct pressure on the area.  If the bleeding becomes heavier or does not stop after the second attempt, or if you " "have any other questions about this procedure, then please call your [facility]'s Dermatologist by calling [phone] (SKIN).     I hereby acknowledge that I have reviewed and verified the site with my Dermatologist and have requested and authorized my Dermatologist to proceed with the procedure.           SEBORRHEIC KERATOSIS; NON-INFLAMED    Assessment and Plan:  Based on a thorough discussion of this condition and the management approach to it (including a comprehensive discussion of the known risks, side effects and potential benefits of treatment), the patient (family) agrees to implement the following specific plan:  Benign-assurance provided  Monitor for any changes     Seborrheic Keratosis  A seborrheic keratosis is a harmless warty spot that appears during adult life as a common sign of skin aging.  Seborrheic keratoses can arise on any area of skin, covered or uncovered, with the usual exception of the palms and soles. They do not arise from mucous membranes. Seborrheic keratoses can have highly variable appearance.      Seborrheic keratoses are extremely common. It has been estimated that over 90% of adults over the age of 60 years have one or more of them. They occur in males and females of all races, typically beginning to erupt in the 30s or 40s. They are uncommon under the age of 20 years.  The precise cause of seborrhoeic keratoses is not known.  Seborrhoeic keratoses are considered degenerative in nature. As time goes by, seborrheic keratoses tend to become more numerous. Some people inherit a tendency to develop a very large number of them; some people may have hundreds of them.    The name \"seborrheic keratosis\" is misleading, because these lesions are not limited to a seborrhoeic distribution (scalp, mid-face, chest, upper back), nor are they formed from sebaceous glands, nor are they associated with sebum -- which is greasy.  Seborrheic keratosis may also be called \"SK,\" \"Jc K,\" \"basal cell " "papilloma,\" \"senile wart,\" or \"barnacle.\"      Researchers have noted:  Eruptive seborrhoeic keratoses can follow sunburn or dermatitis  Skin friction may be the reason they appear in body folds  Viral cause (e.g., human papillomavirus) seems unlikely  Stable and clonal mutations or activation of FRFR3, PIK3CA, CARYN, AKT1 and EGFR genes are found in seborrhoeic keratoses  Seborrhoeic keratosis can arise from solar lentigo  FRFR3 mutations also arise in solar lentigines. These mutations are associated with increased age and location on the head and neck, suggesting a role of ultraviolet radiation in these lesions  Seborrheic keratoses do not harbour tumour suppressor gene mutations  Epidermal growth factor receptor inhibitors, which are used to treat some cancers, often result in an increase in verrucal (warty) keratoses.    There is no easy way to remove multiple lesions on a single occasion.  Unless a specific lesion is \"inflamed\" and is causing pain or stinging/burning or is bleeding, most insurance companies do not authorize treatment.     "

## 2024-09-06 PROCEDURE — 88305 TISSUE EXAM BY PATHOLOGIST: CPT | Performed by: STUDENT IN AN ORGANIZED HEALTH CARE EDUCATION/TRAINING PROGRAM

## 2024-09-06 PROCEDURE — 88341 IMHCHEM/IMCYTCHM EA ADD ANTB: CPT | Performed by: STUDENT IN AN ORGANIZED HEALTH CARE EDUCATION/TRAINING PROGRAM

## 2024-09-06 PROCEDURE — 88342 IMHCHEM/IMCYTCHM 1ST ANTB: CPT | Performed by: STUDENT IN AN ORGANIZED HEALTH CARE EDUCATION/TRAINING PROGRAM

## 2024-09-10 ENCOUNTER — OFFICE VISIT (OUTPATIENT)
Age: 64
End: 2024-09-10
Payer: COMMERCIAL

## 2024-09-10 ENCOUNTER — APPOINTMENT (OUTPATIENT)
Dept: LAB | Facility: HOSPITAL | Age: 64
End: 2024-09-10
Payer: COMMERCIAL

## 2024-09-10 VITALS
TEMPERATURE: 98.6 F | HEART RATE: 109 BPM | HEIGHT: 70 IN | RESPIRATION RATE: 18 BRPM | WEIGHT: 185 LBS | BODY MASS INDEX: 26.48 KG/M2

## 2024-09-10 DIAGNOSIS — E11.9 TYPE 2 DIABETES MELLITUS WITHOUT COMPLICATION, WITHOUT LONG-TERM CURRENT USE OF INSULIN (HCC): ICD-10-CM

## 2024-09-10 DIAGNOSIS — I10 PRIMARY HYPERTENSION: ICD-10-CM

## 2024-09-10 DIAGNOSIS — C44.612 BASAL CELL CARCINOMA (BCC) OF SKIN OF RIGHT UPPER EXTREMITY INCLUDING SHOULDER: Primary | ICD-10-CM

## 2024-09-10 DIAGNOSIS — D09.9 SQUAMOUS CELL CARCINOMA IN SITU (SCCIS): ICD-10-CM

## 2024-09-10 LAB
ALBUMIN SERPL BCG-MCNC: 4 G/DL (ref 3.5–5)
ALP SERPL-CCNC: 58 U/L (ref 34–104)
ALT SERPL W P-5'-P-CCNC: 22 U/L (ref 7–52)
ANION GAP SERPL CALCULATED.3IONS-SCNC: 6 MMOL/L (ref 4–13)
AST SERPL W P-5'-P-CCNC: 15 U/L (ref 13–39)
BILIRUB SERPL-MCNC: 0.64 MG/DL (ref 0.2–1)
BUN SERPL-MCNC: 14 MG/DL (ref 5–25)
CALCIUM SERPL-MCNC: 9.2 MG/DL (ref 8.4–10.2)
CHLORIDE SERPL-SCNC: 102 MMOL/L (ref 96–108)
CO2 SERPL-SCNC: 28 MMOL/L (ref 21–32)
CREAT SERPL-MCNC: 1.23 MG/DL (ref 0.6–1.3)
GFR SERPL CREATININE-BSD FRML MDRD: 61 ML/MIN/1.73SQ M
GLUCOSE P FAST SERPL-MCNC: 208 MG/DL (ref 65–99)
POTASSIUM SERPL-SCNC: 4.1 MMOL/L (ref 3.5–5.3)
PROT SERPL-MCNC: 6.5 G/DL (ref 6.4–8.4)
SODIUM SERPL-SCNC: 136 MMOL/L (ref 135–147)

## 2024-09-10 PROCEDURE — 36415 COLL VENOUS BLD VENIPUNCTURE: CPT

## 2024-09-10 PROCEDURE — 17261 DSTRJ MAL LES T/A/L .6-1.0CM: CPT | Performed by: STUDENT IN AN ORGANIZED HEALTH CARE EDUCATION/TRAINING PROGRAM

## 2024-09-10 PROCEDURE — 80053 COMPREHEN METABOLIC PANEL: CPT

## 2024-09-10 PROCEDURE — 83036 HEMOGLOBIN GLYCOSYLATED A1C: CPT

## 2024-09-10 PROCEDURE — 17270 DSTR MAL LES S/N/H/F/G .5 /<: CPT | Performed by: STUDENT IN AN ORGANIZED HEALTH CARE EDUCATION/TRAINING PROGRAM

## 2024-09-10 NOTE — PATIENT INSTRUCTIONS
DISCUSSION OF TREATMENT AND POSTOP CARE FOR PATIENT    What is curettage and desiccation?  Curettage and desiccation is a type of electrosurgery in which a skin lesion is scraped off and heat is applied to the skin surface.    What is involved in curettage and cautery?  Your doctor will explain to you why your skin lesion needs treatment and the procedure involved. You may have to sign a consent form to indicate that you consent to the surgical procedure. Tell your doctor if you are taking any medication, or if you have any allergies or medical conditions.  The doctor will inject some local anaesthetic into the area surrounding the lesion to be treated. This will make the skin go numb so no pain should be felt during the procedure. You may feel a pushing sensation but this should not be painful. The skin lesion is scraped off with a curette, which is like a small spoon with very sharp edges.  The lesion should be sent to a pathology laboratory for analysis. The wound surface is then cauterised with a hot wire beaded tip or electrosurgical unit (diathermy). This stops bleeding and helps destroys any remaining skin tumour cells.  This procedure is usually repeated twice for malignant skin lesions (serial curettage and cautery).  A dressing may be applied and instructions should be given on how to care for your wound.    What types of skin lesions can be treated by curettage?  Curettage is suitable to treat lesions where the material being scraped off is softer than the surrounding skin or when there is a natural cleavage plane between the lesion and the surrounding normal tissue. The following are sometimes treated by curettage:  Squamous cell carcinoma in situ   Actinic keratoses   Basal cell carcinomas that are large, deep or recurrent are usually not suitable for curettage. Lesions with poorly defined edges are also generally unsuitable.Curettage and desiccation is most often used in more superficial type basal  cell carcinoma.    Will I have a scar?  Curettage often results in some sort of scar especially if accompanied by cautery.   The scars from curettage are usually flat and round. They are a similar size to that of the original skin lesion. Some people have an abnormal response to skin healing and these people may get larger scars than usual (keloids and hypertrophic scarring).    How do I look after the wound following skin curettage?  The wound may be tender when the local anaesthetic wears off.  Leave the dressing in place till the nest day. Avoid strenuous exertion and stretching of the area.   If there is any bleeding, press on the wound firmly with a folded towel without looking at it for 30 minutes. If it is still bleeding after this time, seek medical attention.  Follow instructions a written in our consent ,  The wound from curettage will take approximately 2-3 weeks to heal over. The scar will initially be red and raised but usually reduces in colour and size over several months.

## 2024-09-10 NOTE — PROGRESS NOTES
"[facility] Note     Patient Name: Rory Tsai  Encounter Date: 9/10/24     Have you been cared for by a [facility] Dermatologist in the last 3 years and, if so, which description applies to you?    Yes.  I have been here within the last 3 years, and my medical history has NOT changed since that time.  I am MALE/not capable of bearing children.    REVIEW OF SYSTEMS:  Have you recently had or currently have any of the following? No changes in my recent health.   PAST MEDICAL HISTORY:  Have you personally ever had or currently have any of the following?  If \"YES,\" then please provide more detail. No changes in my medical history.   HISTORY OF IMMUNOSUPPRESSION: Do you have a history of any of the following:  Systemic Immunosuppression such as Diabetes, Biologic or Immunotherapy, Chemotherapy, Organ Transplantation, Bone Marrow Transplantation or Prednisone?  YES, Diabetes ll      Answering \"YES\" requires the addition of the dotphrase \"IMMUNOSUPPRESSED\" as the first diagnosis of the patient's visit.   FAMILY HISTORY:  Any \"first degree relatives\" (parent, brother, sister, or child) with the following?    No changes in my family's known health.   PATIENT EXPERIENCE:    Do you want the Dermatologist to perform a COMPLETE skin exam today including a clinical examination under the \"bra and underwear\" areas?  NO  If necessary, do we have your permission to call and leave a detailed message on your Preferred Phone number that includes your specific medical information?  Yes      No Known Allergies   Current Outpatient Medications:     amLODIPine (NORVASC) 2.5 mg tablet, Take 1 tablet (2.5 mg total) by mouth daily, Disp: 90 tablet, Rfl: 0    atorvastatin (LIPITOR) 20 mg tablet, Take 1 tablet (20 mg total) by mouth every evening, Disp: 90 tablet, Rfl: 1    Blood Glucose Monitoring Suppl (OneTouch Verio Reflect) w/Device KIT, Check blood sugars once daily. Please substitute with appropriate alternative as " covered by patient's insurance. Dx: E11.65, Disp: 1 kit, Rfl: 0    gabapentin (NEURONTIN) 300 mg capsule, Take 1 capsule (300 mg total) by mouth 3 (three) times a day, Disp: 270 capsule, Rfl: 1    glimepiride (AMARYL) 4 mg tablet, TAKE ONE TABLET BY MOUTH EVERY DAY WITH BREKAFAST, Disp: 100 tablet, Rfl: 0    glucose blood (OneTouch Verio) test strip, Check blood sugars once daily. Please substitute with appropriate alternative as covered by patient's insurance. Dx: E11.65, Disp: 100 each, Rfl: 3    lisinopril (ZESTRIL) 40 mg tablet, TAKE ONE TABLET BY MOUTH EVERY DAY, Disp: 90 tablet, Rfl: 1    meloxicam (Mobic) 15 mg tablet, Take 1 tablet (15 mg total) by mouth daily, Disp: 30 tablet, Rfl: 1    OneTouch Delica Lancets 33G MISC, Check blood sugars once daily. Please substitute with appropriate alternative as covered by patient's insurance. Dx: E11.65, Disp: 100 each, Rfl: 3          Whom besides the patient is providing clinical information about today's encounter?   NO ADDITIONAL HISTORIAN (patient alone provided history)    Physical Exam and Assessment/Plan by Diagnosis:      CURETTAGE AND DESICCATION Malignant Lesions    Diagnosis: Basal Cell Carcinoma & Squamous Cell Carcinoma In-Situ  Prior biopsy: Yes  Access Number: [id number]   Location right back (BCC), occipital scalp (SCCIS)  Size preop 9 mm (right back), 5 mm (occipital scalp)   Size postop 1.5 cm x 1 cm (right back), 0.8 cm x 0.5 cm (occipital scalp)       Assessment and Plan:  Based on a thorough discussion of this condition and the management approach to it (including a comprehensive discussion of the known risks, side effects and potential benefits of treatment), the patient (family) agrees to treat the above described lesion with desiccation and curettage.    Procedure:  The area was cleanly  prepped in usual manner  Anesthesia:3:1 1% xylocaine with epi and 1-100,000 buffered   The above described lesion was aggressively curetted to mid dermis  followed by desiccation  Number of cycles of desiccation and curettage 3 on each site   A clean dry dressing was placed on site. Oral and written postop care instructions were discussed and reviewed      DISCUSSION OF TREATMENT AND POSTOP CARE FOR PATIENT    What is curettage and desiccation?  Curettage and desiccation is a type of electrosurgery in which a skin lesion is scraped off and heat is applied to the skin surface.    What is involved in curettage and cautery?  Your doctor will explain to you why your skin lesion needs treatment and the procedure involved. You may have to sign a consent form to indicate that you consent to the surgical procedure. Tell your doctor if you are taking any medication, or if you have any allergies or medical conditions.  The doctor will inject some local anaesthetic into the area surrounding the lesion to be treated. This will make the skin go numb so no pain should be felt during the procedure. You may feel a pushing sensation but this should not be painful. The skin lesion is scraped off with a curette, which is like a small spoon with very sharp edges.  The lesion should be sent to a pathology laboratory for analysis. The wound surface is then cauterised with a hot wire beaded tip or electrosurgical unit (diathermy). This stops bleeding and helps destroys any remaining skin tumour cells.  This procedure is usually repeated twice for malignant skin lesions (serial curettage and cautery).  A dressing may be applied and instructions should be given on how to care for your wound.    What types of skin lesions can be treated by curettage?  Curettage is suitable to treat lesions where the material being scraped off is softer than the surrounding skin or when there is a natural cleavage plane between the lesion and the surrounding normal tissue. The following are sometimes treated by curettage:  Squamous cell carcinoma in situ   Actinic keratoses   Basal cell carcinomas that are  large, deep or recurrent are usually not suitable for curettage. Lesions with poorly defined edges are also generally unsuitable.Curettage and desiccation is most often used in more superficial type basal cell carcinoma.    Will I have a scar?  Curettage often results in some sort of scar especially if accompanied by cautery.   The scars from curettage are usually flat and round. They are a similar size to that of the original skin lesion. Some people have an abnormal response to skin healing and these people may get larger scars than usual (keloids and hypertrophic scarring).    How do I look after the wound following skin curettage?  The wound may be tender when the local anaesthetic wears off.  Leave the dressing in place till the nest day. Avoid strenuous exertion and stretching of the area.   If there is any bleeding, press on the wound firmly with a folded towel without looking at it for 30 minutes. If it is still bleeding after this time, seek medical attention.  Follow instructions a written in our consent ,  The wound from curettage will take approximately 2-3 weeks to heal over. The scar will initially be red and raised but usually reduces in colour and size over several months.      Scribe Attestation      I,:  Erica Patel MA am acting as a scribe while in the presence of the attending physician.:       I,:  Alek Addison DO personally performed the services described in this documentation    as scribed in my presence.:            .

## 2024-09-11 ENCOUNTER — TELEPHONE (OUTPATIENT)
Age: 64
End: 2024-09-11

## 2024-09-11 LAB
EST. AVERAGE GLUCOSE BLD GHB EST-MCNC: 148 MG/DL
HBA1C MFR BLD: 6.8 %

## 2024-09-13 ENCOUNTER — OFFICE VISIT (OUTPATIENT)
Dept: FAMILY MEDICINE CLINIC | Facility: CLINIC | Age: 64
End: 2024-09-13
Payer: COMMERCIAL

## 2024-09-13 VITALS
DIASTOLIC BLOOD PRESSURE: 82 MMHG | OXYGEN SATURATION: 97 % | HEIGHT: 70 IN | WEIGHT: 200 LBS | BODY MASS INDEX: 28.63 KG/M2 | TEMPERATURE: 97.6 F | SYSTOLIC BLOOD PRESSURE: 140 MMHG | HEART RATE: 97 BPM

## 2024-09-13 DIAGNOSIS — I10 PRIMARY HYPERTENSION: Primary | ICD-10-CM

## 2024-09-13 DIAGNOSIS — E11.9 TYPE 2 DIABETES MELLITUS WITHOUT COMPLICATION, WITHOUT LONG-TERM CURRENT USE OF INSULIN (HCC): ICD-10-CM

## 2024-09-13 DIAGNOSIS — E78.2 MIXED HYPERLIPIDEMIA: ICD-10-CM

## 2024-09-13 DIAGNOSIS — Z12.5 SCREENING FOR PROSTATE CANCER: ICD-10-CM

## 2024-09-13 PROCEDURE — 99214 OFFICE O/P EST MOD 30 MIN: CPT | Performed by: NURSE PRACTITIONER

## 2024-09-13 RX ORDER — AMLODIPINE BESYLATE 2.5 MG/1
2.5 TABLET ORAL DAILY
Qty: 90 TABLET | Refills: 1 | Status: SHIPPED | OUTPATIENT
Start: 2024-09-13

## 2024-09-13 RX ORDER — ATORVASTATIN CALCIUM 20 MG/1
20 TABLET, FILM COATED ORAL EVERY EVENING
Qty: 90 TABLET | Refills: 1 | Status: SHIPPED | OUTPATIENT
Start: 2024-09-13 | End: 2025-03-12

## 2024-09-13 RX ORDER — LISINOPRIL 40 MG/1
40 TABLET ORAL DAILY
Qty: 90 TABLET | Refills: 1 | Status: SHIPPED | OUTPATIENT
Start: 2024-09-13

## 2024-09-13 NOTE — ASSESSMENT & PLAN NOTE
Initial blood pressure elevated, improved upon recheck.  Per patient, blood pressures have been 120s to 130s over 80s at home.  Advised to check blood pressure next week or 2.  To return to office for continuously elevated blood pressures as discussed.  Will continue on amlodipine and lisinopril as prescribed.  To obtain blood work prior to next visit.    Orders:    Vitamin D 25 hydroxy; Future    TSH, 3rd generation with Free T4 reflex; Future    Hemoglobin A1C; Future    Comprehensive metabolic panel; Future    CBC and differential; Future    Lipid panel; Future    Albumin / creatinine urine ratio; Future    amLODIPine (NORVASC) 2.5 mg tablet; Take 1 tablet (2.5 mg total) by mouth daily    lisinopril (ZESTRIL) 40 mg tablet; Take 1 tablet (40 mg total) by mouth daily

## 2024-09-13 NOTE — ASSESSMENT & PLAN NOTE
A1c improved from previous reading.  Discussed importance of adhering to diabetic diet, avoiding sugars.  To obtain repeat blood work in 3 months.  Will continue on glimepiride as prescribed    Lab Results   Component Value Date    HGBA1C 6.8 (H) 09/10/2024       Orders:    Vitamin D 25 hydroxy; Future    TSH, 3rd generation with Free T4 reflex; Future    Hemoglobin A1C; Future    Comprehensive metabolic panel; Future    CBC and differential; Future    Lipid panel; Future    Albumin / creatinine urine ratio; Future    atorvastatin (LIPITOR) 20 mg tablet; Take 1 tablet (20 mg total) by mouth every evening

## 2024-09-13 NOTE — PROGRESS NOTES
Ambulatory Visit  Name: Rory Tsai      : 1960      MRN: [id number]  Encounter Provider: YESENIA Elmore  Encounter Date: 2024   Encounter department: [facility] [address] [place] [address]    Assessment & Plan  Primary hypertension  Initial blood pressure elevated, improved upon recheck.  Per patient, blood pressures have been 120s to 130s over 80s at home.  Advised to check blood pressure next week or 2.  To return to office for continuously elevated blood pressures as discussed.  Will continue on amlodipine and lisinopril as prescribed.  To obtain blood work prior to next visit.    Orders:    Vitamin D 25 hydroxy; Future    TSH, 3rd generation with Free T4 reflex; Future    Hemoglobin A1C; Future    Comprehensive metabolic panel; Future    CBC and differential; Future    Lipid panel; Future    Albumin / creatinine urine ratio; Future    amLODIPine (NORVASC) 2.5 mg tablet; Take 1 tablet (2.5 mg total) by mouth daily    lisinopril (ZESTRIL) 40 mg tablet; Take 1 tablet (40 mg total) by mouth daily    Type 2 diabetes mellitus without complication, without long-term current use of insulin ([facility])  A1c improved from previous reading.  Discussed importance of adhering to diabetic diet, avoiding sugars.  To obtain repeat blood work in 3 months.  Will continue on glimepiride as prescribed    Lab Results   Component Value Date    HGBA1C 6.8 (H) 09/10/2024       Orders:    Vitamin D 25 hydroxy; Future    TSH, 3rd generation with Free T4 reflex; Future    Hemoglobin A1C; Future    Comprehensive metabolic panel; Future    CBC and differential; Future    Lipid panel; Future    Albumin / creatinine urine ratio; Future    atorvastatin (LIPITOR) 20 mg tablet; Take 1 tablet (20 mg total) by mouth every evening    Mixed hyperlipidemia    Orders:    Comprehensive metabolic panel; Future    Lipid panel; Future    atorvastatin (LIPITOR) 20 mg tablet; Take 1 tablet (20 mg total) by mouth every  evening    Screening for prostate cancer    Orders:    PSA, Total Screen; Future       History of Present Illness     Patient presents office for 3-month checkup.  Notes compliance with daily medications.  Blood pressures at home per patient have been 130s over 80s.  Does not adhere to diabetic diet.  Denies symptoms related to elevated blood sugars or blood pressure.  Patient denies headaches, dizziness, weakness, chest pain, SOB.        History obtained from : patient  Review of Systems   Constitutional:  Negative for activity change, appetite change and fatigue.   HENT:  Negative for sore throat and trouble swallowing.    Eyes:  Negative for photophobia and visual disturbance.   Respiratory:  Negative for cough and shortness of breath.    Cardiovascular:  Negative for chest pain and palpitations.   Gastrointestinal:  Negative for blood in stool, nausea and vomiting.   Endocrine: Negative for polydipsia, polyphagia and polyuria.   Genitourinary:  Negative for decreased urine volume, flank pain, frequency and urgency.   Musculoskeletal:  Negative for arthralgias and myalgias.   Skin:  Negative for color change and rash.   Neurological:  Negative for dizziness, weakness, light-headedness, numbness and headaches.   Hematological:  Negative for adenopathy. Does not bruise/bleed easily.   Psychiatric/Behavioral:  Negative for dysphoric mood. The patient is not nervous/anxious.      Pertinent Medical History     Medical History Reviewed by provider this encounter:  Tobacco  Allergies  Meds  Problems  Med Hx  Surg Hx  Fam Hx  Soc   Hx          Medical History Reviewed by provider this encounter:  Tobacco  Allergies  Meds  Med Hx  Surg Hx  Fam Hx  Soc Hx      Past Medical History   Past Medical History:   Diagnosis Date    Colon polyp     Diabetes mellitus (HCC)     Hyperlipidemia     Sciatica     Type 2 diabetes mellitus (HCC)      Past Surgical History:   Procedure Laterality Date    COLONOSCOPY        Family History   Problem Relation Age of Onset    Diabetes Mother     Alzheimer's disease Mother     Dementia Father     Prostate cancer Father     Hypertension Father     Hyperlipidemia Father      Current Outpatient Medications on File Prior to Visit   Medication Sig Dispense Refill    Blood Glucose Monitoring Suppl (OneTouch Verio Reflect) w/Device KIT Check blood sugars once daily. Please substitute with appropriate alternative as covered by patient's insurance. Dx: E11.65 1 kit 0    gabapentin (NEURONTIN) 300 mg capsule Take 1 capsule (300 mg total) by mouth 3 (three) times a day 270 capsule 1    glimepiride (AMARYL) 4 mg tablet TAKE ONE TABLET BY MOUTH EVERY DAY WITH BREKAFAST 100 tablet 0    glucose blood (OneTouch Verio) test strip Check blood sugars once daily. Please substitute with appropriate alternative as covered by patient's insurance. Dx: E11.65 100 each 3    meloxicam (Mobic) 15 mg tablet Take 1 tablet (15 mg total) by mouth daily 30 tablet 1    OneTouch Delica Lancets 33G MISC Check blood sugars once daily. Please substitute with appropriate alternative as covered by patient's insurance. Dx: E11.65 100 each 3    [DISCONTINUED] amLODIPine (NORVASC) 2.5 mg tablet Take 1 tablet (2.5 mg total) by mouth daily 90 tablet 0    [DISCONTINUED] atorvastatin (LIPITOR) 20 mg tablet Take 1 tablet (20 mg total) by mouth every evening 90 tablet 1    [DISCONTINUED] lisinopril (ZESTRIL) 40 mg tablet TAKE ONE TABLET BY MOUTH EVERY DAY 90 tablet 1     No current facility-administered medications on file prior to visit.   No Known Allergies   Current Outpatient Medications on File Prior to Visit   Medication Sig Dispense Refill    Blood Glucose Monitoring Suppl (OneTouch Verio Reflect) w/Device KIT Check blood sugars once daily. Please substitute with appropriate alternative as covered by patient's insurance. Dx: E11.65 1 kit 0    gabapentin (NEURONTIN) 300 mg capsule Take 1 capsule (300 mg total) by mouth 3  "(three) times a day 270 capsule 1    glimepiride (AMARYL) 4 mg tablet TAKE ONE TABLET BY MOUTH EVERY DAY WITH BREKAFAST 100 tablet 0    glucose blood (OneTouch Verio) test strip Check blood sugars once daily. Please substitute with appropriate alternative as covered by patient's insurance. Dx: E11.65 100 each 3    meloxicam (Mobic) 15 mg tablet Take 1 tablet (15 mg total) by mouth daily 30 tablet 1    OneTouch Delica Lancets 33G MISC Check blood sugars once daily. Please substitute with appropriate alternative as covered by patient's insurance. Dx: E11.65 100 each 3    [DISCONTINUED] amLODIPine (NORVASC) 2.5 mg tablet Take 1 tablet (2.5 mg total) by mouth daily 90 tablet 0    [DISCONTINUED] atorvastatin (LIPITOR) 20 mg tablet Take 1 tablet (20 mg total) by mouth every evening 90 tablet 1    [DISCONTINUED] lisinopril (ZESTRIL) 40 mg tablet TAKE ONE TABLET BY MOUTH EVERY DAY 90 tablet 1     No current facility-administered medications on file prior to visit.      Social History     Tobacco Use    Smoking status: Some Days     Types: Cigars    Smokeless tobacco: Never    Tobacco comments:     Occasionally   Vaping Use    Vaping status: Never Used   Substance and Sexual Activity    Alcohol use: Yes     Comment: few times a week    Drug use: Not Currently    Sexual activity: Not on file         Objective     /82 (BP Location: Left arm, Patient Position: Sitting)   Pulse 97   Temp 97.6 °F (36.4 °C)   Ht 5' 10\" (1.778 m)   Wt 90.7 kg (200 lb)   SpO2 97%   BMI 28.70 kg/m²     Physical Exam  Vitals reviewed.   Constitutional:       General: He is not in acute distress.     Appearance: Normal appearance. He is not ill-appearing.   HENT:      Head: Normocephalic and atraumatic.      Right Ear: Tympanic membrane, ear canal and external ear normal.      Left Ear: Tympanic membrane, ear canal and external ear normal.      Nose: Nose normal.      Mouth/Throat:      Mouth: Mucous membranes are moist.      Pharynx: " Oropharynx is clear.   Eyes:      Conjunctiva/sclera: Conjunctivae normal.      Pupils: Pupils are equal, round, and reactive to light.   Neck:      Vascular: No carotid bruit.   Cardiovascular:      Rate and Rhythm: Normal rate and regular rhythm.      Pulses: Normal pulses.      Heart sounds: Normal heart sounds. No murmur heard.  Pulmonary:      Effort: Pulmonary effort is normal.      Breath sounds: Normal breath sounds.   Abdominal:      General: Bowel sounds are normal.      Palpations: Abdomen is soft.      Tenderness: There is no abdominal tenderness.   Musculoskeletal:         General: Normal range of motion.      Cervical back: Normal range of motion and neck supple.      Right lower leg: No edema.      Left lower leg: No edema.   Skin:     General: Skin is warm and dry.   Neurological:      General: No focal deficit present.      Mental Status: He is alert and oriented to person, place, and time.   Psychiatric:         Mood and Affect: Mood normal.         Behavior: Behavior normal.

## 2024-09-13 NOTE — ASSESSMENT & PLAN NOTE
Orders:    Comprehensive metabolic panel; Future    Lipid panel; Future    atorvastatin (LIPITOR) 20 mg tablet; Take 1 tablet (20 mg total) by mouth every evening

## 2024-11-14 DIAGNOSIS — M54.16 LUMBAR RADICULOPATHY: ICD-10-CM

## 2024-11-26 ENCOUNTER — TELEPHONE (OUTPATIENT)
Dept: RADIOLOGY | Facility: CLINIC | Age: 64
End: 2024-11-26

## 2024-11-26 DIAGNOSIS — M54.16 LUMBAR RADICULOPATHY: ICD-10-CM

## 2024-11-26 RX ORDER — GABAPENTIN 300 MG/1
300 CAPSULE ORAL 3 TIMES DAILY
Qty: 270 CAPSULE | Refills: 1 | Status: CANCELLED | OUTPATIENT
Start: 2024-11-26

## 2024-11-26 NOTE — TELEPHONE ENCOUNTER
Please schedule OV first available.  Pt requesting refill of Gabapentin  Has not been seen since December when he was given a 6 month supply

## 2024-11-26 NOTE — TELEPHONE ENCOUNTER
Reason for call:   [x] Refill   [] Prior Auth  [] Other:     Office:   [x] PCP/Provider -   [] Specialty/Provider -     Medication: GABAPENTIN    Dose/Frequency: 300 MG    Quantity: 270    Pharmacy:     [address] [place], [place] - [address]  [address] [place] [address]  Phone: [phone]  Fax: [phone]     Does the patient have enough for 3 days?   [x] Yes   [] No - Send as HP to POD

## 2024-11-27 DIAGNOSIS — M54.16 LUMBAR RADICULOPATHY: ICD-10-CM

## 2024-11-27 RX ORDER — GABAPENTIN 300 MG/1
300 CAPSULE ORAL 3 TIMES DAILY
Qty: 90 CAPSULE | Refills: 0 | Status: SHIPPED | OUTPATIENT
Start: 2024-11-27

## 2024-11-27 RX ORDER — GABAPENTIN 300 MG/1
300 CAPSULE ORAL 3 TIMES DAILY
Qty: 270 CAPSULE | Refills: 1 | OUTPATIENT
Start: 2024-11-27

## 2024-11-27 NOTE — TELEPHONE ENCOUNTER
He was given enough for 6 months at Dec 2023 OV. How often is he taking it if he is just running out now? Can send once confirmed

## 2024-11-27 NOTE — TELEPHONE ENCOUNTER
Mistakenly dialed Pt's number  Left detailed message on wife's VM regarding same per SANTHOSH  CB with questions or concerns

## 2024-11-27 NOTE — TELEPHONE ENCOUNTER
Caller: Megha, pt's wife    Doctor: Curry    Reason for call: pt is scheduled for 12/27 with Dr. Zapien.  (Pt's wife cx'd her appt and I scheduled her  in her time slot).  Pt is added to cx list but he will not have enough medication to get him to the appt on 12/27.  Can a script be sent to hold him to his appt on 12/27?    Call back#: [phone]

## 2024-11-27 NOTE — TELEPHONE ENCOUNTER
S/W pt's wife per SANTHOSH  She states she believes he was only taking the Gabapentin 2 times a day most days because he would forget when he was at work.  She states he has 19 days left of the Gabapentin if he was taking it 3 times per day.  States he would come home complaining of pain so she doesn't feel the 2 times per day was effective.  States he told her he was unaware he had to come back for refills  Advised moving forward he would need to have consistent appointments to get refills as this medication was recently placed into the controlled substance category  Wife verbalized understanding  Please advise

## 2024-12-02 DIAGNOSIS — E11.9 TYPE 2 DIABETES MELLITUS WITHOUT COMPLICATION, WITHOUT LONG-TERM CURRENT USE OF INSULIN (HCC): ICD-10-CM

## 2024-12-04 RX ORDER — GLIMEPIRIDE 4 MG/1
4 TABLET ORAL
Qty: 100 TABLET | Refills: 1 | Status: SHIPPED | OUTPATIENT
Start: 2024-12-04

## 2024-12-07 ENCOUNTER — APPOINTMENT (OUTPATIENT)
Dept: LAB | Facility: HOSPITAL | Age: 64
End: 2024-12-07
Payer: COMMERCIAL

## 2024-12-07 DIAGNOSIS — E11.9 TYPE 2 DIABETES MELLITUS WITHOUT COMPLICATION, WITHOUT LONG-TERM CURRENT USE OF INSULIN (HCC): ICD-10-CM

## 2024-12-07 DIAGNOSIS — E78.2 MIXED HYPERLIPIDEMIA: ICD-10-CM

## 2024-12-07 DIAGNOSIS — I10 PRIMARY HYPERTENSION: ICD-10-CM

## 2024-12-07 DIAGNOSIS — Z12.5 SCREENING FOR PROSTATE CANCER: ICD-10-CM

## 2024-12-07 LAB
ALBUMIN SERPL BCG-MCNC: 4 G/DL (ref 3.5–5)
ALP SERPL-CCNC: 55 U/L (ref 34–104)
ALT SERPL W P-5'-P-CCNC: 27 U/L (ref 7–52)
ANION GAP SERPL CALCULATED.3IONS-SCNC: 4 MMOL/L (ref 4–13)
AST SERPL W P-5'-P-CCNC: 20 U/L (ref 13–39)
BASOPHILS # BLD AUTO: 0.06 THOUSANDS/ΜL (ref 0–0.1)
BASOPHILS NFR BLD AUTO: 1 % (ref 0–1)
BILIRUB SERPL-MCNC: 0.63 MG/DL (ref 0.2–1)
BUN SERPL-MCNC: 16 MG/DL (ref 5–25)
CALCIUM SERPL-MCNC: 9.4 MG/DL (ref 8.4–10.2)
CHLORIDE SERPL-SCNC: 102 MMOL/L (ref 96–108)
CHOLEST SERPL-MCNC: 230 MG/DL (ref ?–200)
CO2 SERPL-SCNC: 30 MMOL/L (ref 21–32)
CREAT SERPL-MCNC: 1.28 MG/DL (ref 0.6–1.3)
EOSINOPHIL # BLD AUTO: 0.25 THOUSAND/ΜL (ref 0–0.61)
EOSINOPHIL NFR BLD AUTO: 4 % (ref 0–6)
ERYTHROCYTE [DISTWIDTH] IN BLOOD BY AUTOMATED COUNT: 13 % (ref 11.6–15.1)
GFR SERPL CREATININE-BSD FRML MDRD: 58 ML/MIN/1.73SQ M
GLUCOSE P FAST SERPL-MCNC: 188 MG/DL (ref 65–99)
HCT VFR BLD AUTO: 43.8 % (ref 36.5–49.3)
HDLC SERPL-MCNC: 61 MG/DL
HGB BLD-MCNC: 14.2 G/DL (ref 12–17)
IMM GRANULOCYTES # BLD AUTO: 0.04 THOUSAND/UL (ref 0–0.2)
IMM GRANULOCYTES NFR BLD AUTO: 1 % (ref 0–2)
LDLC SERPL CALC-MCNC: 151 MG/DL (ref 0–100)
LYMPHOCYTES # BLD AUTO: 1.42 THOUSANDS/ΜL (ref 0.6–4.47)
LYMPHOCYTES NFR BLD AUTO: 22 % (ref 14–44)
MCH RBC QN AUTO: 29.7 PG (ref 26.8–34.3)
MCHC RBC AUTO-ENTMCNC: 32.4 G/DL (ref 31.4–37.4)
MCV RBC AUTO: 92 FL (ref 82–98)
MONOCYTES # BLD AUTO: 0.46 THOUSAND/ΜL (ref 0.17–1.22)
MONOCYTES NFR BLD AUTO: 7 % (ref 4–12)
NEUTROPHILS # BLD AUTO: 4.15 THOUSANDS/ΜL (ref 1.85–7.62)
NEUTS SEG NFR BLD AUTO: 65 % (ref 43–75)
NONHDLC SERPL-MCNC: 169 MG/DL
NRBC BLD AUTO-RTO: 0 /100 WBCS
PLATELET # BLD AUTO: 175 THOUSANDS/UL (ref 149–390)
PMV BLD AUTO: 11.8 FL (ref 8.9–12.7)
POTASSIUM SERPL-SCNC: 4.4 MMOL/L (ref 3.5–5.3)
PROT SERPL-MCNC: 6.6 G/DL (ref 6.4–8.4)
RBC # BLD AUTO: 4.78 MILLION/UL (ref 3.88–5.62)
SODIUM SERPL-SCNC: 136 MMOL/L (ref 135–147)
TRIGL SERPL-MCNC: 90 MG/DL (ref ?–150)
TSH SERPL DL<=0.05 MIU/L-ACNC: 2.17 UIU/ML (ref 0.45–4.5)
WBC # BLD AUTO: 6.38 THOUSAND/UL (ref 4.31–10.16)

## 2024-12-07 PROCEDURE — 85025 COMPLETE CBC W/AUTO DIFF WBC: CPT

## 2024-12-07 PROCEDURE — 80053 COMPREHEN METABOLIC PANEL: CPT

## 2024-12-07 PROCEDURE — 36415 COLL VENOUS BLD VENIPUNCTURE: CPT

## 2024-12-07 PROCEDURE — 80061 LIPID PANEL: CPT

## 2024-12-07 PROCEDURE — 82306 VITAMIN D 25 HYDROXY: CPT

## 2024-12-07 PROCEDURE — 84443 ASSAY THYROID STIM HORMONE: CPT

## 2024-12-07 PROCEDURE — 82570 ASSAY OF URINE CREATININE: CPT

## 2024-12-07 PROCEDURE — 83036 HEMOGLOBIN GLYCOSYLATED A1C: CPT

## 2024-12-07 PROCEDURE — G0103 PSA SCREENING: HCPCS

## 2024-12-07 PROCEDURE — 82043 UR ALBUMIN QUANTITATIVE: CPT

## 2024-12-08 LAB
25(OH)D3 SERPL-MCNC: 34.7 NG/ML (ref 30–100)
CREAT UR-MCNC: 143 MG/DL
EST. AVERAGE GLUCOSE BLD GHB EST-MCNC: 157 MG/DL
HBA1C MFR BLD: 7.1 %
MICROALBUMIN UR-MCNC: 13.5 MG/L
MICROALBUMIN/CREAT 24H UR: 9 MG/G CREATININE (ref 0–30)
PSA SERPL-MCNC: 2.97 NG/ML (ref 0–4)

## 2024-12-08 PROCEDURE — 3051F HG A1C>EQUAL 7.0%<8.0%: CPT | Performed by: STUDENT IN AN ORGANIZED HEALTH CARE EDUCATION/TRAINING PROGRAM

## 2024-12-10 ENCOUNTER — RESULTS FOLLOW-UP (OUTPATIENT)
Dept: FAMILY MEDICINE CLINIC | Facility: CLINIC | Age: 64
End: 2024-12-10

## 2024-12-13 ENCOUNTER — OFFICE VISIT (OUTPATIENT)
Dept: FAMILY MEDICINE CLINIC | Facility: CLINIC | Age: 64
End: 2024-12-13
Payer: COMMERCIAL

## 2024-12-13 VITALS
WEIGHT: 198.6 LBS | DIASTOLIC BLOOD PRESSURE: 92 MMHG | BODY MASS INDEX: 28.43 KG/M2 | HEIGHT: 70 IN | TEMPERATURE: 97.5 F | OXYGEN SATURATION: 100 % | HEART RATE: 96 BPM | SYSTOLIC BLOOD PRESSURE: 178 MMHG

## 2024-12-13 DIAGNOSIS — E78.2 MIXED HYPERLIPIDEMIA: ICD-10-CM

## 2024-12-13 DIAGNOSIS — K21.9 GASTROESOPHAGEAL REFLUX DISEASE WITHOUT ESOPHAGITIS: ICD-10-CM

## 2024-12-13 DIAGNOSIS — E11.9 TYPE 2 DIABETES MELLITUS WITHOUT COMPLICATION, WITHOUT LONG-TERM CURRENT USE OF INSULIN (HCC): ICD-10-CM

## 2024-12-13 DIAGNOSIS — I10 PRIMARY HYPERTENSION: ICD-10-CM

## 2024-12-13 DIAGNOSIS — Z00.00 ANNUAL PHYSICAL EXAM: Primary | ICD-10-CM

## 2024-12-13 PROCEDURE — 99396 PREV VISIT EST AGE 40-64: CPT | Performed by: NURSE PRACTITIONER

## 2024-12-13 PROCEDURE — 99214 OFFICE O/P EST MOD 30 MIN: CPT | Performed by: NURSE PRACTITIONER

## 2024-12-13 RX ORDER — FAMOTIDINE 20 MG/1
20 TABLET, FILM COATED ORAL DAILY
Qty: 90 TABLET | Refills: 1 | Status: SHIPPED | OUTPATIENT
Start: 2024-12-13

## 2024-12-13 RX ORDER — AMLODIPINE BESYLATE 5 MG/1
5 TABLET ORAL DAILY
Qty: 90 TABLET | Refills: 1 | Status: SHIPPED | OUTPATIENT
Start: 2024-12-13

## 2024-12-13 NOTE — PATIENT INSTRUCTIONS
"Patient Education     Routine physical for adults   The Basics   Written by the doctors and editors at [facility]   What is a physical? -- A physical is a routine visit, or \"check-up,\" with your doctor. You might also hear it called a \"wellness visit\" or \"preventive visit.\"  During each visit, the doctor will:   Ask about your physical and mental health   Ask about your habits, behaviors, and lifestyle   Do an exam   Give you vaccines if needed   Talk to you about any medicines you take   Give advice about your health   Answer your questions  Getting regular check-ups is an important part of taking care of your health. It can help your doctor find and treat any problems you have. But it's also important for preventing health problems.  A routine physical is different from a \"sick visit.\" A sick visit is when you see a doctor because of a health concern or problem. Since physicals are scheduled ahead of time, you can think about what you want to ask the doctor.  How often should I get a physical? -- It depends on your age and health. In general, for people age 21 years and older:   If you are younger than 50 years, you might be able to get a physical every 3 years.   If you are 50 years or older, your doctor might recommend a physical every year.  If you have an ongoing health condition, like diabetes or high blood pressure, your doctor will probably want to see you more often.  What happens during a physical? -- In general, each visit will include:   Physical exam - The doctor or nurse will check your height, weight, heart rate, and blood pressure. They will also look at your eyes and ears. They will ask about how you are feeling and whether you have any symptoms that bother you.   Medicines - It's a good idea to bring a list of all the medicines you take to each doctor visit. Your doctor will talk to you about your medicines and answer any questions. Tell them if you are having any side effects that bother you. You " "should also tell them if you are having trouble paying for any of your medicines.   Habits and behaviors - This includes:   Your diet   Your exercise habits   Whether you smoke, drink alcohol, or use drugs   Whether you are sexually active   Whether you feel safe at home  Your doctor will talk to you about things you can do to improve your health and lower your risk of health problems. They will also offer help and support. For example, if you want to quit smoking, they can give you advice and might prescribe medicines. If you want to improve your diet or get more physical activity, they can help you with this, too.   Lab tests, if needed - The tests you get will depend on your age and situation. For example, your doctor might want to check your:   Cholesterol   Blood sugar   Iron level   Vaccines - The recommended vaccines will depend on your age, health, and what vaccines you already had. Vaccines are very important because they can prevent certain serious or deadly infections.   Discussion of screening - \"Screening\" means checking for diseases or other health problems before they cause symptoms. Your doctor can recommend screening based on your age, risk, and preferences. This might include tests to check for:   Cancer, such as breast, prostate, cervical, ovarian, colorectal, prostate, lung, or skin cancer   Sexually transmitted infections, such as chlamydia and gonorrhea   Mental health conditions like depression and anxiety  Your doctor will talk to you about the different types of screening tests. They can help you decide which screenings to have. They can also explain what the results might mean.   Answering questions - The physical is a good time to ask the doctor or nurse questions about your health. If needed, they can refer you to other doctors or specialists, too.  Adults older than 65 years often need other care, too. As you get older, your doctor will talk to you about:   How to prevent falling at " home   Hearing or vision tests   Memory testing   How to take your medicines safely   Making sure that you have the help and support you need at home  All topics are updated as new evidence becomes available and our peer review process is complete.  This topic retrieved from [employer] on: May 02, 2024.  Topic 868333 Version 1.0  Release: 32.4.3 - C32.122  © 2024 UpToDate, Inc. and/or its affiliates. All rights reserved.  Consumer Information Use and Disclaimer   Disclaimer: This generalized information is a limited summary of diagnosis, treatment, and/or medication information. It is not meant to be comprehensive and should be used as a tool to help the user understand and/or assess potential diagnostic and treatment options. It does NOT include all information about conditions, treatments, medications, side effects, or risks that may apply to a specific patient. It is not intended to be medical advice or a substitute for the medical advice, diagnosis, or treatment of a health care provider based on the health care provider's examination and assessment of a patient's specific and unique circumstances. Patients must speak with a health care provider for complete information about their health, medical questions, and treatment options, including any risks or benefits regarding use of medications. This information does not endorse any treatments or medications as safe, effective, or approved for treating a specific patient. UpToDate, Inc. and its affiliates disclaim any warranty or liability relating to this information or the use thereof.The use of this information is governed by the Terms of Use, available at https://www.woltersReceptosuwer.com/en/know/clinical-effectiveness-terms. 2024© UpToDate, Inc. and its affiliates and/or licensors. All rights reserved.  Copyright   © 2024 UpToDate, Inc. and/or its affiliates. All rights reserved.

## 2024-12-13 NOTE — ASSESSMENT & PLAN NOTE
Patient stopped taking atorvastatin 3 months ago due to attempting to change his diet.  Recent lipid panel reviewed with patient and advised patient to restart atorvastatin at 20 mg.  To recheck blood work in 3 to 4 months.    Orders:    Comprehensive metabolic panel; Future    Lipid Panel with Direct LDL reflex; Future

## 2024-12-13 NOTE — ASSESSMENT & PLAN NOTE
Blood pressure not within goal today.  Patient admits to sedentary lifestyle, poor diet, began drinking beer again.  Discussed with patient importance of weight management, physical activity as tolerated, diet modifications including reduction of alcohol use.  Discussed importance of heart healthy diet.  Will continue lisinopril 40 mg.  Will increase amlodipine from 2.5 mg to 5 mg.  Advised to continue to monitor blood pressure at home.  To return in 4 weeks for reevaluation.  Discussed emergent symptoms of when to seek immediate care.    Orders:    amLODIPine (NORVASC) 5 mg tablet; Take 1 tablet (5 mg total) by mouth daily

## 2024-12-13 NOTE — ASSESSMENT & PLAN NOTE
Lab Results   Component Value Date    HGBA1C 7.1 (H) 12/07/2024     A1c increased from previous reading.  Patient admits to falling off diabetic diet, began drinking alcohol again.  At this time, patient will adhere back to diabetic diet.  Discussed importance of limiting alcohol intake, getting physical activity, weight management.  Will recheck A1c in 3 to 4 months.    Orders:    Hemoglobin A1C; Future    Comprehensive metabolic panel; Future

## 2024-12-13 NOTE — PROGRESS NOTES
Diabetic Foot Exam    Patient's shoes and socks removed.    Right Foot/Ankle   Right Foot Inspection  Skin Exam: skin normal and skin intact. No dry skin, no warmth, no callus, no erythema, no maceration, no abnormal color, no pre-ulcer, no ulcer and no callus.     Toe Exam: ROM and strength within normal limits.     Sensory   Monofilament testing: intact    Vascular  The right DP pulse is 2+.     Left Foot/Ankle  Left Foot Inspection  Skin Exam: skin normal and skin intact. No dry skin, no warmth, no erythema, no maceration, normal color, no pre-ulcer, no ulcer and no callus.     Toe Exam: ROM and strength within normal limits.     Sensory   Monofilament testing: intact    Vascular  The left DP pulse is 2+.     Assign Risk Category  No deformity present  No loss of protective sensation  No weak pulses  Risk: 0      Adult Annual Physical  Name: Rory Tsai      : 1960      MRN: [id number]  Encounter Provider: YESENIA Elmore  Encounter Date: 2024   Encounter department: [address]    Assessment & Plan  Annual physical exam         Primary hypertension  Blood pressure not within goal today.  Patient admits to sedentary lifestyle, poor diet, began drinking beer again.  Discussed with patient importance of weight management, physical activity as tolerated, diet modifications including reduction of alcohol use.  Discussed importance of heart healthy diet.  Will continue lisinopril 40 mg.  Will increase amlodipine from 2.5 mg to 5 mg.  Advised to continue to monitor blood pressure at home.  To return in 4 weeks for reevaluation.  Discussed emergent symptoms of when to seek immediate care.    Orders:    amLODIPine (NORVASC) 5 mg tablet; Take 1 tablet (5 mg total) by mouth daily    Type 2 diabetes mellitus without complication, without long-term current use of insulin ([facility])    Lab Results   Component Value Date    HGBA1C 7.1 (H) 2024     A1c  increased from previous reading.  Patient admits to falling off diabetic diet, began drinking alcohol again.  At this time, patient will adhere back to diabetic diet.  Discussed importance of limiting alcohol intake, getting physical activity, weight management.  Will recheck A1c in 3 to 4 months.    Orders:    Hemoglobin A1C; Future    Comprehensive metabolic panel; Future    Mixed hyperlipidemia  Patient stopped taking atorvastatin 3 months ago due to attempting to change his diet.  Recent lipid panel reviewed with patient and advised patient to restart atorvastatin at 20 mg.  To recheck blood work in 3 to 4 months.    Orders:    Comprehensive metabolic panel; Future    Lipid Panel with Direct LDL reflex; Future    Gastroesophageal reflux disease without esophagitis  To restart famotidine 20 mg as previously prescribed.  Discussed importance of avoiding triggers like spicy/acidic food, caffeine, alcohol.  To avoid eating heavy meals then laying down.  To avoid eating 2 hours prior to bedtime.    Orders:    famotidine (PEPCID) 20 mg tablet; Take 1 tablet (20 mg total) by mouth daily      Immunizations and preventive care screenings were discussed with patient today. Appropriate education was printed on patient's after visit summary.    Discussed risks and benefits of prostate cancer screening. We discussed the controversial history of PSA screening for prostate cancer in the [place] as well as the risk of over detection and over treatment of prostate cancer by way of PSA screening.  The patient understands that PSA blood testing is an imperfect way to screen for prostate cancer and that elevated PSA levels in the blood may also be caused by infection, inflammation, prostatic trauma or manipulation, urological procedures, or by benign prostatic enlargement.    The role of the digital rectal examination in prostate cancer screening was also discussed and I discussed with him that there is large interobserver  variability in the findings of digital rectal examination.    Counseling:  Alcohol/drug use: discussed moderation in alcohol intake, the recommendations for healthy alcohol use, and avoidance of illicit drug use.  Dental Health: discussed importance of regular tooth brushing, flossing, and dental visits.  Injury prevention: discussed safety/seat belts, safety helmets, smoke detectors, carbon monoxide detectors, and smoking near bedding or upholstery.  Sexual health: discussed sexually transmitted diseases, partner selection, use of condoms, avoidance of unintended pregnancy, and contraceptive alternatives.  Exercise: the importance of regular exercise/physical activity was discussed. Recommend exercise 3-5 times per week for at least 30 minutes.       Depression Screening and Follow-up Plan: Patient was screened for depression during today's encounter. They screened negative with a PHQ-2 score of 0.    Tobacco Cessation Counseling: Tobacco cessation counseling was not provided. The patient is sincerely urged to quit consumption of tobacco. He is not ready to quit tobacco.         History of Present Illness     Adult Annual Physical:  Patient presents for annual physical.     Diet and Physical Activity:  - Diet/Nutrition: poor diet.  - Exercise: no formal exercise.    Depression Screening:  - PHQ-2 Score: 0    General Health:  - Sleep: 4-6 hours of sleep on average and sleeps well.  - Hearing: normal hearing right ear.  - Vision: goes for regular eye exams and wears glasses.  - Dental: brushes teeth twice daily.     Health:  - History of STDs: no.   - Urinary symptoms: none.     Advanced Care Planning:  - Has an advanced directive?: no    - Has a durable medical POA?: no    - ACP document given to patient?: no      Review of Systems   Constitutional:  Negative for activity change, appetite change, fatigue and unexpected weight change.   HENT:  Negative for congestion, ear pain, sore throat and trouble swallowing.     Eyes:  Negative for photophobia and visual disturbance.   Respiratory:  Negative for cough, chest tightness and shortness of breath.    Cardiovascular:  Negative for chest pain and palpitations.   Gastrointestinal:  Negative for abdominal pain, blood in stool, constipation, diarrhea, nausea and vomiting.   Endocrine: Negative for polydipsia, polyphagia and polyuria.   Genitourinary:  Negative for decreased urine volume, dysuria, flank pain, frequency, hematuria, testicular pain and urgency.   Musculoskeletal:  Negative for arthralgias, back pain and myalgias.   Skin:  Negative for color change and rash.   Neurological:  Negative for dizziness, syncope, weakness, light-headedness, numbness and headaches.   Hematological:  Negative for adenopathy. Does not bruise/bleed easily.   Psychiatric/Behavioral:  Negative for agitation, dysphoric mood and sleep disturbance. The patient is not nervous/anxious.      Medical History Reviewed by provider this encounter:  Tobacco  Allergies  Meds  Problems  Med Hx  Surg Hx  Fam Hx  Soc   Hx      Medical History Reviewed by provider this encounter:  Tobacco  Allergies  Meds  Problems  Med Hx  Surg Hx  Fam Hx  Soc   Hx      Medical History Reviewed by provider this encounter:  Tobacco  Allergies  Meds  Problems  Med Hx  Surg Hx  Fam Hx  Soc   Medical History Reviewed by provider this encounter:  Tobacco  Allergies  Meds  Problems  Med Hx  Surg Hx  Fam Hx  Soc   Hx      Medical History Reviewed by provider this encounter:  Tobacco  Allergies  Meds  Problems  Med Hx  Surg Hx  Fam Hx  Soc   Hx      Medical History Reviewed by provider this encounter:  Tobacco  Allergies  Meds  Problems  Med Hx  Surg Hx  Fam Hx  Soc   Medical History Reviewed by provider this encounter:  Tobacco  Allergies  Meds  Problems  Med Hx  Surg Hx  Fam Hx  Soc   Hx      Medical History Reviewed by provider this encounter:  Tobacco  Allergies  Meds  Problems   Med Hx  Surg Hx  Fam Hx  Soc   Hx      Medical History Reviewed by provider this encounter:  Tobacco  Allergies  Meds  Problems  Med Hx  Surg Hx  Fam Hx  Soc   Medical History Reviewed by provider this encounter:  Tobacco  Allergies  Meds  Problems  Med Hx  Surg Hx  Fam Hx  Soc   Hx      Medical History Reviewed by provider this encounter:  Tobacco  Allergies  Meds  Problems  Med Hx  Surg Hx  Fam Hx  Soc   Hx      Medical History Reviewed by provider this encounter:  Tobacco  Allergies  Meds  Problems  Med Hx  Surg Hx  Fam Hx  Soc   Medical History Reviewed by provider this encounter:  Tobacco  Allergies  Meds  Problems  Med Hx  Surg Hx  Fam Hx  Soc   Hx      Medical History Reviewed by provider this encounter:  Tobacco  Allergies  Meds  Problems  Med Hx  Surg Hx  Fam Hx  Soc   Hx      Medical History Reviewed by provider this encounter:  Tobacco  Allergies  Meds  Problems  Med Hx  Surg Hx  Fam Hx  Soc   Medical History Reviewed by provider this encounter:  Tobacco  Allergies  Meds  Problems  Med Hx  Surg Hx  Fam Hx  Soc   Hx      Medical History Reviewed by provider this encounter:  Tobacco  Allergies  Meds  Problems  Med Hx  Surg Hx  Fam Hx  Soc   Hx      Medical History Reviewed by provider this encounter:  Tobacco  Allergies  Meds  Problems  Med Hx  Surg Hx  Fam Hx  Soc   Medical History Reviewed by provider this encounter:  Tobacco  Allergies  Meds  Problems  Med Hx  Surg Hx  Fam Hx  Soc   Hx      Medical History Reviewed by provider this encounter:  Tobacco  Allergies  Meds  Problems  Med Hx  Surg Hx  Fam Hx  Soc   Hx      Medical History Reviewed by provider this encounter:  Tobacco  Allergies  Meds  Problems  Med Hx  Surg Hx  Fam Hx  Soc   Medical History Reviewed by provider this encounter:  Tobacco  Allergies  Meds  Problems  Med Hx  Surg Hx  Fam Hx  Soc   Hx      Medical History Reviewed by provider this  encounter:  Tobacco  Allergies  Meds  Problems  Med Hx  Surg Hx  Fam Hx  Soc   Hx      Medical History Reviewed by provider this encounter:  Tobacco  Allergies  Meds  Problems  Med Hx  Surg Hx  Fam Hx  Soc   Medical History Reviewed by provider this encounter:  Tobacco  Allergies  Meds  Problems  Med Hx  Surg Hx  Fam Hx  Soc   Hx      Medical History Reviewed by provider this encounter:  Tobacco  Allergies  Meds  Problems  Med Hx  Surg Hx  Fam Hx  Soc   Hx      Medical History Reviewed by provider this encounter:  Tobacco  Allergies  Meds  Problems  Med Hx  Surg Hx  Fam Hx  Soc   Medical History Reviewed by provider this encounter:  Tobacco  Allergies  Meds  Problems  Med Hx  Surg Hx  Fam Hx  Soc   Hx      Medical History Reviewed by provider this encounter:  Tobacco  Allergies  Meds  Problems  Med Hx  Surg Hx  Fam Hx  Soc   Hx      Medical History Reviewed by provider this encounter:  Tobacco  Allergies  Meds  Problems  Med Hx  Surg Hx  Fam Hx  Soc   Medical History Reviewed by provider this encounter:  Tobacco  Allergies  Meds  Problems  Med Hx  Surg Hx  Fam Hx  Soc   Hx      Medical History Reviewed by provider this encounter:  Tobacco  Allergies  Meds  Problems  Med Hx  Surg Hx  Fam Hx  Soc   Hx      Medical History Reviewed by provider this encounter:  Tobacco  Allergies  Meds  Problems  Med Hx  Surg Hx  Fam Hx  Soc   Medical History Reviewed by provider this encounter:  Tobacco  Allergies  Meds  Problems  Med Hx  Surg Hx  Fam Hx  Soc   Hx      Medical History Reviewed by provider this encounter:  Tobacco  Allergies  Meds  Problems  Med Hx  Surg Hx  Fam Hx  Soc   Hx      Medical History Reviewed by provider this encounter:  Tobacco  Allergies  Meds  Problems  Med Hx  Surg Hx  Fam Hx  Soc   Medical History Reviewed by provider this encounter:  Tobacco  Allergies  Meds  Problems  Med Hx  Surg Hx  Fam Hx  Soc    Hx      Medical History Reviewed by provider this encounter:  Tobacco  Allergies  Meds  Problems  Med Hx  Surg Hx  Fam Hx  Soc   Hx      Medical History Reviewed by provider this encounter:  Tobacco  Allergies  Meds  Problems  Med Hx  Surg Hx  Fam Hx  Soc   Medical History Reviewed by provider this encounter:  Tobacco  Allergies  Meds  Problems  Med Hx  Surg Hx  Fam Hx  Soc   Hx      Medical History Reviewed by provider this encounter:  Tobacco  Allergies  Meds  Problems  Med Hx  Surg Hx  Fam Hx  Soc   Hx      Medical History Reviewed by provider this encounter:  Tobacco  Allergies  Meds  Problems  Med Hx  Surg Hx  Fam Hx  Soc   Medical History Reviewed by provider this encounter:  Tobacco  Allergies  Meds  Problems  Med Hx  Surg Hx  Fam Hx  Soc   Hx      Medical History Reviewed by provider this encounter:  Tobacco  Allergies  Meds  Problems  Med Hx  Surg Hx  Fam Hx  Soc   Hx      Medical History Reviewed by provider this encounter:  Tobacco  Allergies  Meds  Problems  Med Hx  Surg Hx  Fam Hx  Soc   Medical History Reviewed by provider this encounter:  Tobacco  Allergies  Meds  Problems  Med Hx  Surg Hx  Fam Hx  Soc   Hx      Medical History Reviewed by provider this encounter:  Tobacco  Allergies  Meds  Problems  Med Hx  Surg Hx  Fam Hx  Soc   Hx      Medical History Reviewed by provider this encounter:  Tobacco  Allergies  Meds  Problems  Med Hx  Surg Hx  Fam Hx  Soc   Medical History Reviewed by provider this encounter:  Tobacco  Allergies  Meds  Problems  Med Hx  Surg Hx  Fam Hx  Soc   Hx      Medical History Reviewed by provider this encounter:  Tobacco  Allergies  Meds  Problems  Med Hx  Surg Hx  Fam Hx  Soc   Hx      Medical History Reviewed by provider this encounter:  Tobacco  Allergies  Meds  Problems  Med Hx  Surg Hx  Fam Hx  Soc   Medical History Reviewed by provider this encounter:  Tobacco  Allergies   Meds  Problems  Med Hx  Surg Hx  Fam Hx  Soc   Hx      Medical History Reviewed by provider this encounter:  Tobacco  Allergies  Meds  Problems  Med Hx  Surg Hx  Fam Hx  Soc   Hx      Medical History Reviewed by provider this encounter:  Tobacco  Allergies  Meds  Problems  Med Hx  Surg Hx  Fam Hx  Soc   Medical History Reviewed by provider this encounter:  Tobacco  Allergies  Meds  Problems  Med Hx  Surg Hx  Fam Hx  Soc   Hx      Medical History Reviewed by provider this encounter:  Tobacco  Allergies  Meds  Problems  Med Hx  Surg Hx  Fam Hx  Soc   Hx      Medical History Reviewed by provider this encounter:  Tobacco  Allergies  Meds  Problems  Med Hx  Surg Hx  Fam Hx  Soc   Medical History Reviewed by provider this encounter:  Tobacco  Allergies  Meds  Problems  Med Hx  Surg Hx  Fam Hx  Soc   Hx      Medical History Reviewed by provider this encounter:  Tobacco  Allergies  Meds  Problems  Med Hx  Surg Hx  Fam Hx  Soc   Hx      Medical History Reviewed by provider this encounter:  Tobacco  Allergies  Meds  Problems  Med Hx  Surg Hx  Fam Hx  Soc   Medical History Reviewed by provider this encounter:  Tobacco  Allergies  Meds  Problems  Med Hx  Surg Hx  Fam Hx  Soc   Hx      Medical History Reviewed by provider this encounter:  Tobacco  Allergies  Meds  Problems  Med Hx  Surg Hx  Fam Hx  Soc   Hx      Medical History Reviewed by provider this encounter:  Tobacco  Allergies  Meds  Problems  Med Hx  Surg Hx  Fam Hx  Soc   Medical History Reviewed by provider this encounter:  Tobacco  Allergies  Meds  Problems  Med Hx  Surg Hx  Fam Hx  Soc   Hx      Medical History Reviewed by provider this encounter:  Tobacco  Allergies  Meds  Problems  Med Hx  Surg Hx  Fam Hx  Soc   Hx      Medical History Reviewed by provider this encounter:  Tobacco  Allergies  Meds  Problems  Med Hx  Surg Hx  Fam Hx  Soc   Medical History Reviewed  by provider this encounter:  Tobacco  Allergies  Meds  Problems  Med Hx  Surg Hx  Fam Hx  Soc   Hx      Medical History Reviewed by provider this encounter:  Tobacco  Allergies  Meds  Problems  Med Hx  Surg Hx  Fam Hx  Soc   Hx      Medical History Reviewed by provider this encounter:  Tobacco  Allergies  Meds  Problems  Med Hx  Surg Hx  Fam Hx  Soc   Medical History Reviewed by provider this encounter:  Tobacco  Allergies  Meds  Problems  Med Hx  Surg Hx  Fam Hx  Soc   Hx      Medical History Reviewed by provider this encounter:  Tobacco  Allergies  Meds  Problems  Med Hx  Surg Hx  Fam Hx  Soc   Hx      Medical History Reviewed by provider this encounter:  Tobacco  Allergies  Meds  Problems  Med Hx  Surg Hx  Fam Hx  Soc   Medical History Reviewed by provider this encounter:  Tobacco  Allergies  Meds  Problems  Med Hx  Surg Hx  Fam Hx  Soc   Hx      Medical History Reviewed by provider this encounter:  Tobacco  Allergies  Meds  Problems  Med Hx  Surg Hx  Fam Hx  Soc   Hx      Medical History Reviewed by provider this encounter:  Tobacco  Allergies  Meds  Problems  Med Hx  Surg Hx  Fam Hx  Soc   Medical History Reviewed by provider this encounter:  Tobacco  Allergies  Meds  Problems  Med Hx  Surg Hx  Fam Hx  Soc   Hx      Medical History Reviewed by provider this encounter:  Tobacco  Allergies  Meds  Problems  Med Hx  Surg Hx  Fam Hx  Soc   Hx      Medical History Reviewed by provider this encounter:  Tobacco  Allergies  Meds  Problems  Med Hx  Surg Hx  Fam Hx  Soc       Medical History Reviewed by provider this encounter:  Tobacco  Allergies  Meds  Problems  Med Hx  Surg Hx  Fam Hx  Soc   Hx    .  Past Medical History   Past Medical History:   Diagnosis Date    Colon polyp     Diabetes mellitus (HCC)     Hyperlipidemia     Sciatica     Type 2 diabetes mellitus (HCC)      Past Surgical History:   Procedure Laterality Date     COLONOSCOPY       Family History   Problem Relation Age of Onset    Diabetes Mother     Alzheimer's disease Mother     Dementia Father     Prostate cancer Father     Hypertension Father     Hyperlipidemia Father       reports that he has been smoking cigars. He has never used smokeless tobacco. He reports current alcohol use. He reports that he does not currently use drugs.  Current Outpatient Medications on File Prior to Visit   Medication Sig Dispense Refill    Blood Glucose Monitoring Suppl (OneTouch Verio Reflect) w/Device KIT Check blood sugars once daily. Please substitute with appropriate alternative as covered by patient's insurance. Dx: E11.65 1 kit 0    gabapentin (NEURONTIN) 300 mg capsule Take 1 capsule (300 mg total) by mouth 3 (three) times a day 90 capsule 0    glimepiride (AMARYL) 4 mg tablet TAKE ONE TABLET BY MOUTH EVERY DAY WITH BREAKFAST 100 tablet 1    glucose blood (OneTouch Verio) test strip Check blood sugars once daily. Please substitute with appropriate alternative as covered by patient's insurance. Dx: E11.65 100 each 3    lisinopril (ZESTRIL) 40 mg tablet Take 1 tablet (40 mg total) by mouth daily 90 tablet 1    meloxicam (Mobic) 15 mg tablet Take 1 tablet (15 mg total) by mouth daily 30 tablet 1    OneTouch Delica Lancets 33G MISC Check blood sugars once daily. Please substitute with appropriate alternative as covered by patient's insurance. Dx: E11.65 100 each 3    [DISCONTINUED] amLODIPine (NORVASC) 2.5 mg tablet Take 1 tablet (2.5 mg total) by mouth daily 90 tablet 1    atorvastatin (LIPITOR) 20 mg tablet Take 1 tablet (20 mg total) by mouth every evening (Patient not taking: Reported on 12/13/2024) 90 tablet 1     No current facility-administered medications on file prior to visit.   No Known Allergies   Current Outpatient Medications on File Prior to Visit   Medication Sig Dispense Refill    Blood Glucose Monitoring Suppl (OneTouch Verio Reflect) w/Device KIT Check blood sugars once  "daily. Please substitute with appropriate alternative as covered by patient's insurance. Dx: E11.65 1 kit 0    gabapentin (NEURONTIN) 300 mg capsule Take 1 capsule (300 mg total) by mouth 3 (three) times a day 90 capsule 0    glimepiride (AMARYL) 4 mg tablet TAKE ONE TABLET BY MOUTH EVERY DAY WITH BREAKFAST 100 tablet 1    glucose blood (OneTouch Verio) test strip Check blood sugars once daily. Please substitute with appropriate alternative as covered by patient's insurance. Dx: E11.65 100 each 3    lisinopril (ZESTRIL) 40 mg tablet Take 1 tablet (40 mg total) by mouth daily 90 tablet 1    meloxicam (Mobic) 15 mg tablet Take 1 tablet (15 mg total) by mouth daily 30 tablet 1    OneTouch Delica Lancets 33G MISC Check blood sugars once daily. Please substitute with appropriate alternative as covered by patient's insurance. Dx: E11.65 100 each 3    [DISCONTINUED] amLODIPine (NORVASC) 2.5 mg tablet Take 1 tablet (2.5 mg total) by mouth daily 90 tablet 1    atorvastatin (LIPITOR) 20 mg tablet Take 1 tablet (20 mg total) by mouth every evening (Patient not taking: Reported on 12/13/2024) 90 tablet 1     No current facility-administered medications on file prior to visit.      Social History     Tobacco Use    Smoking status: Some Days     Types: Cigars    Smokeless tobacco: Never    Tobacco comments:     Occasionally   Vaping Use    Vaping status: Never Used   Substance and Sexual Activity    Alcohol use: Yes     Comment: few times a week    Drug use: Not Currently    Sexual activity: Not on file       Objective   BP (!) 178/92 (BP Location: Left arm, Patient Position: Sitting)   Pulse 96   Temp 97.5 °F (36.4 °C)   Ht 5' 10\" (1.778 m)   Wt 90.1 kg (198 lb 9.6 oz)   SpO2 100%   BMI 28.50 kg/m²     Physical Exam  Vitals reviewed.   Constitutional:       General: He is not in acute distress.     Appearance: Normal appearance. He is well-developed. He is not ill-appearing.   HENT:      Head: Normocephalic and atraumatic. "      Right Ear: Tympanic membrane, ear canal and external ear normal.      Left Ear: Tympanic membrane, ear canal and external ear normal.      Nose: Nose normal.      Mouth/Throat:      Mouth: Mucous membranes are moist.      Pharynx: Oropharynx is clear.   Eyes:      Extraocular Movements: Extraocular movements intact.      Conjunctiva/sclera: Conjunctivae normal.      Pupils: Pupils are equal, round, and reactive to light.   Neck:      Vascular: No carotid bruit.   Cardiovascular:      Rate and Rhythm: Normal rate and regular rhythm.      Pulses: Normal pulses. no weak pulses.           Dorsalis pedis pulses are 2+ on the right side and 2+ on the left side.      Heart sounds: Normal heart sounds. No murmur heard.  Pulmonary:      Effort: Pulmonary effort is normal. No respiratory distress.      Breath sounds: Normal breath sounds.   Abdominal:      General: Bowel sounds are normal.      Palpations: Abdomen is soft.      Tenderness: There is no abdominal tenderness. There is no right CVA tenderness or left CVA tenderness.   Musculoskeletal:         General: No swelling. Normal range of motion.      Cervical back: Normal range of motion and neck supple.   Feet:      Right foot:      Skin integrity: No ulcer, skin breakdown, erythema, warmth, callus or dry skin.      Left foot:      Skin integrity: No ulcer, skin breakdown, erythema, warmth, callus or dry skin.   Lymphadenopathy:      Cervical: No cervical adenopathy.   Skin:     General: Skin is warm and dry.      Capillary Refill: Capillary refill takes less than 2 seconds.   Neurological:      General: No focal deficit present.      Mental Status: He is alert and oriented to person, place, and time.   Psychiatric:         Mood and Affect: Mood normal.         Behavior: Behavior normal.

## 2024-12-23 NOTE — PROGRESS NOTES
Assessment:  1. Lumbar radiculopathy    2. Peroneal neuropathy, left    3. Lumbar spondylosis        Plan:    64-year-old male returns to our office for follow-up.  Reports he continues to do well with gabapentin 300 mg 3 times daily without any significant side effects.  Therefore we will continue the medication as he is given ongoing pain relief with this regimen.  He reports some low-grade back pain and left groin pain but this is relatively well-controlled at this time.  He will alert us if his symptoms worsen in the coming months.    [place] Prescription Drug Monitoring Program report was reviewed and was appropriate     Complete risks and benefits including bleeding, infection, tissue reaction, nerve injury and allergic reaction were discussed. The approach was demonstrated using models and literature was provided. Verbal and written consent was obtained.    My impressions and treatment recommendations were discussed in detail with the patient who verbalized understanding and had no further questions.  Discharge instructions were provided. I personally saw and examined the patient and I agree with the above discussed plan of care.    No orders of the defined types were placed in this encounter.    New Medications Ordered This Visit   Medications    gabapentin (NEURONTIN) 300 mg capsule     Sig: Take 1 capsule (300 mg total) by mouth 3 (three) times a day     Dispense:  270 capsule     Refill:  1       History of Present Illness:  Rory Tsai is a 64 y.o. male who presents for a follow up office visit in regards to Back Pain, Leg Pain (Left leg and top of foot tingling ), and Foot Pain (Left top of the foot has tingling ).   The patient’s current symptoms include lower back pain and neuropathic pain the left lower extremity.     Pain score 2/10. Most notable in the evening and intermittent, burning/dull/aching pressure like with pins and needles.     Takes gabapentin 300 mg 3 times daily with excellent  control of his symptoms without any significant side effects.    I have personally reviewed and/or updated the patient's past medical history, past surgical history, family history, social history, current medications, allergies, and vital signs today.     Review of Systems   Musculoskeletal:         DELISA       Patient Active Problem List   Diagnosis    Type 2 diabetes mellitus without complication, without long-term current use of insulin (HCC)    Mixed hyperlipidemia    Primary hypertension    Lumbar radiculopathy    Bilateral sciatica       Past Medical History:   Diagnosis Date    Colon polyp     Diabetes mellitus (HCC)     Hyperlipidemia     Sciatica     Type 2 diabetes mellitus (HCC)        Past Surgical History:   Procedure Laterality Date    COLONOSCOPY         Family History   Problem Relation Age of Onset    Diabetes Mother     Alzheimer's disease Mother     Dementia Father     Prostate cancer Father     Hypertension Father     Hyperlipidemia Father        Social History     Occupational History    Not on file   Tobacco Use    Smoking status: Some Days     Types: Cigars    Smokeless tobacco: Never    Tobacco comments:     Occasionally   Vaping Use    Vaping status: Never Used   Substance and Sexual Activity    Alcohol use: Yes     Comment: few times a week    Drug use: Not Currently    Sexual activity: Not on file       Current Outpatient Medications on File Prior to Visit   Medication Sig    amLODIPine (NORVASC) 5 mg tablet Take 1 tablet (5 mg total) by mouth daily    Blood Glucose Monitoring Suppl (OneTouch Verio Reflect) w/Device KIT Check blood sugars once daily. Please substitute with appropriate alternative as covered by patient's insurance. Dx: E11.65    famotidine (PEPCID) 20 mg tablet Take 1 tablet (20 mg total) by mouth daily    glimepiride (AMARYL) 4 mg tablet TAKE ONE TABLET BY MOUTH EVERY DAY WITH BREAKFAST    glucose blood (OneTouch Verio) test strip Check blood sugars once daily. Please  "substitute with appropriate alternative as covered by patient's insurance. Dx: E11.65    lisinopril (ZESTRIL) 40 mg tablet Take 1 tablet (40 mg total) by mouth daily    meloxicam (Mobic) 15 mg tablet Take 1 tablet (15 mg total) by mouth daily    OneTouch Delica Lancets 33G MISC Check blood sugars once daily. Please substitute with appropriate alternative as covered by patient's insurance. Dx: E11.65    [DISCONTINUED] gabapentin (NEURONTIN) 300 mg capsule Take 1 capsule (300 mg total) by mouth 3 (three) times a day    atorvastatin (LIPITOR) 20 mg tablet Take 1 tablet (20 mg total) by mouth every evening (Patient not taking: Reported on 12/13/2024)     No current facility-administered medications on file prior to visit.       No Known Allergies    Physical Exam:    Ht 5' 10\" (1.778 m)   Wt 90.1 kg (198 lb 10.2 oz)   BMI 28.50 kg/m²     Constitutional:normal, well developed, well nourished, alert, in no distress and non-toxic and no overt pain behavior.  Eyes:anicteric  HEENT:grossly intact  Neck:supple, symmetric, trachea midline and no masses   Pulmonary:even and unlabored  Cardiovascular:No edema or pitting edema present  Skin:Normal without rashes or lesions and well hydrated  Psychiatric:Mood and affect appropriate  Neurologic:Cranial Nerves II-XII grossly intact  Musculoskeletal:normal    Imaging    "

## 2024-12-27 ENCOUNTER — OFFICE VISIT (OUTPATIENT)
Dept: PAIN MEDICINE | Facility: CLINIC | Age: 64
End: 2024-12-27
Payer: COMMERCIAL

## 2024-12-27 VITALS — WEIGHT: 198.63 LBS | HEIGHT: 70 IN | BODY MASS INDEX: 28.44 KG/M2

## 2024-12-27 DIAGNOSIS — M47.816 LUMBAR SPONDYLOSIS: ICD-10-CM

## 2024-12-27 DIAGNOSIS — G57.32 PERONEAL NEUROPATHY, LEFT: ICD-10-CM

## 2024-12-27 DIAGNOSIS — M54.16 LUMBAR RADICULOPATHY: Primary | ICD-10-CM

## 2024-12-27 PROCEDURE — 99214 OFFICE O/P EST MOD 30 MIN: CPT | Performed by: STUDENT IN AN ORGANIZED HEALTH CARE EDUCATION/TRAINING PROGRAM

## 2024-12-27 RX ORDER — GABAPENTIN 300 MG/1
300 CAPSULE ORAL 3 TIMES DAILY
Qty: 270 CAPSULE | Refills: 1 | Status: SHIPPED | OUTPATIENT
Start: 2024-12-27

## 2025-01-10 ENCOUNTER — OFFICE VISIT (OUTPATIENT)
Dept: FAMILY MEDICINE CLINIC | Facility: CLINIC | Age: 65
End: 2025-01-10
Payer: COMMERCIAL

## 2025-01-10 VITALS
WEIGHT: 202.2 LBS | TEMPERATURE: 97.7 F | SYSTOLIC BLOOD PRESSURE: 138 MMHG | HEART RATE: 96 BPM | DIASTOLIC BLOOD PRESSURE: 86 MMHG | BODY MASS INDEX: 28.95 KG/M2 | OXYGEN SATURATION: 98 % | HEIGHT: 70 IN

## 2025-01-10 DIAGNOSIS — I10 PRIMARY HYPERTENSION: Primary | ICD-10-CM

## 2025-01-10 PROCEDURE — 99213 OFFICE O/P EST LOW 20 MIN: CPT | Performed by: NURSE PRACTITIONER

## 2025-01-10 NOTE — PROGRESS NOTES
"Name: Rory Tsai      : 1960      MRN: [id number]  Encounter Provider: YESENIA Elmore  Encounter Date: 1/10/2025   Encounter department: [facility] [address]  :  Assessment & Plan  Primary hypertension  Blood pressure managed in office today.  Advised patient to begin monitoring blood pressure at home as previously educated.  To continue on current dose of lisinopril and amlodipine.  Return in 3 months for reevaluation.              History of Present Illness     Patient presents to the office for 4-week follow-up.  Patient's amlodipine was increased to 5 mg from 2.5 mg.  Has tolerated increase in medication without side effects.  Has not been monitoring blood pressures.  Denies headaches, dizziness, visual disturbances, chest pain, palpitations.      Review of Systems   Constitutional:  Negative for activity change, appetite change and fatigue.   HENT:  Negative for congestion, ear pain and sore throat.    Eyes:  Negative for photophobia and visual disturbance.   Respiratory:  Negative for cough, chest tightness and shortness of breath.    Cardiovascular:  Negative for chest pain and palpitations.   Gastrointestinal:  Negative for abdominal pain, nausea and vomiting.   Genitourinary:  Negative for decreased urine volume.   Musculoskeletal:  Negative for arthralgias and myalgias.   Skin:  Negative for color change and rash.   Neurological:  Negative for dizziness, light-headedness and headaches.   Hematological:  Negative for adenopathy.   Psychiatric/Behavioral:  Negative for confusion.        Objective   /86 (BP Location: Left arm, Patient Position: Sitting)   Pulse 96   Temp 97.7 °F (36.5 °C)   Ht 5' 10\" (1.778 m)   Wt 91.7 kg (202 lb 3.2 oz)   SpO2 98%   BMI 29.01 kg/m²      Physical Exam  Vitals reviewed.   Constitutional:       General: He is not in acute distress.     Appearance: Normal appearance. He is not ill-appearing.   HENT:      Head: " Normocephalic and atraumatic.      Right Ear: External ear normal.      Left Ear: External ear normal.      Nose: Nose normal.      Mouth/Throat:      Mouth: Mucous membranes are moist.      Pharynx: Oropharynx is clear.   Eyes:      Conjunctiva/sclera: Conjunctivae normal.      Pupils: Pupils are equal, round, and reactive to light.   Neck:      Vascular: No carotid bruit.   Cardiovascular:      Rate and Rhythm: Normal rate and regular rhythm.      Pulses: Normal pulses.      Heart sounds: Normal heart sounds. No murmur heard.  Pulmonary:      Effort: Pulmonary effort is normal.      Breath sounds: Normal breath sounds.   Musculoskeletal:         General: Normal range of motion.      Cervical back: Normal range of motion and neck supple.      Right lower leg: No edema.      Left lower leg: No edema.   Skin:     General: Skin is warm and dry.   Neurological:      General: No focal deficit present.      Mental Status: He is alert and oriented to person, place, and time.   Psychiatric:         Mood and Affect: Mood normal.         Behavior: Behavior normal.

## 2025-01-10 NOTE — ASSESSMENT & PLAN NOTE
Blood pressure managed in office today.  Advised patient to begin monitoring blood pressure at home as previously educated.  To continue on current dose of lisinopril and amlodipine.  Return in 3 months for reevaluation.

## 2025-03-09 DIAGNOSIS — I10 PRIMARY HYPERTENSION: ICD-10-CM

## 2025-03-10 RX ORDER — LISINOPRIL 40 MG/1
40 TABLET ORAL DAILY
Qty: 90 TABLET | Refills: 1 | Status: SHIPPED | OUTPATIENT
Start: 2025-03-10

## 2025-03-26 ENCOUNTER — OFFICE VISIT (OUTPATIENT)
Age: 65
End: 2025-03-26
Payer: COMMERCIAL

## 2025-03-26 VITALS
OXYGEN SATURATION: 97 % | WEIGHT: 195 LBS | HEART RATE: 96 BPM | TEMPERATURE: 98.1 F | BODY MASS INDEX: 27.92 KG/M2 | DIASTOLIC BLOOD PRESSURE: 90 MMHG | SYSTOLIC BLOOD PRESSURE: 140 MMHG | HEIGHT: 70 IN

## 2025-03-26 DIAGNOSIS — D22.9 MULTIPLE NEVI: ICD-10-CM

## 2025-03-26 DIAGNOSIS — L82.1 SEBORRHEIC KERATOSIS: ICD-10-CM

## 2025-03-26 DIAGNOSIS — D18.01 CHERRY ANGIOMA: ICD-10-CM

## 2025-03-26 DIAGNOSIS — Z85.828 HISTORY OF SKIN CANCER: ICD-10-CM

## 2025-03-26 DIAGNOSIS — Z13.89 SCREENING FOR SKIN CONDITION: Primary | ICD-10-CM

## 2025-03-26 PROCEDURE — 99213 OFFICE O/P EST LOW 20 MIN: CPT | Performed by: STUDENT IN AN ORGANIZED HEALTH CARE EDUCATION/TRAINING PROGRAM

## 2025-03-26 NOTE — PATIENT INSTRUCTIONS
"Treatment with Cryotherapy    The doctor has treated your skin with nitrogen, which is 320 degrees Fahrenheit below zero.  He has given the treated area \"frostbite.\"    Stinging should subside within a few hours.  You can take Tylenol for pain, if needed.    Over the next few days, it is normal if the area becomes reddened, a blood blister, or swollen with fluid.  If the lesion treated was near the eye - you could get a swollen eye over the next few days.  Do not panic!  This is all temporary, and will resolve with time.    There is no special treatment - just keep the area clean.  Makeup and BandAids can be used, if preferred.    When the area starts to dry up and peel off, using Vaseline can help healing.    It usually takes up to a month for it to heal.  Some lesions are recurrent and may require repeat treatments.  If a lesion has not healed in one month, please don't hesitate to contact us.      If you have any further questions that are not answered here, please call us.  [phone].    Thank you for allowing us to care for you.      ACTINIC KERATOSIS    Actinic keratoses are very common on sites repeatedly exposed to the sun, especially the backs of the hands and the face, most often affecting the ears, nose, cheeks, upper lip, vermilion of the lower lip, temples, forehead and balding scalp. In severely chronically sun-damaged individuals, they may also be found on the upper trunk, upper and lower limbs, and dorsum of feet.    We discussed the theoretical premalignant (“pre-cancerous”) nature and etiology of these growths.  We discussed the prevailing notion that actinic keratoses are a reflection of abnormal skin cell development due to DNA damage by short wavelength UVB.  They are more likely to appear if the immune function is poor, due to aging, recent sun exposure, predisposing disease or certain drugs.    We discussed that the main concern is that actinic keratoses may predispose to squamous cell " carcinoma. It is rare for a solitary actinic keratosis to evolve to squamous cell carcinoma (SCC), but the risk of SCC occurring at some stage in a patient with more than 10 actinic keratoses is thought to be about 10 to 15%. A tender, thickened, ulcerated or enlarging actinic keratosis is suspicious of SCC.    Actinic keratoses may be prevented by strict sun protection. If already present, keratoses may improve with a very high sun protection factor (50+) broad-spectrum sunscreen applied at least daily to affected areas, year-round.  We recommend that UPF-rated clothing and hats and sunglasses be worn whenever possible and that a sunscreen-moisturizer combination product such as Neutrogena Daily Defense be applied at least three times a day.    We performed a thorough discussion of treatment options and specific risk/benefits/alternatives including but not limited to medical “field” treatment with medications such as the following:    Topical “field area” medications such as 5-fluorouracil or Aldara (specifically, the trouble with long-term compliance, blistering and local skin reaction versus the convenience of at-home therapy and that field therapy “gets what is not yet seen”).    Cryotherapy (specifically, local pain, scarring, dyspigmentation, blistering, possible superinfection, and treats “only what we see” versus directed treatment today).    Photodynamic therapy (specifically, local pain, scarring, dyspigmentation, blistering, possible superinfection, need to schedule for a later date, and time spent in the office versus field therapy that “gets what is not yet seen”).      BASAL CELL CARCINOMA    What is basal cell carcinoma?  Basal cell carcinoma (BCC) is a common, locally invasive, keratinocytic, or non-melanoma, skin cancer. It is also known as rodent ulcer and basalioma. Patients with BCC often develop multiple primary tumours over time.    Who gets basal cell carcinoma?  Risk factors for BCC  include:  Age and sex: BCCs are particularly prevalent in elderly males. However, they also affect females and younger adults   Previous BCC or other form of skin cancer (squamous cell carcinoma, melanoma)   Sun damage (photoaging, actinic keratoses)   Repeated prior episodes of sunburn   Fair skin, blue eyes and blond or red hair--note; BCC can also affect darker skin types   Previous cutaneous injury, thermal burn, disease (eg cutaneous lupus, sebaceous naevus)   Inherited syndromes: BCC is a particular problem for families with basal cell naevus syndrome (Gorlin syndrome), Kusrr-Klryé-Gnnwnpkp syndrome, Rombo syndrome, Oley syndrome and xeroderma pigmentosum   Other risk factors include ionising radiation, exposure to arsenic, and immune suppression due to disease or medicines    What causes basal cell carcinoma?  The cause of BCC is multifactorial.  Most often, there are DNA mutations in the patched (PTCH) tumour suppressor gene, part of hedgehog signaling pathway   These may be triggered by exposure to ultraviolet radiation   Various spontaneous and inherited gene defects predispose to BCC    What are the clinical features of basal cell carcinoma?  BCC is a locally invasive skin tumour. The main characteristics are:  Slowly growing plaque or nodule   Skin coloured, pink or pigmented   Varies in size from a few millimetres to several centimetres in diameter   Spontaneous bleeding or ulceration  BCC is very rarely a threat to life. A tiny proportion of BCCs grow rapidly, invade deeply, and/or metastasise to local lymph nodes.    Types of basal cell carcinoma  There are several distinct clinical types of BCC, and over 20 histological growth patterns of BCC.  Nodular BCC  Most common type of facial BCC   Shiny or pearly nodule with a smooth surface   May have central depression or ulceration, so its edges appear rolled   Blood vessels cross its surface   Cystic variant is soft, with jelly-like contents    Micronodular, microcystic and infiltrative types are potentially aggressive subtypes   Also known as nodulocystic carcinoma  Superficial BCC  Most common type in younger adults   Most common type on upper trunk and shoulders   Slightly scaly, irregular plaque   Thin, translucent rolled border   Multiple microerosions  Morphoeaform BCC  Usually found in mid-facial sites   Waxy, scar-like plaque with indistinct borders   Wide and deep subclinical extension   May infiltrate cutaneous nerves (perineural spread)   Also known as morpheic, morphoeiform or sclerosing BCC  Basosquamous carcinoma  Mixed basal cell carcinoma (BCC) and squamous cell carcinoma (SCC)   Infiltrative growth pattern   Potentially more aggressive than other forms of BCC   Also known as basisquamous carcinoma and mixed basal-squamous cell carcinoma       Complications of basal cell carcinoma    Recurrent BCC  Recurrence of BCC after initial treatment is not uncommon. Characteristics of recurrent BCC often include:  Incomplete excision or narrow margins at primary excision   Morphoeic, micronodular, and infiltrative subtypes   Location on head and neck    Advanced BCC  Advanced BCCs are large, often neglected tumours.  They may be several centimetres in diameter   They may be deeply infiltrating into tissues below the skin   They are difficult or impossible to treat surgically    Metastatic BCC  Very rare   Primary tumour is often large, neglected or recurrent, located on head and neck, with aggressive subtype   May have had multiple prior treatments   May arise in site exposed to ionising radiation   Can be fatal    How is basal cell carcinoma diagnosed?  BCC is diagnosed clinically by the presence of a slowly enlarging skin lesion with typical appearance. The diagnosis and  by a diagnostic biopsy or following excision.  Some typical superficial BCCs on trunk and limbs are clinically diagnosed and have non-surgical treatment without  histology.    What is the treatment for primary basal cell carcinoma?  The treatment for a BCC depends on its type, size and location, the number to be treated, patient factors, and the preference or expertise of the doctor. Most BCCs are treated surgically. Long-term follow-up is recommended to check for new lesions and recurrence; the latter may be unnecessary if histology has reported wide clear margins.    Excision biopsy  Excision means the lesion is cut out and the skin stitched up.  Most appropriate treatment for nodular, infiltrative and morphoeic BCCs   Should include 3 to 5 mm margin of normal skin around the tumour   Very large lesions may require flap or skin graft to repair the defect   Pathologist will report deep and lateral margins   Further surgery is recommended for lesions that are incompletely excised    Mohs micrographically controlled excision  Mohs micrographically controlled surgery involves examining carefully marked excised tissue under the microscope, layer by layer, to ensure complete excision.  Very high cure rates achieved by trained Mohs surgeons   Used in high-risk areas of the face around eyes, lips and nose   Suitable for ill-defined, morphoeic, infiltrative and recurrent subtypes   Large defects are repaired by flap or skin graft    Superficial skin surgery  Superficial skin surgery comprises shave, curettage, and electrocautery. It is a rapid technique using local anaesthesia and does not require sutures.  Suitable for small, well-defined nodular or superficial BCCs   Lesions are usually located on trunk or limbs   Wound is left open to heal by secondary intention   Moist wound dressings lead to healing within a few weeks   Eventual scar quality variable    Cryotherapy  Cryotherapy is the treatment of a superficial skin lesion by freezing it, usually with liquid nitrogen.  Suitable for small superficial BCCs on covered areas of trunk and limbs   Best avoided for BCCs on head and  neck, and distal to knees   Double freeze-thaw technique   Results in a blister that crusts over and heals within several weeks.   Leaves permanent white suzette    Photodynamic therapy  Photodynamic therapy (PDT) refers to a technique in which BCC is treated with a photosensitising chemical, and exposed to light several hours later.  Topical photosensitisers include aminolevulinic acid lotion and methyl aminolevulinate cream   Suitable for low-risk small, superficial BCCs   Best avoided if tumour in site at high risk of recurrence   Results in inflammatory reaction, maximal 3-4 days after procedure   Treatment repeated 7 days after initial treatment   Excellent cosmetic results    Imiquimod cream  Imiquimod is an immune response modifier.  Best used for superficial BCCs less than 2 cm diameter   Applied three to five times each week, for 6-16 weeks   Results in a variable inflammatory reaction, maximal at three weeks   Minimal scarring is usual    Fluorouracil cream  5-Fluorouracil cream is a topical cytotoxic agent.  Used to treat small superficial basal cell carcinomas   Requires prolonged course, eg twice daily for 6-12 weeks   Causes inflammatory reaction   Has high recurrence rates    Radiotherapy  Radiotherapy or X-ray treatment can be used to treat primary BCCs or as adjunctive treatment if margins are incomplete.  Mainly used if surgery is not suitable   Best avoided in young patients and in genetic conditions predisposing to skin cancer   Best cosmetic results achieved using multiple fractions   Typically, patient attends once-weekly for several weeks   Causes inflammatory reaction followed by scar   Risk of radiodermatitis, late recurrence, and new tumours    What is the treatment for advanced or metastatic basal cell carcinoma?  Locally advanced primary, recurrent or metastatic BCC requires multidisciplinary consultation. Often a combination of treatments is used.  Surgery   Radiotherapy   Targeted  therapy  Targeted therapy refers to the hedgehog signalling pathway inhibitors, vismodegib and sonidegib. These drugs have some important risks and side effects.    How can basal cell carcinoma be prevented?  The most important way to prevent BCC is to avoid sunburn. This is especially important in childhood and early life. Fair skinned individuals and those with a personal or family history of BCC should protect their skin from sun exposure daily, year-round and lifelong.  Stay indoors or under the shade in the middle of the day   Wear covering clothing   Apply high protection factor SPF50+ broad-spectrum sunscreens generously to exposed skin if outdoors   Avoid indoor tanning (sun beds, solaria)  Oral nicotinamide (vitamin B3) in a dose of 500 mg twice daily may reduce the number and severity of BCCs.    What is the outlook for basal cell carcinoma?  Most BCCs are cured by treatment. Cure is most likely if treatment is undertaken when the lesion is small.  About 50% of people with BCC develop a second one within 3 years of the first. They are also at increased risk of other skin cancers, especially melanoma. Regular self-skin examinations and long-term annual skin checks by an experienced health professional are recommended.        SQUAMOUS CELL CARCINOMA    What is cutaneous squamous cell carcinoma?  Cutaneous squamous cell carcinoma (SCC) is a common type of keratinocyte or non-melanoma skin cancer. It is derived from cells within the epidermis that make keratin -- the horny protein that makes up skin, hair and nails.  Cutaneous SCC is an invasive disease, referring to cancer cells that have grown beyond the epidermis. SCC can sometimes metastasise and may prove fatal.  Intraepidermal carcinoma (cutaneous SCC in situ) and mucosal SCC are considered elsewhere.    Who gets cutaneous squamous cell carcinoma?  Risk factors for cutaneous SCC include:  Age and sex: SCCs are particularly prevalent in elderly males.  However, they also affect females and younger adults.   Previous SCC or another form of skin cancer (basal cell carcinoma, melanoma) are a strong predictor for further skin cancers.   Actinic keratoses   Outdoor occupation or recreation   Smoking   Fair skin, blue eyes and blond or red hair   Previous cutaneous injury, thermal burn, disease (eg cutaneous lupus, epidermolysis bullosa, leg ulcer)   Inherited syndromes: SCC is a particular problem for families with xeroderma pigmentosum and albinism   Other risk factors include ionising radiation, exposure to arsenic, and immune suppression due to disease (eg chronic lymphocytic leukaemia) or medicines. Organ transplant recipients have a massively increased risk of developing SCC.    What causes cutaneous squamous cell carcinoma?  More than 90% of cases of SCC are associated with numerous DNA mutations in multiple somatic genes. Mutations in the p53 tumour suppressor gene are caused by exposure to ultraviolet radiation (UV), especially UVB (known as signature 7). Other signature mutations relate to cigarette smoking, ageing and immune suppression (eg, to drugs such as azathioprine). Mutations in signalling pathways affect the epidermal growth factor receptor, CARYN, Fyn, and [id number] signalling.   Beta-genus human papillomaviruses (wart virus) are thought to play a role in SCC arising in immune-suppressed populations. ?-HPV and HPV subtypes 5, 8, 17, 20, 24, and 38 have also been associated with an increased risk of cutaneous SCC in immunocompetent individuals.     What are the clinical features of cutaneous squamous cell carcinoma?  Cutaneous SCCs present as enlarging scaly or crusted lumps. They usually arise within pre-existing actinic keratosis or intraepidermal carcinoma.  They grow over weeks to months   They may ulcerate   They are often tender or painful   Located on sun-exposed sites, particularly the face, lips, ears, hands, forearms and lower legs   Size  varies from a few millimetres to several centimetres in diameter.    Types of cutaneous squamous cell carcinoma  Distinct clinical types of invasive cutaneous SCC include:  Cutaneous horn -- the horn is due to excessive production of keratin   Keratoacanthoma (KA) -- a rapidly growing keratinising nodule that may resolve without treatment   Carcinoma cuniculatum (‘verrucous carcinoma’), a slow-growing, warty tumour on the sole of the foot.   Multiple eruptive SCC/KA-like lesions arising in syndromes, such as multiple self-healing squamous epitheliomas of Rodriguez-Smith and Grzybowski syndrome  The pathologist may classify a tumour as well differentiated, moderately well differentiated, poorly differentiated or anaplastic cutaneous SCC. There are other variants.    Classification of squamous cell carcinoma by risk  Cutaneous SCC is classified as low-risk or high-risk, depending on the chance of tumour recurrence and metastasis. Characteristics of high-risk SCC include:  High-risk cutaneous squamous cell carcinoma has the following characteristics:  Diameter greater than or equal to 2 cm   Location on the ear, vermilion of the lip, central face, hands, feet, genitalia   Arising in elderly or immune suppressed patient   Histological thickness greater than 2 mm, poorly differentiated histology, or with the invasion of the subcutaneous tissue, nerves and blood vessels  Metastatic SCC is found in regional lymph nodes (80%), lungs, liver, brain, bones and skin.    Staging cutaneous squamous cell carcinoma  In 2011, the American Joint Committee on Cancer (AJCC) published a new staging systemic for cutaneous SCC for the 7th Edition of the AJCC manual. This evaluates the dimensions of the original primary tumour (T) and its metastases to lymph nodes (N).    Tumour staging for cutaneous SCC  TX: Th Primary tumour cannot be assessed  T0: No evidence of a primary tumour  Tis: Carcinoma in situ  T1: Tumour <= 2cm without  high-risk features  T2: Tumour >= 2cm; or; Tumour <= 2 cm with high-risk features  T3: Tumour with the invasion of maxilla, mandible, orbit or temporal bone  T4: Tumour with the invasion of axial or appendicular skeleton or perineural invasion of skull base    Tung staging for cutaneous SCC  NX: Regional lymph nodes cannot be assessed  N0: No regional lymph node metastasis  N1: Metastasis in one local lymph node <= 3cm  N2: Metastasis in one local lymph node >= 3cm; or; Metastasis in >1 local lymph node <= 6cm  N3: Metastasis in lymph node >= 6cm    How is squamous cell carcinoma diagnosed?  Diagnosis of cutaneous SCC is based on clinical features. The diagnosis and histological subtype are confirmed pathologically by diagnostic biopsy or following excision. See squamous cell carcinoma - pathology.  Patients with high-risk SCC may also undergo staging investigations to determine whether it has spread to lymph nodes or elsewhere. These may include:  Imaging using ultrasound scan, X-rays, CT scans, MRI scans   Lymph node or other tissue biopsies    What is the treatment for cutaneous squamous cell carcinoma?  Cutaneous SCC is nearly always treated surgically. Most cases are excised with a 3-10 mm margin of normal tissue around a visible tumour. A flap or skin graft may be needed to repair the defect.  Other methods of removal include:  Shave, curettage, and electrocautery for low-risk tumours on trunk and limbs   Aggressive cryotherapy for very small, thin, low-risk tumours   Mohs micrographic surgery for large facial lesions with indistinct margins or recurrent tumours   Radiotherapy for an inoperable tumour, patients unsuitable for surgery, or as adjuvant    What is the treatment for advanced or metastatic squamous cell carcinoma?  Locally advanced primary, recurrent or metastatic SCC requires multidisciplinary consultation. Often a combination of treatments is used.  Surgery   Radiotherapy   Cemiplimab    Experimental targeted therapy using epidermal growth factor receptor inhibitors    How can cutaneous squamous cell carcinoma be prevented?  There is a great deal of evidence to show that very careful sun protection at any time of life reduces the number of SCCs. This is particularly important in ageing, sun-damaged, fair skin; in patients that are immune suppressed; and in those who already have actinic keratoses or previous SCC.  Stay indoors or under the shade in the middle of the day   Wear covering clothing   Apply high protection factor SPF50+ broad-spectrum sunscreens generously to exposed skin if outdoors   Avoid indoor tanning (sun beds, solaria)    Oral nicotinamide (vitamin B3) in a dose of 500 mg twice daily may reduce the number and severity of SCCs in people at high risk.  Patients with multiple squamous cell carcinomas may be prescribed an oral retinoid (acitretin or isotretinoin). These reduce the number of tumours but have some nuisance side effects.    What is the outlook for cutaneous squamous cell carcinoma?  Most SCCs are cured by treatment. A cure is most likely if treatment is undertaken when the lesion is small. The risk of recurrence or disease-associated death is greater for tumours that are > 20 mm in diameter and/or > 2 mm in thickness at the time of surgical excision.  About 50% of people at high risk of SCC develop a second one within 5 years of the first. They are also at increased risk of other skin cancers, especially melanoma. Regular self-skin examinations and long-term annual skin checks by an experienced health professional are recommended.

## 2025-03-26 NOTE — PROGRESS NOTES
"[facility] Note     Patient Name: Rory Tsai  Encounter Date: 03/26/25     Have you been cared for by a [facility] Dermatologist in the last 3 years and, if so, which description applies to you?    Yes.  I have been here within the last 3 years, and my medical history has NOT changed since that time.  I am MALE/not capable of bearing children.    REVIEW OF SYSTEMS:  Have you recently had or currently have any of the following? No changes in my recent health.   PAST MEDICAL HISTORY:  Have you personally ever had or currently have any of the following?  If \"YES,\" then please provide more detail. No changes in my medical history.   HISTORY OF IMMUNOSUPPRESSION: Do you have a history of any of the following:  Systemic Immunosuppression such as Diabetes, Biologic or Immunotherapy, Chemotherapy, Organ Transplantation, Bone Marrow Transplantation or Prednisone?  No     Answering \"YES\" requires the addition of the dotphrase \"IMMUNOSUPPRESSED\" as the first diagnosis of the patient's visit.   FAMILY HISTORY:  Any \"first degree relatives\" (parent, brother, sister, or child) with the following?    No changes in my family's known health.   PATIENT EXPERIENCE:    Do you want the Dermatologist to perform a COMPLETE skin exam today including a clinical examination under the \"bra and underwear\" areas?  Yes  If necessary, do we have your permission to call and leave a detailed message on your Preferred Phone number that includes your specific medical information?  Yes      No Known Allergies   Current Outpatient Medications:   •  amLODIPine (NORVASC) 5 mg tablet, Take 1 tablet (5 mg total) by mouth daily, Disp: 90 tablet, Rfl: 1  •  atorvastatin (LIPITOR) 20 mg tablet, Take 1 tablet (20 mg total) by mouth every evening, Disp: 90 tablet, Rfl: 1  •  Blood Glucose Monitoring Suppl (OneTouch Verio Reflect) w/Device KIT, Check blood sugars once daily. Please substitute with appropriate alternative as covered by " patient's insurance. Dx: E11.65, Disp: 1 kit, Rfl: 0  •  famotidine (PEPCID) 20 mg tablet, Take 1 tablet (20 mg total) by mouth daily, Disp: 90 tablet, Rfl: 1  •  gabapentin (NEURONTIN) 300 mg capsule, Take 1 capsule (300 mg total) by mouth 3 (three) times a day, Disp: 270 capsule, Rfl: 1  •  glimepiride (AMARYL) 4 mg tablet, TAKE ONE TABLET BY MOUTH EVERY DAY WITH BREAKFAST, Disp: 100 tablet, Rfl: 1  •  glucose blood (OneTouch Verio) test strip, Check blood sugars once daily. Please substitute with appropriate alternative as covered by patient's insurance. Dx: E11.65, Disp: 100 each, Rfl: 3  •  lisinopril (ZESTRIL) 40 mg tablet, TAKE 1 TABLET (40 MG TOTAL) BY MOUTH DAILY., Disp: 90 tablet, Rfl: 1  •  OneTouch Delica Lancets 33G MISC, Check blood sugars once daily. Please substitute with appropriate alternative as covered by patient's insurance. Dx: E11.65, Disp: 100 each, Rfl: 3  •  meloxicam (Mobic) 15 mg tablet, Take 1 tablet (15 mg total) by mouth daily (Patient not taking: Reported on 3/26/2025), Disp: 30 tablet, Rfl: 1          Whom besides the patient is providing clinical information about today's encounter?   NO ADDITIONAL HISTORIAN (patient alone provided history)    Physical Exam and Assessment/Plan by Diagnosis:    Chief complaint: Patient is a 63 y/o male present for a routine skin exam with history of non-melanoma skin cancer.    HISTORY OF BASAL CELL CARCINOMA    Physical Exam:  Anatomic Location Affected:  Right Back - 08/2024  Morphological Description of scar:  well healed  Suspected Recurrence: No  Pertinent Positives:  Pertinent Negatives:      Additional History of Present Condition:  History of basal cell carcinoma with no sign of recurrence. ED&C done on 09/10/2024 by Dr. Billingsley,     Assessment and Plan:  Based on a thorough discussion of this condition and the management approach to it (including a comprehensive discussion of the known risks, side effects and potential benefits of  "treatment), the patient (family) agrees to implement the following specific plan:  When outside we recommend using a wide brim hat, sunglasses, long sleeve and pants, sunscreen with SPF 30+ with reapplication every 2 hours, or SPF specific clothing   Monitor for change    HISTORY OF SQUAMOUS CELL CARCINOMA     Physical Exam:  Anatomic Location Affected:  Occipital Scalp (in-situ) - 08/2024  Morphological Description of Scar:  well healed  Suspected Recurrence: no  Regional adenopathy: no    Additional History of Present Condition:  ED&C done on 09/10/2024 by Dr. Billingsley    Assessment and Plan:  Based on a thorough discussion of this condition and the management approach to it (including a comprehensive discussion of the known risks, side effects and potential benefits of treatment), the patient (family) agrees to implement the following specific plan:  When outside we recommend using a wide brim hat, sunglasses, long sleeve and pants, sunscreen with SPF 30+ with reapplication every 2 hours, or SPF specific clothing   Monitor for change    MELANOCYTIC NEVI (\"Moles\")    Physical Exam:  Anatomic Location Affected: Mostly on sun-exposed areas of the trunk and extremities  Morphological Description:  Scattered, 1-4mm round to ovoid, symmetrical-appearing, even bordered, skin colored to dark brown macules/papules, mostly in sun-exposed areas  Pertinent Positives:  Pertinent Negatives:    Additional History of Present Condition:  present on exam    Assessment and Plan:  Based on a thorough discussion of this condition and the management approach to it (including a comprehensive discussion of the known risks, side effects and potential benefits of treatment), the patient (family) agrees to implement the following specific plan:  Provided handout with information regarding the ABCDE's of moles   Recommend routine skin exams every 6 months   Sun avoidance, protective clothing (known as UPF clothing), and the use of at least " "SPF 30 sunscreens is advised. Sunscreen should be reapplied every two hours when outside.     SEBORRHEIC KERATOSIS; NON-INFLAMED    Physical Exam:  Anatomic Location Affected:  scattered across trunk, extremities, face  Morphological Description:  Flat and raised, waxy, smooth to warty textured, yellow to brownish-grey to dark brown to blackish, discrete, \"stuck-on\" appearing papules.  Pertinent Positives:  Pertinent Negatives:    Additional History of Present Condition:  Patient reports new bumps on the skin.  Denies itch, burn, pain, bleeding or ulceration.  Present constantly; nothing seems to make it worse or better.  No prior treatment.      Assessment and Plan:  Based on a thorough discussion of this condition and the management approach to it (including a comprehensive discussion of the known risks, side effects and potential benefits of treatment), the patient (family) agrees to implement the following specific plan:  Reassured benign    ANGIOMA (\"CHERRY ANGIOMA\")    Physical Exam:  Anatomic Location: scattered across sun exposed areas of the trunk and extremities   Morphologic Description: Firm red to reddish-blue discrete papules  Pertinent Positives:  Pertinent Negatives:    Additional History of Present Condition:  Present on exam.     Assessment and Plan:  Reassured benign    Scribe Attestation    I,:  Yvonne Brock MA am acting as a scribe while in the presence of the attending physician.:       I,:  Alek Addison DO personally performed the services described in this documentation    as scribed in my presence.:                 "

## 2025-04-04 NOTE — PROGRESS NOTES
"PT Evaluation     Today's date: 2024  Patient name: Rory Tsai  : 1960  MRN: [id number]  Referring provider: Krystal Coleman*  Dx:   Encounter Diagnosis     ICD-10-CM    1. Arthritis of left hip  M16.12 Ambulatory Referral to Physical Therapy      2. Hamstring tightness of both lower extremities  M62.9 Ambulatory Referral to Physical Therapy      3. Hip strain, left, subsequent encounter  S76.012D       4. Hip strain, left, initial encounter  S76.012A Ambulatory Referral to Physical Therapy                     Assessment  Assessment details: The patient is a 62 y/o male who presents to PT with diagnosis of L hip arthritis, BLE hamstring tightness and L hip strain.  He has complaints of intermittent pain along his anterior L hip.  Pain is present when going from crouched position (such as squatting down when working or having his hip flexed for an extended time) and then going to standing position.  Pain last about 30\" and he has a hard time putting weight through his foot, then clears up.  He has slight decreased ROM and strength and decreased flexibility.  He demonstrates deficits with decreased ROM and strength, decreased flexibility, decreased balance and proprioception and pain with transitions.  He ambulates without significant gait deviation and can go up and down the steps with reciprocal gait pattern.  No TTP is noted L hip, he demonstrates (-) JUAN C test.  The patient would benefit from continued PT to address deficits and improve function.  Tx to include ROM, stretching, strengthening, modalities, HEP, pt education, postural ed, lifting/body mechanics, neuro re-ed, balance/proprioception Te, MT and equipment.        Impairments: abnormal or restricted ROM, activity intolerance, impaired balance, impaired physical strength, lacks appropriate home exercise program and pain with function  Other impairment: decreased flexibility  Understanding of Dx/Px/POC: good   Prognosis: " good    Goals  STGs:  1.  Initiate and complete HEP with verbal cues.  2.  Improve L hip ROM by 5-10 degrees in 4 weeks.  3.  Decrease L hip pain by > 25% in 4 weeks.  LTGs:  1.  Patient to be I with HEP in 6 weeks.  2.  Improve L Hip ROM to WNL t/o in 6 weeks to improve function.  3.  Improve L hip strength to 5/5 t/o in 6 weeks to improve function.  4.  Decrease L hip pain to < or = to 1-2/10 with transitions in 6 weeks to improve function.   5.  Recreational performance is improved to PLOF in 6 weeks.  6.  Work performance is improved to maximal level of function in 6 weeks.  7.  Patient will transition from crouched position (prolonged hip flexion) to standing without pain in 6 weeks to complete job duties more efficiently.           Plan  Plan details: Modalities and therapy interventions prn.    Patient would benefit from: skilled physical therapy  Planned modality interventions: cryotherapy and thermotherapy: hydrocollator packs  Planned therapy interventions: IASTM, balance, balance/weight bearing training, neuromuscular re-education, manual therapy, patient education, postural training, self care, flexibility, functional ROM exercises, gait training, home exercise program, stretching, strengthening, therapeutic activities, therapeutic exercise, abdominal trunk stabilization and joint mobilization  Frequency: 2x week  Duration in weeks: 6  Plan of Care beginning date: 1/22/2024  Plan of Care expiration date: 3/4/2024  Treatment plan discussed with: patient      Subjective Evaluation    History of Present Illness  Mechanism of injury: The patient states that he had developed pain in January 2023.  He had been in PT from January to June 2023 for LBP, drop foot and nerve injury.  He feels that this hip pain developed around the same time as this issue.      He had gone to see his spine doctor the beginning of December.  He had x-rays taken and was referred to sports medicine doctor for further evaluation.    He  "had seen Dr. Coleman on 12/21/23.  No other imaging was performed.  Per patient the doctor had checked his back and leg, did not feet like it was a problem with his back or hip previous injury.  \"The doctor felt like I needed a tune up.\"  He was referred to OPPT and he now presents for his evaluation.   He will be going back to see the doctor as needed for his follow up appointment.     From EMR review of Dr. Coleman office visit note 12/21/23:   63-year-old active male presents for evaluation of left hip/groin pain more than 1 year duration.  He denies any particular trauma or inciting event.  Pain described as gradual in onset, localized to left anterior hip/groin area, none radiating, achy and sore and sometimes sharp, worse when rising from prolonged flexed position, and improved with stretching and warm up activity.  He states that when he is working in crouched position for prolonged duration and then attempt to stand he experiences the pain, but after stretching for few seconds symptoms improved and he is able to continue being physically active.  He does not have any issue with getting in and out of vehicles. Does not usually take medication for symptoms.  He had follow-up appointment with pain management.   He was referred for imaging studies and referred to sports medicine.    From EMR review of x-rays from 12/8/23:  FINDINGS:  There is no acute fracture or dislocation.  Mild left hip osteoarthritis is seen.  No lytic or blastic osseous lesion.  Soft tissues are unremarkable.  The visualized lumbar spine is unremarkable.  IMPRESSION:  No acute osseous abnormality.  Degenerative changes as described.    The patient states that he has L hip pain after being in \"compressed\" position, such as being down in a crouched position or when kneeling.  Once he stands, has pain about 30\" then it goes away.  He does have h/o neuropathy into LLE, taking Gabapentin.      He notes that his L foot does turn out to the " "side when walking.    Feels his pain has been getting better but has not completely gone away.  He has been doing stretching at home though not consistently.      Quality of life: good    Patient Goals  Patient goals for therapy: increased motion, decreased pain and increased strength  Patient goal: \"To help decrease the pain in my hip.\"  Pain  At best pain ratin  At worst pain ratin  Location: L Hip - anterior hip in hip crease  Quality: dull ache, discomfort and sharp  Exacerbated by: getting up from crouched position.    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      Objective     Palpation   Left   No palpable tenderness to the iliopsoas.     Tenderness     Left Hip   No tenderness in the greater trochanter.     Neurological Testing     Sensation     Hip   Left Hip   Intact: light touch    Right Hip   Intact: light touch    Active Range of Motion   Left Hip   Flexion: 105 degrees   Extension: 35 degrees   External rotation (90/90): 40 degrees   Internal rotation (90/90): 20 degrees     Right Hip   Flexion: 108 degrees   Extension: 40 degrees   External rotation (90/90): 43 degrees   Internal rotation (90/90): 20 degrees   Left Knee   Flexion: WFL  Extension: WFL    Right Knee   Flexion: WFL  Extension: WFL    Additional Active Range of Motion Details  L SLR: 55  R SLR: 55    Strength/Myotome Testing     Left Hip   Planes of Motion   Flexion: 4+  Extension: 4+  Abduction: 4+  Adduction: 5  External rotation: 5  Internal rotation: 5    Right Hip   Normal muscle strength    Left Knee   Flexion: WFL  Extension: WFL    Right Knee   Flexion: WFL  Extension: WFL    Tests     Left Hip   Negative JUAN C and FADIR.     Ambulation     Ambulation: Level Surfaces   Ambulation without assistive device: independent    Ambulation: Stairs   Ascend " "stairs: independent  Pattern: reciprocal  Descend stairs: independent  Pattern: reciprocal    Observational Gait   Gait: within functional limits                 POC expires Unit limit Auth Expiration date PT/OT/ST + Visit Limit?   3/4/24 3/day  [facility] PCY                           Visit/Unit Tracking  AUTH Status:  Date 1/24              Auth required Used 1               Remaining                      Precautions: Type II DM, sciatica, h/o L foot drop       Manuals 1/22/24       L Hip - Hams, Piriformis, hip flexor                                Neuro Re-Ed         BOSU Lunges        SLS        Tandem Stance        Sidestepping        SLS on foam, ball toss at rebounder                        Ther Ex        Bike        Self Hams Stretch w/strap 30\"x3       Self Piriformis Stretch 30\"x3       Prone Quad stretch w/strap, roll under distal thigh 30\"x3       SLR x 3 on foam        LAQ        SLR        Bridges        S/L Hip Abd        Clamshells        Prone Hip Ext        Total Gym        Leg Press - DL & SL        Ther Activity        Stepups F/L        Stepdowns        Gait Training                        Modalities                                  Access Code: [email or web address]  URL: [email or web address]  Date: 01/22/2024  Prepared by: Elo    Exercises  - Supine Hamstring Stretch with Strap  - 1 x daily - 7 x weekly - 1 sets - 3 reps - 30\" hold  - Supine Piriformis Stretch with Foot on Ground  - 1 x daily - 7 x weekly - 1 sets - 3 reps - 30\" hold  - Supine Quadriceps Stretch with Strap on Table  - 1 x daily - 7 x weekly - 1 sets - 3 reps - 30\" hold  - Prone Quadriceps Stretch with Strap  - 1 x daily - 7 x weekly - 1 sets - 3 reps - 30\" hold  " Statement Selected

## 2025-04-07 ENCOUNTER — APPOINTMENT (OUTPATIENT)
Dept: LAB | Facility: HOSPITAL | Age: 65
End: 2025-04-07
Payer: COMMERCIAL

## 2025-04-07 ENCOUNTER — RESULTS FOLLOW-UP (OUTPATIENT)
Dept: FAMILY MEDICINE CLINIC | Facility: CLINIC | Age: 65
End: 2025-04-07

## 2025-04-07 DIAGNOSIS — E78.2 MIXED HYPERLIPIDEMIA: ICD-10-CM

## 2025-04-07 DIAGNOSIS — E11.9 TYPE 2 DIABETES MELLITUS WITHOUT COMPLICATION, WITHOUT LONG-TERM CURRENT USE OF INSULIN (HCC): ICD-10-CM

## 2025-04-07 LAB
ALBUMIN SERPL BCG-MCNC: 4.1 G/DL (ref 3.5–5)
ALP SERPL-CCNC: 61 U/L (ref 34–104)
ALT SERPL W P-5'-P-CCNC: 32 U/L (ref 7–52)
ANION GAP SERPL CALCULATED.3IONS-SCNC: 6 MMOL/L (ref 4–13)
AST SERPL W P-5'-P-CCNC: 20 U/L (ref 13–39)
BILIRUB SERPL-MCNC: 0.59 MG/DL (ref 0.2–1)
BUN SERPL-MCNC: 17 MG/DL (ref 5–25)
CALCIUM SERPL-MCNC: 9.2 MG/DL (ref 8.4–10.2)
CHLORIDE SERPL-SCNC: 103 MMOL/L (ref 96–108)
CHOLEST SERPL-MCNC: 152 MG/DL (ref ?–200)
CO2 SERPL-SCNC: 29 MMOL/L (ref 21–32)
CREAT SERPL-MCNC: 1.32 MG/DL (ref 0.6–1.3)
EST. AVERAGE GLUCOSE BLD GHB EST-MCNC: 180 MG/DL
GFR SERPL CREATININE-BSD FRML MDRD: 56 ML/MIN/1.73SQ M
GLUCOSE P FAST SERPL-MCNC: 210 MG/DL (ref 65–99)
HBA1C MFR BLD: 7.9 %
HDLC SERPL-MCNC: 57 MG/DL
LDLC SERPL CALC-MCNC: 72 MG/DL (ref 0–100)
POTASSIUM SERPL-SCNC: 4.5 MMOL/L (ref 3.5–5.3)
PROT SERPL-MCNC: 6.8 G/DL (ref 6.4–8.4)
SODIUM SERPL-SCNC: 138 MMOL/L (ref 135–147)
TRIGL SERPL-MCNC: 114 MG/DL (ref ?–150)

## 2025-04-07 PROCEDURE — 83036 HEMOGLOBIN GLYCOSYLATED A1C: CPT

## 2025-04-07 PROCEDURE — 80053 COMPREHEN METABOLIC PANEL: CPT

## 2025-04-07 PROCEDURE — 80061 LIPID PANEL: CPT

## 2025-04-07 PROCEDURE — 36415 COLL VENOUS BLD VENIPUNCTURE: CPT

## 2025-04-10 ENCOUNTER — OFFICE VISIT (OUTPATIENT)
Dept: FAMILY MEDICINE CLINIC | Facility: CLINIC | Age: 65
End: 2025-04-10
Payer: MEDICARE

## 2025-04-10 VITALS
HEIGHT: 70 IN | WEIGHT: 203 LBS | OXYGEN SATURATION: 98 % | RESPIRATION RATE: 18 BRPM | BODY MASS INDEX: 29.06 KG/M2 | SYSTOLIC BLOOD PRESSURE: 132 MMHG | HEART RATE: 90 BPM | DIASTOLIC BLOOD PRESSURE: 88 MMHG

## 2025-04-10 DIAGNOSIS — E78.2 MIXED HYPERLIPIDEMIA: Primary | ICD-10-CM

## 2025-04-10 DIAGNOSIS — I10 PRIMARY HYPERTENSION: ICD-10-CM

## 2025-04-10 DIAGNOSIS — E11.9 TYPE 2 DIABETES MELLITUS WITHOUT COMPLICATION, WITHOUT LONG-TERM CURRENT USE OF INSULIN (HCC): ICD-10-CM

## 2025-04-10 PROCEDURE — 99214 OFFICE O/P EST MOD 30 MIN: CPT | Performed by: NURSE PRACTITIONER

## 2025-04-10 PROCEDURE — G2211 COMPLEX E/M VISIT ADD ON: HCPCS | Performed by: NURSE PRACTITIONER

## 2025-04-10 RX ORDER — GLIMEPIRIDE 4 MG/1
4 TABLET ORAL 2 TIMES DAILY WITH MEALS
Qty: 180 TABLET | Refills: 1 | Status: SHIPPED | OUTPATIENT
Start: 2025-04-10

## 2025-04-10 NOTE — ASSESSMENT & PLAN NOTE
Lab Results   Component Value Date    HGBA1C 7.9 (H) 04/07/2025     A1c has increased from previous reading.  Patient admits to being sedentary, began drinking beer again.  Discussed with patient importance of monitoring carbohydrate and sugar intake.  To limit/decrease alcohol intake, to increase physical activity as tolerated.  Discussed initiation of GLP-1, such as Ozempic, to help promote decrease in CVD risk.  Per patient, does not feel he can self inject medications.  And does not want additional therapy at this time.  Patient states he will work on physical activity and making healthier food choices.  Advised patient to repeat blood work in 3 months.    Orders:    Hemoglobin A1C; Future    Comprehensive metabolic panel; Future    glimepiride (AMARYL) 4 mg tablet; Take 1 tablet (4 mg total) by mouth 2 (two) times a day with meals

## 2025-04-10 NOTE — ASSESSMENT & PLAN NOTE
Recent lipid panel reviewed with patient.  Patient restarted atorvastatin as previously prescribed.  Lipid panel much improved from previous reading.  Encouraged heart healthy diet, physical activity as tolerated.  Will continue on atorvastatin 20 mg as prescribed.

## 2025-04-10 NOTE — ASSESSMENT & PLAN NOTE
Blood pressure controlled in office today.  Encouraged heart healthy diet, weight management, physical activity as tolerated.  To continue on lisinopril 40 mg and amlodipine 5 mg as prescribed.

## 2025-04-10 NOTE — PATIENT INSTRUCTIONS
"Patient Education     Carb counting for adults with diabetes   The Basics   Written by the doctors and editors at [facility]   What is carb counting? -- This is a type of meal planning that many people with diabetes use. It is a way to figure out how many carbohydrates, or \"carbs,\" you eat.  The body breaks down the food we eat into 3 main types of nutrients: carbs, proteins, and fats. Carbs are sugars and starches that come from food. The body uses carbs for energy.  Why do I need to count carbs? -- People with diabetes need to pay attention to how many carbs they eat. This is because carbs raise your blood sugar level.  Carb counting helps you:   Choose the right amount of insulin to take before meals and snacks - If you take insulin before meals, the dose depends on several things, including how many carbs you plan to eat. (It also depends on how much you plan to exercise and your blood sugar level.)   Plan your meals and snacks for the day - You can use carb counting to figure out how many carbs to eat at each meal and snack. This helps you make sure that you eat the right amount over the entire day.   Keep your blood sugar levels well managed - Spreading out the carbs you eat over a whole day can help keep your blood sugar from getting too high. If you take insulin or another diabetes medicine that can cause low blood sugar, eating about the same amount of carbs at each meal every day also helps keep your blood sugar from getting too low. Reducing the amount of carbs you eat can help you manage your diabetes better and prevent medical problems that diabetes can cause.  Your doctor, nurse, or dietitian (food expert) can help you figure out how many carbs to try to eat each day. This will depend on your eating habits, weight, activity level, and which diabetes medicines you take.  People who take insulin before meals might need to be very careful when they count the carbs in every meal and snack. This is so they " "can give themselves the right amount of insulin. If the insulin dose doesn't match the amount of carbs, their blood sugar might get too low or too high. Other people might be able to be a little more flexible as long as they get about the same amount of carbs at each meal or throughout the day.  Which foods have carbs? -- Foods with a lot of carbs include:   Grains - These include bread, pasta, rice, and cereal.   Fruits and starchy vegetables - Starchy vegetables include potatoes, corn, and squash.   Milk and other dairy products - Dairy products include cheese and yogurt.   Foods with added sugar - These include sweets and baked goods likes cookies and cakes, as well as sugary drinks like juice and soda.  It is best to get most of your carbs from fruits, vegetables, whole grains (like whole-wheat bread, whole-grain cereals, and brown rice), and low-fat milk and dairy products.  How do I count carbs? -- To count carbs in packaged foods, check the food's nutrition label (if it has one).  On the label (figure 1), check for:   \"Total Carbohydrate\" number - This tells you how many carbs are in 1 serving size of the food. If you eat 1 serving, then the number of carbs you eat is the same as the number of total carbohydrates.   \"Serving size\" - This tells you how much food is in 1 serving. If you have 2 servings, the number of carbs will be 2 times the number of carbohydrates listed.   \"Dietary Fiber\" - Fiber is a carb that is not digested, which means that it does not raise blood sugar. Foods with a lot of fiber can help manage your blood sugar. If a food has more than 5 grams (g) of fiber, you need less insulin to cover the total carbs in that food. So, if you are calculating an insulin dose, only count the carbs that are not from fiber (figure 1).  What is exchange planning? -- Exchange planning, or the \"exchange system,\" is a way for people to plan their meals without reading labels. This can be helpful since many " "foods don't come with a nutrition label.  The exchange system involves knowing how much of different foods have about 15 grams of carbs (table 1 and table 2 and table 3). Your doctor, nurse, or dietitian gives you a certain number of \"carb choices\" to eat with each meal and snack (table 4). Each \"choice\" is a portion of food that has about 15 grams of carbs. Knowing your options makes it easier to \"exchange\" 1 carb choice for another as you plan your meals and snacks. For example, 1 small apple could be exchanged for 1/3 cup of pasta.  How can I plan my meals? -- First, make sure that you know how many carbs you should be eating each day. Ask your doctor, nurse, or dietitian if you are not sure.  Here are some tips that might help:   Spread out your carbs over 4 to 6 small meals each day instead of 3 big ones.   Eat a similar number of carbs at each meal, for example, at each dinner.   Eat your meals at a similar time each day.   Plan your meals ahead of time.   Use the \"plate method.\" This is a simpler way to make sure that you get a good balance of carbs and other nutrients with each meal. It is not as exact as counting all of your carbs, but it can be helpful for people who prefer a simpler approach. If you take insulin before meals, it is generally better to adjust your insulin dose by counting how many carbs you plan to eat or using the exchange planning strategy.  For the plate method, you start with a plate about 9 inches (23 cm) across. Fill it with (figure 2):   1/2 non-starchy vegetables   1/4 protein   1/4 carbs   Follow your doctor's instructions for how and when to check your blood sugar. This can help you learn how certain foods affect your blood sugar.   Keep track of your meals and blood sugar levels. Show this to your doctor or nurse so they can adjust your treatment if needed. If you take insulin, you will also need to keep track of your exercise patterns and how much insulin you give yourself with " "each dose.   If you take insulin, make sure that you understand how to use it. This includes knowing how to adjust the dose based on your blood sugar level and what you plan to eat. Foods that have a lot of protein or fat also can affect your blood sugar level. Some people need to adjust their insulin doses when they eat these foods.   Remember that other things besides carbs can raise or lower your blood sugar level. These things can include exercise, getting sick, drinking alcohol, traveling, and stress. If you take insulin, make sure that you know how and when to adjust your dose in these situations.  If you are having trouble counting carbs or managing your blood sugar, talk to your doctor or nurse. They can help. A dietitian can also help you plan specific menus that will give you the right amount of carbs each day.  For more information, you can also get a book on counting carbs or check the American Diabetes Association website (www.diabetes.org).  All topics are updated as new evidence becomes available and our peer review process is complete.  This topic retrieved from [employer] on: Mar 27, 2024.  Topic 39138 Version 11.0  Release: 32.2.4 - C32.85  © 2024 UpToDate, Inc. and/or its affiliates. All rights reserved.  figure 1: Counting carbohydrates     To figure out the \"carb count\" in 1 serving, start with the number of grams of total carbohydrates (46 grams), then subtract the number of grams of dietary fiber (7 grams). It's also important to look at the serving size. In this example, the carb count is 39 grams. You can use this number when counting carbs for your insulin dose.  Graphic 53187 Version 8.0  table 1: Bread and grains with 15 grams of carbs*  Bread    Food  Serving size    Bagel 1/4 large bagel (1 oz)   Biscuit 1 biscuit (2.5 inches across)   Bread, reduced calorie, light 2 slices (1.5 oz)   Cornbread 1.75 inch cube (1.5 oz)   English muffin 1/2 muffin   Hot dog or hamburger bun 1/2 bun (3/4 oz) " "  Naan, chapati, or roti 1 oz   Pancake 1 pancake (4 inches across, 1/4 inch thick)   Kerline (6 inches across) 1/2 kerline   Tortilla, corn 1 small tortilla (6 inches across)   Tortilla, flour (white or whole wheat) 1 small tortilla (6 inches across) or 1/3 large tortilla (10 inches across)   Waffle 1 waffle (4-inch square or 4 inches across)   Cereals and grains (including pasta and rice)    Food  Serving size (cooked)    Barley, couscous, millet, pasta (white or whole wheat, all shapes and sizes), polenta, quinoa (all colors), or rice (white, brown, and other colors and types) 1/3 cup   Bran cereal (twigs, buds, or flakes), shredded wheat (plain), or sugar-coated cereal 1/2 cup   Bulgur, kasha, tabbouleh (tabouli), or wild rice 1/2 cup   Granola cereal 1/4 cup   Hot cereal (oats, oatmeal, grits) 1/2 cup   Unsweetened, ready-to-eat cereal 3/4 cup   * For bread and grains, 15 grams of carbs is considered 1 serving or \"choice\" for people who need to count carbs.  Graphic 790387 Version 1.0  table 2: Fruits with 15 grams of carbs*  Food  Serving size    Applesauce, unsweetened 1/2 cup   Banana 1 extra small banana, about 4 inches long (4 oz)   Blueberries 3/4 cup   Dried fruits (blueberries, cherries, cranberries, mixed fruit, raisins) 2 tbsp   Fruit, canned 1/2 cup   Fruit, whole, small (apple) 1 small fruit (4 oz)   Fruit, whole, medium (nectarine, orange, pear, tangerine) 1 medium fruit (6 oz)   Fruit juice, unsweetened 1/2 cup   Grapes 17 small grapes (3 oz)   Melon, diced 1 cup   Strawberries, whole 1 and 1/4 cups   When listed, weight (oz) includes skin and seeds. If you are not sure if your fruit is the right size for 1 serving, you can use a food scale to check the weight.  * For fruits, 15 grams of carbs is considered 1 serving or \"choice\" for people who need to count carbs.  Graphic 151058 Version 1.0  table 3: Starchy vegetables with 15 grams of carbs*  Food  Serving size (cooked)    Cassava, dasheen, or " "plantain 1/3 cup   Corn, green peas, mixed vegetables, or parsnips 1/2 cup   Marinara, pasta, or spaghetti sauce 1/2 cup   Mixed vegetables (with corn or peas) 1 cup   Potato, baked with skin 1/4 large (3 oz)   Potato, Macedonian-fried (oven-baked) 1 cup (2 oz)   Potato, mashed with milk and fat 1/2 cup   Squash, winter (acorn, butternut) 1 cup   Yam or sweet potato, plain 1/2 cup (3 and 1/2 oz)   If you are not sure if your vegetable is the right size for 1 serving, you can use a food scale to check the weight.  * For starchy vegetables, 15 grams of carbs is considered 1 serving or \"choice\" for people who need to count carbs.  Graphic 560700 Version 1.0  table 4: Sample exchange system meal plan  Time  Exchange pattern  Sample menu  Carbohydrate count (g)    8 am 3 carbohydrate group    2 starch 1 English muffin 30    1 fruit 1 1/4 c strawberries 15    1 protein group 1/4 c cottage cheese -    1 fat group 1 tsp margarine -      Total: 45    12 noon 4 carbohydrate group    2 starch 2 slices of bread 30    1 fruit 1 orange 15    1 vegetable 1 c salad -    1 milk 8 oz skim milk 12    3 protein group 3 oz chicken -    1 fat group 1 tbsp low fat grigsby -      Total: 57    3 pm 1 carbohydrate group    1 fruit or 1 starch 1 apple or 6 crackers 15      Total: 15    6 pm 4 carbohydrate group    2 starch 1 c potato 30    1 fruit 1/2 c fruit salad 15    1 vegetable 1 c salad -    1 milk 8 oz skim milk 12    6 protein group 6 oz fish -    1 fat group 2 tbsp low fat salad dressing -      Total: 57    9 pm 1 carbohydrate group    1 starch 6 crackers 15    1 protein 2 tbsp peanut butter -      Total: 15    Graphic 89431 Version 3.0  figure 2: The \"plate method\"     For the plate method, you start with a plate about 9 inches (23 cm) across. Then fill it with 1/2 non-starchy vegetables, 1/4 protein, and 1/4 carbs.  Graphic 825584 Version 2.0  Consumer Information Use and Disclaimer   Disclaimer: This generalized information is a limited " summary of diagnosis, treatment, and/or medication information. It is not meant to be comprehensive and should be used as a tool to help the user understand and/or assess potential diagnostic and treatment options. It does NOT include all information about conditions, treatments, medications, side effects, or risks that may apply to a specific patient. It is not intended to be medical advice or a substitute for the medical advice, diagnosis, or treatment of a health care provider based on the health care provider's examination and assessment of a patient's specific and unique circumstances. Patients must speak with a health care provider for complete information about their health, medical questions, and treatment options, including any risks or benefits regarding use of medications. This information does not endorse any treatments or medications as safe, effective, or approved for treating a specific patient. UpToDate, Inc. and its affiliates disclaim any warranty or liability relating to this information or the use thereof.The use of this information is governed by the Terms of Use, available at https://www.wolterskluwer.com/en/know/clinical-effectiveness-terms. 2024© UpToDate, Inc. and its affiliates and/or licensors. All rights reserved.  Copyright   © 2024 UpToDate, Inc. and/or its affiliates. All rights reserved.

## 2025-04-10 NOTE — PROGRESS NOTES
Name: Rory Tsai      : 1960      MRN: [id number]  Encounter Provider: YESENIA Elmore  Encounter Date: 4/10/2025   Encounter department: [facility] [address]  :  Assessment & Plan  Mixed hyperlipidemia  Recent lipid panel reviewed with patient.  Patient restarted atorvastatin as previously prescribed.  Lipid panel much improved from previous reading.  Encouraged heart healthy diet, physical activity as tolerated.  Will continue on atorvastatin 20 mg as prescribed.       Type 2 diabetes mellitus without complication, without long-term current use of insulin ([facility])    Lab Results   Component Value Date    HGBA1C 7.9 (H) 2025     A1c has increased from previous reading.  Patient admits to being sedentary, began drinking beer again.  Discussed with patient importance of monitoring carbohydrate and sugar intake.  To limit/decrease alcohol intake, to increase physical activity as tolerated.  Discussed initiation of GLP-1, such as Ozempic, to help promote decrease in CVD risk.  Per patient, does not feel he can self inject medications.  And does not want additional therapy at this time.  Patient states he will work on physical activity and making healthier food choices.  Advised patient to repeat blood work in 3 months.    Orders:    Hemoglobin A1C; Future    Comprehensive metabolic panel; Future    glimepiride (AMARYL) 4 mg tablet; Take 1 tablet (4 mg total) by mouth 2 (two) times a day with meals    Primary hypertension  Blood pressure controlled in office today.  Encouraged heart healthy diet, weight management, physical activity as tolerated.  To continue on lisinopril 40 mg and amlodipine 5 mg as prescribed.              History of Present Illness   Patient presents office for 3-month follow-up.  Patient was attempting to change diet to work on lipids, but last lipid panel was elevated.  Restarted atorvastatin.  Patient denies adverse effects from  "medication.  Patient denies symptoms related to elevated blood sugars.  Does admit to not sticking to diabetic diet.  Also states he has not been as active as he used to be due to recurrence of back pain.  Patient denies chest pain, shortness of breath, dizziness, headaches.      Review of Systems   Constitutional:  Negative for activity change, appetite change, fatigue and unexpected weight change.   HENT:  Negative for sore throat and trouble swallowing.    Eyes:  Negative for photophobia and visual disturbance.   Respiratory:  Negative for cough and shortness of breath.    Cardiovascular:  Negative for chest pain and palpitations.   Gastrointestinal:  Negative for abdominal pain, nausea and vomiting.   Endocrine: Negative for polydipsia, polyphagia and polyuria.   Genitourinary:  Negative for decreased urine volume.   Musculoskeletal:  Negative for arthralgias and myalgias.   Skin:  Negative for color change and rash.   Neurological:  Negative for dizziness, tremors, light-headedness, numbness and headaches.   Psychiatric/Behavioral:  Negative for confusion. The patient is not nervous/anxious.        Objective   /88 (BP Location: Left arm, Patient Position: Sitting, Cuff Size: Standard)   Pulse 90   Resp 18   Ht 5' 10\" (1.778 m)   Wt 92.1 kg (203 lb)   SpO2 98%   BMI 29.13 kg/m²      Physical Exam  Vitals reviewed.   Constitutional:       General: He is not in acute distress.     Appearance: Normal appearance. He is not ill-appearing.   HENT:      Head: Normocephalic and atraumatic.      Right Ear: External ear normal.      Left Ear: External ear normal.      Nose: Nose normal.      Mouth/Throat:      Mouth: Mucous membranes are moist.      Pharynx: Oropharynx is clear.   Eyes:      Conjunctiva/sclera: Conjunctivae normal.      Pupils: Pupils are equal, round, and reactive to light.   Cardiovascular:      Rate and Rhythm: Normal rate and regular rhythm.      Pulses: Normal pulses.      Heart sounds: " Normal heart sounds. No murmur heard.  Pulmonary:      Effort: Pulmonary effort is normal.      Breath sounds: Normal breath sounds.   Musculoskeletal:         General: Normal range of motion.      Cervical back: Normal range of motion and neck supple.   Skin:     General: Skin is warm and dry.   Neurological:      General: No focal deficit present.      Mental Status: He is alert and oriented to person, place, and time.   Psychiatric:         Mood and Affect: Mood normal.         Behavior: Behavior normal.

## 2025-06-17 DIAGNOSIS — I10 PRIMARY HYPERTENSION: ICD-10-CM

## 2025-06-17 RX ORDER — AMLODIPINE BESYLATE 5 MG/1
5 TABLET ORAL DAILY
Qty: 90 TABLET | Refills: 1 | Status: SHIPPED | OUTPATIENT
Start: 2025-06-17

## 2025-06-20 ENCOUNTER — OFFICE VISIT (OUTPATIENT)
Dept: PAIN MEDICINE | Facility: CLINIC | Age: 65
End: 2025-06-20

## 2025-06-20 VITALS — WEIGHT: 200 LBS | BODY MASS INDEX: 28.63 KG/M2 | HEIGHT: 70 IN

## 2025-06-20 DIAGNOSIS — M47.816 LUMBAR SPONDYLOSIS: ICD-10-CM

## 2025-06-20 DIAGNOSIS — G57.32 PERONEAL NEUROPATHY, LEFT: Primary | ICD-10-CM

## 2025-06-20 DIAGNOSIS — M25.552 LEFT HIP PAIN: ICD-10-CM

## 2025-06-20 DIAGNOSIS — M54.16 LUMBAR RADICULOPATHY: ICD-10-CM

## 2025-06-20 RX ORDER — GABAPENTIN 300 MG/1
300 CAPSULE ORAL 2 TIMES DAILY
Qty: 180 CAPSULE | Refills: 1 | Status: SHIPPED | OUTPATIENT
Start: 2025-06-20

## 2025-06-20 NOTE — ASSESSMENT & PLAN NOTE
Orders:  •  gabapentin (NEURONTIN) 300 mg capsule; Take 1 capsule (300 mg total) by mouth 2 (two) times a day

## 2025-06-20 NOTE — PROGRESS NOTES
Name: Rory Tsai      : 1960      MRN: [id number]  Encounter Provider: YESENIA Shook  Encounter Date: 2025   Encounter department: [facility] [place]  :  Assessment & Plan  Peroneal neuropathy, left  Lumbar spondylosis  Left hip pain  Lumbar radiculopathy    Orders:  •  gabapentin (NEURONTIN) 300 mg capsule; Take 1 capsule (300 mg total) by mouth 2 (two) times a day      Patient is prescribed gabapentin 300 mg 3 times daily however the patient continues to report an overall reduction of his pain level and improvement with his functioning without significant side effects using gabapentin 300 mg twice daily.  I will modify the prescription for twice daily dosing, refill provided.    Patient does state that whenever he is in a squatting position or a compressive position for too long doing his work as an  he has intense pain in his left hip.  Advised the patient that we could trial a left intra-articular hip injection should this pain become more persistent.  Patient states he will contact the office should he decide to move forward with a left intra-articular hip injection.    My impressions and treatment recommendations were discussed in detail with the patient who verbalized understanding and had no further questions.  Discharge instructions were provided. I personally saw and examined the patient and I agree with the above discussed plan of care.    History of Present Illness {?Quick Links Encounters * My Last Note * Last Note in Specialty * Snapshot * Since Last Visit * History :[id number]}    Rory Tsai is a 65 y.o. male who presents for a follow up office visit in regards to Back Pain (Lumbar Spine -- LV 24 with Dr. Zapien. No OA or UDS on file. Patient was prescribed Gabapentin by Dr. Zapien. XR 22; CT 22; Left L4 and L5 transforaminal epidural steroid injection under fluoroscopic guidance 22. He endorses radiating symptoms in to his L  "hip. He states the previous injection in 2022 did offer significant relief. He feels worse with bending over -- states more of an issue with balance and strength.). At today's visit patient states that their pain symptoms are better with a pain score of 1/10 on the verbal numeric pain scale.  The patient's pain is worse after activity with 'compression'.  The patient's pain is intermittent in nature in the hip and constant in the legs.  And the quality of the patient's pain is described as dull aching, numbness.  The patient's pain is located in the low back, left hip and bilateral feet (left greater then the right).  Patient reports significant relief of his pain using gabapentin 300 mg twice daily, patient denies any side effects using this medication.      Review of Systems   Respiratory:  Negative for shortness of breath.    Cardiovascular:  Negative for chest pain.   Gastrointestinal:  Negative for constipation, diarrhea, nausea and vomiting.   Musculoskeletal:  Positive for joint swelling. Negative for arthralgias, gait problem and myalgias.   Skin:  Negative for rash.   Neurological:  Negative for dizziness, seizures and weakness.   All other systems reviewed and are negative.      Medical History Reviewed by provider this encounter:  Tobacco  Allergies  Meds  Problems  Med Hx  Surg Hx  Fam Hx     .     Objective {?Quick Links Trend Vitals * Enter New Vitals * Results Review * Timeline (Adult) * Labs * Imaging * Cardiology * Procedures * Lung Cancer Screening * Surgical eConsent :[id number]}  Ht 5' 10\" (1.778 m)   Wt 90.7 kg (200 lb)   BMI 28.70 kg/m²      Pain Score:   1    Constitutional:normal, well developed, well nourished, alert, in no distress and non-toxic and no overt pain behavior.  Eyes:anicteric  HEENT:grossly intact  Neck:supple, symmetric, trachea midline and no masses   Pulmonary:even and unlabored  Cardiovascular:No edema or pitting edema present  Skin:Normal without rashes or lesions and " well hydrated  Psychiatric:Mood and affect appropriate  Neurologic:Cranial Nerves II-XII grossly intact  Musculoskeletal: normal gait    Lumbar Spine Exam    Special Tests:  Left Straight Leg Test:  negative  Left Norbert's Maneuver:  negative  Left Gaenslen's Test:  negative  Left Pelvic Distraction Test:  negative      This document was created using speech voice recognition software.   Grammatical errors, random word insertions, pronoun errors, and incomplete sentences are an occasional consequence of this system due to software limitations, ambient noise, and hardware issues.   Any formal questions or concerns about content, text, or information contained within the body of this dictation should be directly addressed to the provider for clarification.

## 2025-06-20 NOTE — PATIENT INSTRUCTIONS
Gabapentin (By mouth)   Gabapentin (lonnie-a-PEN-tin)  Treats seizures and pain caused by shingles.   Brand Name(s): FusePaq Fanatrex, Neurontin   There may be other brand names for this medicine.  When This Medicine Should Not Be Used:   This medicine is not right for everyone. Do not use it if you had an allergic reaction to gabapentin.  How to Use This Medicine:   Capsule, Liquid, Tablet  Take your medicine as directed. Your dose may need to be changed several times to find what works best for you. If you have epilepsy, do not allow more than 12 hours to pass between doses.  Capsule: Swallow the capsule whole with plenty of water. Do not open, crush, or chew it.  Gralise® tablet: Swallow the tablet whole . Do not crush, break, or chew it.  Neurontin® tablet: If you break a tablet into 2 pieces, use the second half as your next dose. Do not use the half-tablet if the whole tablet has been cut or broken after 28 days.  Oral liquid: Measure the oral liquid medicine with a marked measuring spoon, oral syringe, or medicine cup.  This medicine should come with a Medication Guide. Ask your pharmacist for a copy if you do not have one.  Missed dose: Take a dose as soon as you remember. If it is almost time for your next dose, wait until then and take a regular dose. Do not take extra medicine to make up for a missed dose.  Store the medicine in a closed container at room temperature, away from heat, moisture, and direct light. Store the Neurontin® oral liquid in the refrigerator. Do not freeze.  Drugs and Foods to Avoid:   Ask your doctor or pharmacist before using any other medicine, including over-the-counter medicines, vitamins, and herbal products.  Some medicines can affect how gabapentin works. Tell your doctor if you also using hydrocodone or morphine.  If you take an antacid, wait at least 2 hours before you take gabapentin.  Do not drink alcohol while you are using this medicine.  Tell your doctor if you use  anything else that makes you sleepy. Some examples are allergy medicine, narcotic pain medicine, and alcohol. Tell your doctor if you are also using lorazepam, oxycodone, or zolpidem.  Warnings While Using This Medicine:   Tell your doctor if you are pregnant or breastfeeding, or if you have kidney problems (including patients receiving dialysis) or lung problems. Tell your doctor if you have a history of depression or mental health problems.  This medicine may cause the following problems:  Drug reaction with eosinophilia and systemic symptoms (DRESS) or multiorgan hypersensitivity, which may damage the liver, kidney, blood, heart, or muscles  Changes in mood or behavior, including suicidal thoughts or behavior  Respiratory depression (serious breathing problem that can be life-threatening), when used with narcotic pain medicines  Do not stop using this medicine suddenly. Your doctor will need to slowly decrease your dose before you stop it completely.  This medicine may make you dizzy or drowsy. Do not drive or do anything else that could be dangerous until you know how this medicine affects you.  Tell any doctor or dentist who treats you that you are using this medicine. This medicine may affect certain medical test results.  Your doctor will check your progress and the effects of this medicine at regular visits. Keep all appointments.  Keep all medicine out of the reach of children. Never share your medicine with anyone.  Possible Side Effects While Using This Medicine:   Call your doctor right away if you notice any of these side effects:  Allergic reaction: Itching or hives, swelling in your face or hands, swelling or tingling in your mouth or throat, chest tightness, trouble breathing  Behavior problems, aggression, restlessness, trouble concentrating, moodiness (especially in children)  Blistering, peeling, red skin rash  Blue lips, fingernails, or skin, chest pain, fast heartbeat, trouble breathing  Change  in how much or how often you urinate, bloody or cloudy urine  Dark urine or pale stools, nausea, vomiting, loss of appetite, stomach pain, yellow skin or eyes  Fever, chills, cough, sore throat, body aches  Problems with coordination, shakiness, unsteadiness, unusual eye movement  Rapid weight gain, swelling in your hands, ankles, or feet  Rash, swollen or tender glands in the neck, armpit, or groin  Unusual moods or behaviors, thoughts of hurting yourself, feeling depressed  If you notice these less serious side effects, talk with your doctor:   Dizziness, drowsiness, sleepiness, tiredness  If you notice other side effects that you think are caused by this medicine, tell your doctor.   Call your doctor for medical advice about side effects. You may report side effects to FDA at 6-782-FDA-0843    © Copyright [employer] 2022 Information is for End User's use only and may not be sold, redistributed or otherwise used for commercial purposes.  The above information is an  only. It is not intended as medical advice for individual conditions or treatments. Talk to your doctor, nurse or pharmacist before following any medical regimen to see if it is safe and effective for you.

## 2025-07-03 ENCOUNTER — TELEPHONE (OUTPATIENT)
Age: 65
End: 2025-07-03

## 2025-07-03 NOTE — TELEPHONE ENCOUNTER
[facility] provider will be out of the office on  09/22/2025. Left voice mail message offering patient another appointment same week with different AP. Should patient call back please use view schedules for other available appointments.

## 2025-07-05 ENCOUNTER — APPOINTMENT (OUTPATIENT)
Dept: LAB | Facility: HOSPITAL | Age: 65
End: 2025-07-05
Attending: NURSE PRACTITIONER
Payer: COMMERCIAL

## 2025-07-05 DIAGNOSIS — E11.9 TYPE 2 DIABETES MELLITUS WITHOUT COMPLICATION, WITHOUT LONG-TERM CURRENT USE OF INSULIN (HCC): ICD-10-CM

## 2025-07-05 LAB
ALBUMIN SERPL BCG-MCNC: 4.3 G/DL (ref 3.5–5)
ALP SERPL-CCNC: 76 U/L (ref 34–104)
ALT SERPL W P-5'-P-CCNC: 34 U/L (ref 7–52)
ANION GAP SERPL CALCULATED.3IONS-SCNC: 3 MMOL/L (ref 4–13)
AST SERPL W P-5'-P-CCNC: 23 U/L (ref 13–39)
BILIRUB SERPL-MCNC: 0.57 MG/DL (ref 0.2–1)
BUN SERPL-MCNC: 18 MG/DL (ref 5–25)
CALCIUM SERPL-MCNC: 9.2 MG/DL (ref 8.4–10.2)
CHLORIDE SERPL-SCNC: 106 MMOL/L (ref 96–108)
CO2 SERPL-SCNC: 29 MMOL/L (ref 21–32)
CREAT SERPL-MCNC: 1.23 MG/DL (ref 0.6–1.3)
GFR SERPL CREATININE-BSD FRML MDRD: 61 ML/MIN/1.73SQ M
GLUCOSE P FAST SERPL-MCNC: 183 MG/DL (ref 65–99)
POTASSIUM SERPL-SCNC: 4.3 MMOL/L (ref 3.5–5.3)
PROT SERPL-MCNC: 6.7 G/DL (ref 6.4–8.4)
SODIUM SERPL-SCNC: 138 MMOL/L (ref 135–147)

## 2025-07-05 PROCEDURE — 80053 COMPREHEN METABOLIC PANEL: CPT

## 2025-07-05 PROCEDURE — 36415 COLL VENOUS BLD VENIPUNCTURE: CPT

## 2025-07-05 PROCEDURE — 83036 HEMOGLOBIN GLYCOSYLATED A1C: CPT

## 2025-07-06 LAB
EST. AVERAGE GLUCOSE BLD GHB EST-MCNC: 143 MG/DL
HBA1C MFR BLD: 6.6 %

## 2025-07-10 ENCOUNTER — OFFICE VISIT (OUTPATIENT)
Dept: FAMILY MEDICINE CLINIC | Facility: CLINIC | Age: 65
End: 2025-07-10
Payer: COMMERCIAL

## 2025-07-10 VITALS
OXYGEN SATURATION: 98 % | HEIGHT: 70 IN | DIASTOLIC BLOOD PRESSURE: 80 MMHG | RESPIRATION RATE: 18 BRPM | SYSTOLIC BLOOD PRESSURE: 130 MMHG | HEART RATE: 88 BPM | WEIGHT: 202.2 LBS | BODY MASS INDEX: 28.95 KG/M2

## 2025-07-10 DIAGNOSIS — Z00.00 ENCOUNTER FOR INITIAL ANNUAL WELLNESS VISIT (AWV) IN MEDICARE PATIENT: Primary | ICD-10-CM

## 2025-07-10 DIAGNOSIS — I10 PRIMARY HYPERTENSION: ICD-10-CM

## 2025-07-10 DIAGNOSIS — E78.2 MIXED HYPERLIPIDEMIA: ICD-10-CM

## 2025-07-10 DIAGNOSIS — E11.9 TYPE 2 DIABETES MELLITUS WITHOUT COMPLICATION, WITHOUT LONG-TERM CURRENT USE OF INSULIN (HCC): ICD-10-CM

## 2025-07-10 DIAGNOSIS — K21.9 GASTROESOPHAGEAL REFLUX DISEASE WITHOUT ESOPHAGITIS: ICD-10-CM

## 2025-07-10 PROCEDURE — G0402 INITIAL PREVENTIVE EXAM: HCPCS | Performed by: NURSE PRACTITIONER

## 2025-07-10 PROCEDURE — G0403 EKG FOR INITIAL PREVENT EXAM: HCPCS | Performed by: NURSE PRACTITIONER

## 2025-07-10 RX ORDER — FAMOTIDINE 20 MG/1
20 TABLET, FILM COATED ORAL DAILY
Qty: 90 TABLET | Refills: 1 | Status: SHIPPED | OUTPATIENT
Start: 2025-07-10

## 2025-07-10 RX ORDER — ATORVASTATIN CALCIUM 20 MG/1
20 TABLET, FILM COATED ORAL EVERY EVENING
Qty: 90 TABLET | Refills: 1 | Status: SHIPPED | OUTPATIENT
Start: 2025-07-10 | End: 2026-01-06

## 2025-07-10 NOTE — ASSESSMENT & PLAN NOTE
Currently on atorvastatin 20 mg.  Has been engaging in diet modifications.  To obtain blood work as ordered.    Orders:    atorvastatin (LIPITOR) 20 mg tablet; Take 1 tablet (20 mg total) by mouth every evening    Comprehensive metabolic panel; Future    Lipid Panel with Direct LDL reflex; Future

## 2025-07-10 NOTE — ASSESSMENT & PLAN NOTE
Lab Results   Component Value Date    HGBA1C 6.6 (H) 07/05/2025     A1c has improved with diet modifications.  Encourage patient to continue with physical activity and diet modifications.  To continue on glimepiride as prescribed.  Will reevaluate blood work in 3 months.    Orders:    atorvastatin (LIPITOR) 20 mg tablet; Take 1 tablet (20 mg total) by mouth every evening    Hemoglobin A1C; Future    Comprehensive metabolic panel; Future

## 2025-07-10 NOTE — PATIENT INSTRUCTIONS
Medicare Preventive Visit Patient Instructions  Thank you for completing your Welcome to Medicare Visit or Medicare Annual Wellness Visit today. Your next wellness visit will be due in one year (7/11/2026).  The screening/preventive services that you may require over the next 5-10 years are detailed below. Some tests may not apply to you based off risk factors and/or age. Screening tests ordered at today's visit but not completed yet may show as past due. Also, please note that scanned in results may not display below.  Preventive Screenings:  Service Recommendations Previous Testing/Comments   Colorectal Cancer Screening  Colonoscopy    Fecal Occult Blood Test (FOBT)/Fecal Immunochemical Test (FIT)  Fecal DNA/Cologuard Test  Flexible Sigmoidoscopy Age: 45-75 years old   Colonoscopy: every 10 years (May be performed more frequently if at higher risk)  OR  FOBT/FIT: every 1 year  OR  Cologuard: every 3 years  OR  Sigmoidoscopy: every 5 years  Screening may be recommended earlier than age 45 if at higher risk for colorectal cancer. Also, an individualized decision between you and your healthcare provider will decide whether screening between the ages of 76-85 would be appropriate. Colonoscopy: 04/29/2023  FOBT/FIT: Not on file  Cologuard: Not on file  Sigmoidoscopy: Not on file    Screening Current     Prostate Cancer Screening Individualized decision between patient and health care provider in men between ages of 55-69   Medicare will cover every 12 months beginning on the day after your 50th birthday PSA: 2.970 ng/mL     Screening Current     Hepatitis C Screening Once for adults born between 1945 and 1965  More frequently in patients at high risk for Hepatitis C Hep C Antibody: Not on file        Diabetes Screening 1-2 times per year if you're at risk for diabetes or have pre-diabetes Fasting glucose: 183 mg/dL (7/5/2025)  A1C: 6.6 % (7/5/2025)  Screening Not Indicated  History Diabetes   Cholesterol Screening Once  every 5 years if you don't have a lipid disorder. May order more often based on risk factors. Lipid panel: 04/07/2025  Screening Not Indicated  History Lipid Disorder      Other Preventive Screenings Covered by Medicare:  Abdominal Aortic Aneurysm (AAA) Screening: covered once if your at risk. You're considered to be at risk if you have a family history of AAA or a male between the age of 65-75 who smoking at least 100 cigarettes in your lifetime.  Lung Cancer Screening: covers low dose CT scan once per year if you meet all of the following conditions: (1) Age 55-77; (2) No signs or symptoms of lung cancer; (3) Current smoker or have quit smoking within the last 15 years; (4) You have a tobacco smoking history of at least 20 pack years (packs per day x number of years you smoked); (5) You get a written order from a healthcare provider.  Glaucoma Screening: covered annually if you're considered high risk: (1) You have diabetes OR (2) Family history of glaucoma OR (3)  aged 50 and older OR (4)  American aged 65 and older  Osteoporosis Screening: covered every 2 years if you meet one of the following conditions: (1) Have a vertebral abnormality; (2) On glucocorticoid therapy for more than 3 months; (3) Have primary hyperparathyroidism; (4) On osteoporosis medications and need to assess response to drug therapy.  HIV Screening: covered annually if you're between the age of 15-65. Also covered annually if you are younger than 15 and older than 65 with risk factors for HIV infection. For pregnant patients, it is covered up to 3 times per pregnancy.    Immunizations:  Immunization Recommendations   Influenza Vaccine Annual influenza vaccination during flu season is recommended for all persons aged >= 6 months who do not have contraindications   Pneumococcal Vaccine   * Pneumococcal conjugate vaccine = PCV13 (Prevnar 13), PCV15 (Vaxneuvance), PCV20 (Prevnar 20)  * Pneumococcal polysaccharide vaccine  = PPSV23 (Pneumovax) Adults 19-65 yo with certain risk factors or if 65+ yo  If never received any pneumonia vaccine: recommend Prevnar 20 (PCV20)  Give PCV20 if previously received 1 dose of PCV13 or PPSV23   Hepatitis B Vaccine 3 dose series if at intermediate or high risk (ex: diabetes, end stage renal disease, liver disease)   Respiratory syncytial virus (RSV) Vaccine - COVERED BY MEDICARE PART D  * RSVPreF3 (Arexvy) CDC recommends that adults 60 years of age and older may receive a single dose of RSV vaccine using shared clinical decision-making (SCDM)   Tetanus (Td) Vaccine - COST NOT COVERED BY MEDICARE PART B Following completion of primary series, a booster dose should be given every 10 years to maintain immunity against tetanus. Td may also be given as tetanus wound prophylaxis.   Tdap Vaccine - COST NOT COVERED BY MEDICARE PART B Recommended at least once for all adults. For pregnant patients, recommended with each pregnancy.   Shingles Vaccine (Shingrix) - COST NOT COVERED BY MEDICARE PART B  2 shot series recommended in those 19 years and older who have or will have weakened immune systems or those 50 years and older     Health Maintenance Due:      Topic Date Due   • Hepatitis C Screening  Never done   • HIV Screening  Never done   • Colorectal Cancer Screening  04/26/2033     Immunizations Due:      Topic Date Due   • Pneumococcal Vaccine: 50+ Years (1 of 2 - PCV) Never done   • COVID-19 Vaccine (1 - 2024-25 season) Never done   • Influenza Vaccine (1) 09/01/2025     Advance Directives   What are advance directives?  Advance directives are legal documents that state your wishes and plans for medical care. These plans are made ahead of time in case you lose your ability to make decisions for yourself. Advance directives can apply to any medical decision, such as the treatments you want, and if you want to donate organs.   What are the types of advance directives?  There are many types of advance  directives, and each state has rules about how to use them. You may choose a combination of any of the following:  Living will:  This is a written record of the treatment you want. You can also choose which treatments you do not want, which to limit, and which to stop at a certain time. This includes surgery, medicine, IV fluid, and tube feedings.   Durable power of  for healthcare (DPAHC):  This is a written record that states who you want to make healthcare choices for you when you are unable to make them for yourself. This person, called a proxy, is usually a family member or a friend. You may choose more than 1 proxy.  Do not resuscitate (DNR) order:  A DNR order is used in case your heart stops beating or you stop breathing. It is a request not to have certain forms of treatment, such as CPR. A DNR order may be included in other types of advance directives.  Medical directive:  This covers the care that you want if you are in a coma, near death, or unable to make decisions for yourself. You can list the treatments you want for each condition. Treatment may include pain medicine, surgery, blood transfusions, dialysis, IV or tube feedings, and a ventilator (breathing machine).  Values history:  This document has questions about your views, beliefs, and how you feel and think about life. This information can help others choose the care that you would choose.  Why are advance directives important?  An advance directive helps you control your care. Although spoken wishes may be used, it is better to have your wishes written down. Spoken wishes can be misunderstood, or not followed. Treatments may be given even if you do not want them. An advance directive may make it easier for your family to make difficult choices about your care.   Cigarette Smoking and Your Health   Risks to your health if you smoke:  Nicotine and other chemicals found in tobacco damage every cell in your body. Even if you are a light  smoker, you have an increased risk for cancer, heart disease, and lung disease. If you are pregnant or have diabetes, smoking increases your risk for complications.   Benefits to your health if you stop smoking:   You decrease respiratory symptoms such as coughing, wheezing, and shortness of breath.   You reduce your risk for cancers of the lung, mouth, throat, kidney, bladder, pancreas, stomach, and cervix. If you already have cancer, you increase the benefits of chemotherapy. You also reduce your risk for cancer returning or a second cancer from developing.   You reduce your risk for heart disease, blood clots, heart attack, and stroke.   You reduce your risk for lung infections, and diseases such as pneumonia, asthma, chronic bronchitis, and emphysema.  Your circulation improves. More oxygen can be delivered to your body. If you have diabetes, you lower your risk for complications, such as kidney, artery, and eye diseases. You also lower your risk for nerve damage. Nerve damage can lead to amputations, poor vision, and blindness.  You improve your body's ability to heal and to fight infections.  For more information and support to stop smoking:   [employer]  Phone: 5- 302 - 504-0065  Web Address: www.[employer]  Weight Management   Why it is important to manage your weight:  Being overweight increases your risk of health conditions such as heart disease, high blood pressure, type 2 diabetes, and certain types of cancer. It can also increase your risk for osteoarthritis, sleep apnea, and other respiratory problems. Aim for a slow, steady weight loss. Even a small amount of weight loss can lower your risk of health problems.  How to lose weight safely:  A safe and healthy way to lose weight is to eat fewer calories and get regular exercise. You can lose up about 1 pound a week by decreasing the number of calories you eat by 500 calories each day.   Healthy meal plan for weight management:  A healthy meal plan  includes a variety of foods, contains fewer calories, and helps you stay healthy. A healthy meal plan includes the following:  Eat whole-grain foods more often.  A healthy meal plan should contain fiber. Fiber is the part of grains, fruits, and vegetables that is not broken down by your body. Whole-grain foods are healthy and provide extra fiber in your diet. Some examples of whole-grain foods are whole-wheat breads and pastas, oatmeal, brown rice, and bulgur.  Eat a variety of vegetables every day.  Include dark, leafy greens such as spinach, kale, bryon greens, and mustard greens. Eat yellow and orange vegetables such as carrots, sweet potatoes, and winter squash.   Eat a variety of fruits every day.  Choose fresh or canned fruit (canned in its own juice or light syrup) instead of juice. Fruit juice has very little or no fiber.  Eat low-fat dairy foods.  Drink fat-free (skim) milk or 1% milk. Eat fat-free yogurt and low-fat cottage cheese. Try low-fat cheeses such as mozzarella and other reduced-fat cheeses.  Choose meat and other protein foods that are low in fat.  Choose beans or other legumes such as split peas or lentils. Choose fish, skinless poultry (chicken or turkey), or lean cuts of red meat (beef or pork). Before you cook meat or poultry, cut off any visible fat.   Use less fat and oil.  Try baking foods instead of frying them. Add less fat, such as margarine, sour cream, regular salad dressing and mayonnaise to foods. Eat fewer high-fat foods. Some examples of high-fat foods include french fries, doughnuts, ice cream, and cakes.  Eat fewer sweets.  Limit foods and drinks that are high in sugar. This includes candy, cookies, regular soda, and sweetened drinks.  Exercise:  Exercise at least 30 minutes per day on most days of the week. Some examples of exercise include walking, biking, dancing, and swimming. You can also fit in more physical activity by taking the stairs instead of the elevator or  parking farther away from stores. Ask your healthcare provider about the best exercise plan for you.    © Copyright [employer] 2018 Information is for End User's use only and may not be sold, redistributed or otherwise used for commercial purposes. All illustrations and images included in CareNotes® are the copyrighted property of [employer] or [employer]

## 2025-07-10 NOTE — PROGRESS NOTES
Name: Rory Tsai      : 1960      MRN: [id number]  Encounter Provider: YESENIA Elmore  Encounter Date: 7/10/2025   Encounter department: [facility] [address] [facility]  :  Assessment & Plan  Encounter for initial annual wellness visit (AWV) in Medicare patient    Orders:    POCT ECG    Type 2 diabetes mellitus without complication, without long-term current use of insulin (HCC)    Lab Results   Component Value Date    HGBA1C 6.6 (H) 2025     A1c has improved with diet modifications.  Encourage patient to continue with physical activity and diet modifications.  To continue on glimepiride as prescribed.  Will reevaluate blood work in 3 months.    Orders:    atorvastatin (LIPITOR) 20 mg tablet; Take 1 tablet (20 mg total) by mouth every evening    Hemoglobin A1C; Future    Comprehensive metabolic panel; Future    Mixed hyperlipidemia  Currently on atorvastatin 20 mg.  Has been engaging in diet modifications.  To obtain blood work as ordered.    Orders:    atorvastatin (LIPITOR) 20 mg tablet; Take 1 tablet (20 mg total) by mouth every evening    Comprehensive metabolic panel; Future    Lipid Panel with Direct LDL reflex; Future    Primary hypertension  Blood pressure controlled in office today.  Encouraged heart healthy diet, weight management, monitoring/limiting sodium intake.  To continue on amlodipine and lisinopril as prescribed.    Orders:    POCT ECG    Gastroesophageal reflux disease without esophagitis    Orders:    famotidine (PEPCID) 20 mg tablet; Take 1 tablet (20 mg total) by mouth daily      Depression Screening and Follow-up Plan: Patient was screened for depression during today's encounter. They screened negative with a PHQ-2 score of 0.        Preventive health issues were discussed with patient, and age appropriate screening tests were ordered as noted in patient's After Visit Summary. Personalized health advice and appropriate referrals for health  education or preventive services given if needed, as noted in patient's After Visit Summary.    History of Present Illness     Patient presents to the office for annual Medicare wellness visit.  Patient notes compliance with daily medications for hypertension and diabetes.  Has been engaging in healthier food choices and being mindful of portions to help with diabetes.  Patient denies symptoms related to hypo or hyperglycemia.  Denies chest pain, shortness of breath, weakness, dizziness, syncope with exertion.       Patient Care Team:  YESENIA Elmore as PCP - General (Family Medicine)  YESENIA Elmore as PCP - PCP-[employer] ([facility])  Ignacia Lauren as PCP - PCP-[facility] ([facility])    Review of Systems   Constitutional:  Negative for activity change, appetite change, fatigue and unexpected weight change.   HENT:  Negative for congestion, ear pain and sore throat.    Eyes:  Negative for photophobia and visual disturbance.   Respiratory:  Negative for cough, chest tightness and shortness of breath.    Cardiovascular:  Negative for chest pain and palpitations.   Gastrointestinal:  Negative for abdominal pain, blood in stool, constipation, diarrhea, nausea and vomiting.   Endocrine: Negative for polydipsia, polyphagia and polyuria.   Genitourinary:  Negative for decreased urine volume, dysuria, flank pain, frequency, hematuria, testicular pain and urgency.   Musculoskeletal:  Negative for arthralgias, back pain and myalgias.   Skin:  Negative for color change and rash.   Neurological:  Negative for dizziness, syncope, weakness, light-headedness, numbness and headaches.   Hematological:  Negative for adenopathy. Does not bruise/bleed easily.   Psychiatric/Behavioral:  Negative for dysphoric mood and sleep disturbance. The patient is not nervous/anxious.      Medical History Reviewed by provider this encounter:  Tobacco  Allergies  Meds  Problems  Med Hx  Surg Hx  Fam Hx  Soc   Hx      Annual Wellness  Visit Questionnaire   Rory is here for his Welcome to Medicare visit.     Health Risk Assessment:   Patient rates overall health as very good. Patient feels that their physical health rating is much better. Patient is very satisfied with their life. Eyesight was rated as same. Hearing was rated as same. Patient feels that their emotional and mental health rating is much better. Patients states they are never, rarely angry. Patient states they are never, rarely unusually tired/fatigued. Pain experienced in the last 7 days has been none. Patient states that he has experienced no weight loss or gain in last 6 months.     Depression Screening:   PHQ-2 Score: 0      Fall Risk Screening:   In the past year, patient has experienced: no history of falling in past year      Home Safety:  Patient does not have trouble with stairs inside or outside of their home. Patient has working smoke alarms and has working carbon monoxide detector. Home safety hazards include: none.     Nutrition:   Current diet is Regular.     Medications:   Patient is not currently taking any over-the-counter supplements. Patient is able to manage medications.     Activities of Daily Living (ADLs)/Instrumental Activities of Daily Living (IADLs):   Walk and transfer into and out of bed and chair?: Yes  Dress and groom yourself?: Yes    Bathe or shower yourself?: Yes    Feed yourself? Yes  Do your laundry/housekeeping?: Yes  Manage your money, pay your bills and track your expenses?: Yes  Make your own meals?: Yes    Do your own shopping?: Yes    ADL comments: Lives in 2-story home with wife    Previous Hospitalizations:   Any hospitalizations or ED visits within the last 12 months?: No      Advance Care Planning:   Living will: No    Advanced directive: No      Preventive Screenings      Cardiovascular Screening:    General: Screening Not Indicated, History Lipid Disorder and Screening Current      Diabetes Screening:     General: Screening Not  Indicated, History Diabetes and Screening Current      Colorectal Cancer Screening:     General: Screening Current      Prostate Cancer Screening:    General: Screening Current      Osteoporosis Screening:    General: Screening Not Indicated      Abdominal Aortic Aneurysm (AAA) Screening:    Risk factors include: age between 65-76 yo and tobacco use        Lung Cancer Screening:     General: Screening Not Indicated    Immunizations:  - Immunizations due: Prevnar 20 and Zoster (Shingrix)    Screening, Brief Intervention, and Referral to Treatment (SBIRT)     Screening      Single Item Drug Screening:  How often have you used an illegal drug (including marijuana) or a prescription medication for non-medical reasons in the past year? never    Single Item Drug Screen Score: 0  Interpretation: Negative screen for possible drug use disorder    Other Counseling Topics:   Car/seat belt/driving safety, skin self-exam, sunscreen and regular weightbearing exercise and calcium and vitamin D intake.     Social Drivers of Health     Food Insecurity: No Food Insecurity (7/10/2025)    Nursing - Inadequate Food Risk Classification     Worried About Running Out of Food in the Last Year: Never true     Ran Out of Food in the Last Year: Never true   Transportation Needs: No Transportation Needs (7/10/2025)    PRAPARE - Transportation     Lack of Transportation (Medical): No     Lack of Transportation (Non-Medical): No   Housing Stability: Low Risk  (7/10/2025)    Housing Stability Vital Sign     Unable to Pay for Housing in the Last Year: No     Number of Times Moved in the Last Year: 0     Homeless in the Last Year: No   Utilities: Not At Risk (7/10/2025)    [facility] Utilities     Threatened with loss of utilities: No     Vision Screening    Right eye Left eye Both eyes   Without correction 20/30 20/30 20/30   With correction          Objective   /80 (BP Location: Left arm, Patient Position: Sitting) Comment: verbalized reading to  "patient  Pulse 88   Resp 18   Ht 5' 10\" (1.778 m)   Wt 91.7 kg (202 lb 3.2 oz)   SpO2 98%   BMI 29.01 kg/m²     Physical Exam  Vitals reviewed.   Constitutional:       General: He is not in acute distress.     Appearance: Normal appearance. He is well-developed. He is not ill-appearing.   HENT:      Head: Normocephalic and atraumatic.      Right Ear: Tympanic membrane, ear canal and external ear normal.      Left Ear: Tympanic membrane, ear canal and external ear normal.      Nose: Nose normal.      Mouth/Throat:      Mouth: Mucous membranes are moist.      Pharynx: Oropharynx is clear.     Eyes:      Extraocular Movements: Extraocular movements intact.      Conjunctiva/sclera: Conjunctivae normal.      Pupils: Pupils are equal, round, and reactive to light.     Neck:      Vascular: No carotid bruit.     Cardiovascular:      Rate and Rhythm: Normal rate and regular rhythm.      Pulses: Normal pulses.      Heart sounds: Normal heart sounds. No murmur heard.     Comments: POCT ECG:  Normal sinus rhythm  Rate 86  Pulmonary:      Effort: Pulmonary effort is normal. No respiratory distress.      Breath sounds: Normal breath sounds.   Abdominal:      General: Bowel sounds are normal.      Palpations: Abdomen is soft.      Tenderness: There is no abdominal tenderness.     Musculoskeletal:         General: Normal range of motion.      Cervical back: Normal range of motion and neck supple.      Right lower leg: No edema.      Left lower leg: No edema.   Lymphadenopathy:      Cervical: No cervical adenopathy.     Skin:     General: Skin is warm and dry.      Capillary Refill: Capillary refill takes less than 2 seconds.     Neurological:      General: No focal deficit present.      Mental Status: He is alert and oriented to person, place, and time.     Psychiatric:         Mood and Affect: Mood normal.         Behavior: Behavior normal.         "

## 2025-07-10 NOTE — ASSESSMENT & PLAN NOTE
Blood pressure controlled in office today.  Encouraged heart healthy diet, weight management, monitoring/limiting sodium intake.  To continue on amlodipine and lisinopril as prescribed.    Orders:    POCT ECG

## 2025-08-06 ENCOUNTER — TELEPHONE (OUTPATIENT)
Age: 65
End: 2025-08-06